# Patient Record
Sex: MALE | Race: WHITE | NOT HISPANIC OR LATINO | Employment: OTHER | URBAN - METROPOLITAN AREA
[De-identification: names, ages, dates, MRNs, and addresses within clinical notes are randomized per-mention and may not be internally consistent; named-entity substitution may affect disease eponyms.]

---

## 2017-02-21 ENCOUNTER — OFFICE VISIT (OUTPATIENT)
Dept: ORTHOPEDIC SURGERY | Facility: CLINIC | Age: 57
End: 2017-02-21

## 2017-02-21 VITALS — TEMPERATURE: 98.7 F | WEIGHT: 251 LBS | HEIGHT: 70 IN | BODY MASS INDEX: 35.93 KG/M2

## 2017-02-21 DIAGNOSIS — M48.061 SPINAL STENOSIS OF LUMBAR REGION: Primary | ICD-10-CM

## 2017-02-21 PROCEDURE — 99204 OFFICE O/P NEW MOD 45 MIN: CPT | Performed by: ORTHOPAEDIC SURGERY

## 2017-02-21 RX ORDER — NAPROXEN SODIUM 220 MG
220 TABLET ORAL 2 TIMES DAILY PRN
COMMUNITY
End: 2021-04-06 | Stop reason: HOSPADM

## 2017-02-21 RX ORDER — ACETAMINOPHEN 325 MG/1
650 TABLET ORAL AS NEEDED
COMMUNITY
End: 2017-09-15

## 2017-02-21 RX ORDER — HYDROCODONE BITARTRATE AND ACETAMINOPHEN 5; 325 MG/1; MG/1
1 TABLET ORAL EVERY 8 HOURS PRN
Qty: 40 TABLET | Refills: 0 | Status: SHIPPED | OUTPATIENT
Start: 2017-02-21 | End: 2017-03-21 | Stop reason: SDUPTHER

## 2017-02-21 NOTE — PROGRESS NOTES
New patient or new problem visit    Chief Complaint   Patient presents with   • Lumbar Spine - Pain       HPI: He complains of low back pain which radiates down the left lower extremity buttock thigh to the foot ongoing for 2-3 years.  Severe aching burning and constant some back pain mostly leg pain.    PFSH: See chart- reviewed    Review of Systems   Constitutional: Negative for chills, fever and unexpected weight change.   HENT: Positive for tinnitus. Negative for trouble swallowing and voice change.    Eyes: Negative for visual disturbance.   Respiratory: Negative for cough and shortness of breath.    Cardiovascular: Negative for chest pain and leg swelling.   Gastrointestinal: Negative for abdominal pain, nausea and vomiting.   Endocrine: Negative for cold intolerance and heat intolerance.   Genitourinary: Negative for difficulty urinating, frequency and urgency.   Musculoskeletal: Positive for joint swelling.   Skin: Negative for rash and wound.   Allergic/Immunologic: Negative for immunocompromised state.   Neurological: Positive for numbness (left leg). Negative for weakness.   Hematological: Does not bruise/bleed easily.   Psychiatric/Behavioral: Negative for dysphoric mood. The patient is not nervous/anxious.        PE: Constitutional: Vital signs above-noted.  Awake, alert and oriented    Psychiatric: Affect and insight do not appear grossly disturbed.    Pulmonary: Breathing is unlabored, color is good.    Skin: Warm, dry and normal turgor    Cardiac:  pedal pulses intact.  No edema.    Eyesight and hearing appear adequate for examination purposes      Musculoskeletal:  There is no tenderness to percussion and palpation of the spine. Motion appears undisturbed.  Posture is unremarkable to coronal and sagittal inspection.    The skin about the area is intact.  There is no palpable or visible deformity.  There is no local spasm.       Neurologic:   Reflexes are 2+ and symmetrical in the patellae and absent  in the Achilles.   Motor function is undisturbed in quadriceps, EHL, and gastrocnemius      Sensation appears symmetrically intact to light touch   .  In the bilateral lower extremities there is no evidence of atrophy.   Clonus is absent..  Gait appears undisturbed.  SLR test negative      MEDICAL DECISION MAKING    XRAY: MRI of the lumbar spine shows multilevel spondylosis and multilevel foraminal stenosis with mild canal compression changes.    Other: n/a    Impression: Lumbar disc degeneration lumbar spinal stenosis    Plan: Number epidural steroid injection.  I'll give him a few hydrocodone just for temporarily until the shots, for acute pain.

## 2017-03-15 ENCOUNTER — ANESTHESIA EVENT (OUTPATIENT)
Dept: PAIN MEDICINE | Facility: HOSPITAL | Age: 57
End: 2017-03-15

## 2017-03-15 ENCOUNTER — ANESTHESIA (OUTPATIENT)
Dept: PAIN MEDICINE | Facility: HOSPITAL | Age: 57
End: 2017-03-15

## 2017-03-15 ENCOUNTER — HOSPITAL ENCOUNTER (OUTPATIENT)
Dept: PAIN MEDICINE | Facility: HOSPITAL | Age: 57
Discharge: HOME | End: 2017-03-15
Attending: ORTHOPAEDIC SURGERY | Admitting: ORTHOPAEDIC SURGERY

## 2017-03-15 ENCOUNTER — TRANSCRIBE ORDERS (OUTPATIENT)
Dept: PAIN MEDICINE | Facility: HOSPITAL | Age: 57
End: 2017-03-15

## 2017-03-15 ENCOUNTER — HOSPITAL ENCOUNTER (OUTPATIENT)
Dept: GENERAL RADIOLOGY | Facility: HOSPITAL | Age: 57
Discharge: HOME | End: 2017-03-15

## 2017-03-15 VITALS
OXYGEN SATURATION: 94 % | WEIGHT: 250 LBS | HEART RATE: 73 BPM | HEIGHT: 72 IN | SYSTOLIC BLOOD PRESSURE: 126 MMHG | RESPIRATION RATE: 16 BRPM | BODY MASS INDEX: 33.86 KG/M2 | TEMPERATURE: 97.8 F | DIASTOLIC BLOOD PRESSURE: 83 MMHG

## 2017-03-15 DIAGNOSIS — M48.061 SPINAL STENOSIS OF LUMBAR REGION: Primary | ICD-10-CM

## 2017-03-15 DIAGNOSIS — M48.061 SPINAL STENOSIS OF LUMBAR REGION: ICD-10-CM

## 2017-03-15 DIAGNOSIS — R52 PAIN: ICD-10-CM

## 2017-03-15 PROCEDURE — 25010000002 METHYLPREDNISOLONE PER 80 MG: Performed by: ANESTHESIOLOGY

## 2017-03-15 PROCEDURE — 0 IOPAMIDOL 41 % SOLUTION: Performed by: ANESTHESIOLOGY

## 2017-03-15 PROCEDURE — 77003 FLUOROGUIDE FOR SPINE INJECT: CPT

## 2017-03-15 PROCEDURE — 25010000002 MIDAZOLAM PER 1 MG: Performed by: ANESTHESIOLOGY

## 2017-03-15 PROCEDURE — 25010000002 FENTANYL CITRATE (PF) 100 MCG/2ML SOLUTION: Performed by: ANESTHESIOLOGY

## 2017-03-15 PROCEDURE — C1755 CATHETER, INTRASPINAL: HCPCS

## 2017-03-15 RX ORDER — SODIUM CHLORIDE 0.9 % (FLUSH) 0.9 %
1-10 SYRINGE (ML) INJECTION AS NEEDED
Status: CANCELLED | OUTPATIENT
Start: 2017-03-15

## 2017-03-15 RX ORDER — BUPIVACAINE HYDROCHLORIDE 2.5 MG/ML
30 INJECTION, SOLUTION EPIDURAL; INFILTRATION; INTRACAUDAL ONCE
Status: DISCONTINUED | OUTPATIENT
Start: 2017-03-15 | End: 2017-03-16 | Stop reason: HOSPADM

## 2017-03-15 RX ORDER — LIDOCAINE HYDROCHLORIDE 10 MG/ML
1 INJECTION, SOLUTION INFILTRATION; PERINEURAL ONCE
Status: DISCONTINUED | OUTPATIENT
Start: 2017-03-15 | End: 2017-03-16 | Stop reason: HOSPADM

## 2017-03-15 RX ORDER — METHYLPREDNISOLONE ACETATE 80 MG/ML
80 INJECTION, SUSPENSION INTRA-ARTICULAR; INTRALESIONAL; INTRAMUSCULAR; SOFT TISSUE ONCE
Status: COMPLETED | OUTPATIENT
Start: 2017-03-15 | End: 2017-03-15

## 2017-03-15 RX ORDER — FENTANYL CITRATE 50 UG/ML
50 INJECTION, SOLUTION INTRAMUSCULAR; INTRAVENOUS
Status: DISCONTINUED | OUTPATIENT
Start: 2017-03-15 | End: 2017-03-16 | Stop reason: HOSPADM

## 2017-03-15 RX ORDER — SODIUM CHLORIDE 0.9 % (FLUSH) 0.9 %
1-10 SYRINGE (ML) INJECTION AS NEEDED
Status: DISCONTINUED | OUTPATIENT
Start: 2017-03-15 | End: 2017-03-16 | Stop reason: HOSPADM

## 2017-03-15 RX ORDER — MIDAZOLAM HYDROCHLORIDE 1 MG/ML
1 INJECTION INTRAMUSCULAR; INTRAVENOUS
Status: DISCONTINUED | OUTPATIENT
Start: 2017-03-15 | End: 2017-03-16 | Stop reason: HOSPADM

## 2017-03-15 RX ADMIN — IOPAMIDOL 10 ML: 408 INJECTION, SOLUTION INTRATHECAL at 10:07

## 2017-03-15 RX ADMIN — FENTANYL CITRATE 50 MCG: 50 INJECTION INTRAMUSCULAR; INTRAVENOUS at 10:02

## 2017-03-15 RX ADMIN — METHYLPREDNISOLONE ACETATE 80 MG: 80 INJECTION, SUSPENSION INTRA-ARTICULAR; INTRALESIONAL; INTRAMUSCULAR; SOFT TISSUE at 10:05

## 2017-03-15 RX ADMIN — MIDAZOLAM 2 MG: 1 INJECTION INTRAMUSCULAR; INTRAVENOUS at 10:02

## 2017-03-15 NOTE — H&P
[facility]    History and Physical    Patient Name: Harvinder Vega  :  1960  MRN:  [id number]  Date of Admission: 3/15/2017    Subjective     Patient is a 57 y.o. male presents with chief complaint of chronic, intermitent, excruciating low back and hips: left pain.  Onset of symptoms was gradual starting 2 years ago.  Symptoms are associated/aggravated by exercise, lifting, running, standing, straining or twisting. Symptoms improve with relaxation    The following portions of the patients history were reviewed and updated as appropriate: current medications, allergies, past medical history, past surgical history, past family history, past social history and problem list                Objective     Past Medical History: History reviewed. No pertinent past medical history.  Past Surgical History:   Past Surgical History   Procedure Laterality Date   • Rotator cuff repair Right    • Shoulder surgery Right    • Knee surgery Right      Family History:   Family History   Problem Relation Age of Onset   • Heart failure Mother    • Heart failure Father      Social History:   Social History   Substance Use Topics   • Smoking status: Current Every Day Smoker     Packs/day: 1.00     Years: 30.00   • Smokeless tobacco: None   • Alcohol use No       Vital Signs Range for the last 24 hours  Temperature:     Temp Source:     BP:     Pulse:     Respirations:     SPO2:     O2 Amount (l/min):     O2 Devices     Weight:           --------------------------------------------------------------------------------    Current Outpatient Prescriptions   Medication Sig Dispense Refill   • acetaminophen (TYLENOL) 325 MG tablet Take 650 mg by mouth As Needed for mild pain (1-3).     • HYDROcodone-acetaminophen (NORCO) 5-325 MG per tablet Take 1 tablet by mouth Every 8 (Eight) Hours As Needed for mild pain (1-3). 1-2 oral Q4H PRN severe pain 40 tablet 0   • naproxen sodium (ALEVE) 220 MG tablet Take 220 mg by mouth As Needed  for mild pain (1-3).     • gabapentin (NEURONTIN) 600 MG tablet Take 1 tablet by mouth 3 (three) times a day. As needed for back and leg pain. 90 tablet 1     Current Facility-Administered Medications   Medication Dose Route Frequency Provider Last Rate Last Dose   • bupivacaine PF (MARCAINE) 0.25 % injection 30 mL  30 mL Infiltration Once Phill Estrella MD       • FentaNYL Citrate (PF) (SUBLIMAZE) injection 50 mcg  50 mcg Intravenous Q5 Min PRN Phill Estrella MD       • iopamidol (ISOVUE-M 200) injection 41%  2 mL Injection Once in imaging Phill Estrella MD       • lidocaine (XYLOCAINE) 1 % injection 1 mL  1 mL Intradermal Once Phill Estrella MD       • methylPREDNISolone acetate (DEPO-medrol) injection 80 mg  80 mg Intramuscular Once Phill Estrella MD       • midazolam (VERSED) injection 1 mg  1 mg Intravenous Q5 Min PRN Phill Estrella MD       • sodium chloride 0.9 % flush 1-10 mL  1-10 mL Intravenous PRN Phill Estrella MD           --------------------------------------------------------------------------------  Assessment/Plan      Anesthesia Evaluation     Patient summary reviewed and Nursing notes reviewed   no history of anesthetic complications:  NPO Status: > 6 hours   Airway   Mallampati: II  TM distance: >3 FB  Neck ROM: full  Dental - normal exam     Pulmonary - normal exam   (+) a smoker Current,   Cardiovascular - negative cardio ROS and normal exam        Neuro/Psych- negative ROS and neuro exam normal  GI/Hepatic/Renal/Endo - negative ROS     Musculoskeletal (-) negative ROS    (-) straight leg test,  MELISSA test  Abdominal  - normal exam   Substance History - negative use     OB/GYN          Other                               Diagnosis and Plan    Treatment Plan  ASA 1      Procedures: Lumbar Epidural Steroid Injection(LESI), With fluoroscopy,       Anesthetic plan and risks discussed with patient.        Lumbar radiculopathy and Lumbar Disc  Degeneration

## 2017-03-15 NOTE — ANESTHESIA PROCEDURE NOTES
PAIN Epidural block    Patient location during procedure: pain clinic  Indication:procedure for pain  Performed By  Anesthesiologist: SUDHA TOMLINSON  Preanesthetic Checklist  Completed: patient identified, site marked, surgical consent, pre-op evaluation, timeout performed, risks and benefits discussed and monitors and equipment checked  Additional Notes  Depomedrol - 80mg    Needle position confirmed by fluoroscopy and epidurogram using 2cc of [id number].    Diagnosis  Post-Op Diagnosis Codes:     * Degeneration, intervertebral disc, lumbar (M51.36)     * Lumbar radiculopathy (M54.16)    Epidural Block Prep:  Pt Position:prone  Sterile Tech:cap, gloves, mask and sterile barrier  Prep:chlorhexidine gluconate and isopropyl alcohol  Monitoring:blood pressure monitoring, continuous pulse oximetry and EKG  Epidural Block Procedure:  Approach:left paramedian  Guidance: fluoroscopy  Location:lumbar  Level:5-6  Needle Type:Tuohy  Needle Gauge:20  Aspiration:negative  Medications:  Depomedrol:80 mg  Preservative Free Saline:2mL  Isovue:2mL    Post Assessment:  Post-procedure: bandaide.  Pt Tolerance:patient tolerated the procedure well with no apparent complications  Complications:no

## 2017-03-21 RX ORDER — HYDROCODONE BITARTRATE AND ACETAMINOPHEN 5; 325 MG/1; MG/1
1 TABLET ORAL EVERY 8 HOURS PRN
Qty: 40 TABLET | Refills: 0 | Status: SHIPPED | OUTPATIENT
Start: 2017-03-21 | End: 2017-05-11 | Stop reason: SDUPTHER

## 2017-04-03 ENCOUNTER — ANESTHESIA EVENT (OUTPATIENT)
Dept: PAIN MEDICINE | Facility: HOSPITAL | Age: 57
End: 2017-04-03

## 2017-04-03 ENCOUNTER — ANESTHESIA (OUTPATIENT)
Dept: PAIN MEDICINE | Facility: HOSPITAL | Age: 57
End: 2017-04-03

## 2017-04-03 ENCOUNTER — TRANSCRIBE ORDERS (OUTPATIENT)
Dept: PAIN MEDICINE | Facility: HOSPITAL | Age: 57
End: 2017-04-03

## 2017-04-03 ENCOUNTER — HOSPITAL ENCOUNTER (OUTPATIENT)
Dept: PAIN MEDICINE | Facility: HOSPITAL | Age: 57
Discharge: HOME | End: 2017-04-03
Admitting: ORTHOPAEDIC SURGERY

## 2017-04-03 ENCOUNTER — HOSPITAL ENCOUNTER (OUTPATIENT)
Dept: GENERAL RADIOLOGY | Facility: HOSPITAL | Age: 57
Discharge: HOME | End: 2017-04-03

## 2017-04-03 VITALS
TEMPERATURE: 98.2 F | HEIGHT: 72 IN | SYSTOLIC BLOOD PRESSURE: 124 MMHG | RESPIRATION RATE: 16 BRPM | HEART RATE: 80 BPM | BODY MASS INDEX: 33.86 KG/M2 | WEIGHT: 250 LBS | OXYGEN SATURATION: 97 % | DIASTOLIC BLOOD PRESSURE: 84 MMHG

## 2017-04-03 DIAGNOSIS — R52 PAIN: ICD-10-CM

## 2017-04-03 DIAGNOSIS — M48.061 SPINAL STENOSIS OF LUMBAR REGION: Primary | ICD-10-CM

## 2017-04-03 PROCEDURE — 0 IOPAMIDOL 41 % SOLUTION: Performed by: ANESTHESIOLOGY

## 2017-04-03 PROCEDURE — 25010000002 METHYLPREDNISOLONE PER 80 MG: Performed by: ANESTHESIOLOGY

## 2017-04-03 PROCEDURE — 25010000002 FENTANYL CITRATE (PF) 100 MCG/2ML SOLUTION: Performed by: ANESTHESIOLOGY

## 2017-04-03 PROCEDURE — 25010000002 MIDAZOLAM PER 1 MG: Performed by: ANESTHESIOLOGY

## 2017-04-03 PROCEDURE — 77003 FLUOROGUIDE FOR SPINE INJECT: CPT

## 2017-04-03 PROCEDURE — C1755 CATHETER, INTRASPINAL: HCPCS

## 2017-04-03 RX ORDER — LIDOCAINE HYDROCHLORIDE 10 MG/ML
0.5 INJECTION, SOLUTION INFILTRATION; PERINEURAL ONCE AS NEEDED
Status: CANCELLED | OUTPATIENT
Start: 2017-04-03

## 2017-04-03 RX ORDER — LIDOCAINE HYDROCHLORIDE 10 MG/ML
1 INJECTION, SOLUTION INFILTRATION; PERINEURAL ONCE
Status: DISCONTINUED | OUTPATIENT
Start: 2017-04-03 | End: 2017-04-04 | Stop reason: HOSPADM

## 2017-04-03 RX ORDER — FENTANYL CITRATE 50 UG/ML
50 INJECTION, SOLUTION INTRAMUSCULAR; INTRAVENOUS
Status: DISCONTINUED | OUTPATIENT
Start: 2017-04-03 | End: 2017-04-04 | Stop reason: HOSPADM

## 2017-04-03 RX ORDER — SODIUM CHLORIDE 0.9 % (FLUSH) 0.9 %
1-10 SYRINGE (ML) INJECTION AS NEEDED
Status: DISCONTINUED | OUTPATIENT
Start: 2017-04-03 | End: 2017-04-04 | Stop reason: HOSPADM

## 2017-04-03 RX ORDER — BUPIVACAINE HYDROCHLORIDE 2.5 MG/ML
30 INJECTION, SOLUTION EPIDURAL; INFILTRATION; INTRACAUDAL ONCE
Status: DISCONTINUED | OUTPATIENT
Start: 2017-04-03 | End: 2017-04-04 | Stop reason: HOSPADM

## 2017-04-03 RX ORDER — MIDAZOLAM HYDROCHLORIDE 1 MG/ML
1 INJECTION INTRAMUSCULAR; INTRAVENOUS
Status: DISCONTINUED | OUTPATIENT
Start: 2017-04-03 | End: 2017-04-04 | Stop reason: HOSPADM

## 2017-04-03 RX ORDER — METHYLPREDNISOLONE ACETATE 80 MG/ML
80 INJECTION, SUSPENSION INTRA-ARTICULAR; INTRALESIONAL; INTRAMUSCULAR; SOFT TISSUE ONCE
Status: COMPLETED | OUTPATIENT
Start: 2017-04-03 | End: 2017-04-03

## 2017-04-03 RX ORDER — SODIUM CHLORIDE 0.9 % (FLUSH) 0.9 %
1-10 SYRINGE (ML) INJECTION AS NEEDED
Status: CANCELLED | OUTPATIENT
Start: 2017-04-03

## 2017-04-03 RX ADMIN — METHYLPREDNISOLONE ACETATE 80 MG: 80 INJECTION, SUSPENSION INTRA-ARTICULAR; INTRALESIONAL; INTRAMUSCULAR; SOFT TISSUE at 09:46

## 2017-04-03 RX ADMIN — FENTANYL CITRATE 50 MCG: 50 INJECTION INTRAMUSCULAR; INTRAVENOUS at 09:42

## 2017-04-03 RX ADMIN — IOPAMIDOL 10 ML: 408 INJECTION, SOLUTION INTRATHECAL at 09:45

## 2017-04-03 RX ADMIN — MIDAZOLAM 2 MG: 1 INJECTION INTRAMUSCULAR; INTRAVENOUS at 09:42

## 2017-04-03 NOTE — ANESTHESIA PROCEDURE NOTES
PAIN Epidural block    Patient location during procedure: pain clinic  Indication:procedure for pain  Performed By  Anesthesiologist: SUDHA TOMLINSON  Preanesthetic Checklist  Completed: patient identified, site marked, surgical consent, pre-op evaluation, timeout performed, risks and benefits discussed and monitors and equipment checked  Additional Notes  Depomedrol - 80mg    Needle position confirmed by fluoroscopy and epidurogram using 2cc of [id number].    Diagnosis  Post-Op Diagnosis Codes:     * Lumbar radiculopathy (M54.16)     * Lumbar degenerative disc disease (M51.36)    Epidural Block Prep:  Pt Position:prone  Sterile Tech:cap, gloves, mask and sterile barrier  Prep:chlorhexidine gluconate and isopropyl alcohol  Monitoring:blood pressure monitoring, continuous pulse oximetry and EKG  Epidural Block Procedure:  Approach:left paramedian  Guidance: fluoroscopy  Location:lumbar  Level:5-6  Needle Type:Tuohy  Needle Gauge:20  Aspiration:negative  Medications:  Depomedrol:80 mg  Preservative Free Saline:2mL  Isovue:2mL    Post Assessment:  Post-procedure: bandaide.  Pt Tolerance:patient tolerated the procedure well with no apparent complications  Complications:no

## 2017-04-03 NOTE — H&P (VIEW-ONLY)
[facility]    History and Physical    Patient Name: Harvinder Vega  :  1960  MRN:  [id number]  Date of Admission: 3/15/2017    Subjective     Patient is a 57 y.o. male presents with chief complaint of chronic, intermitent, excruciating low back and hips: left pain.  Onset of symptoms was gradual starting 2 years ago.  Symptoms are associated/aggravated by exercise, lifting, running, standing, straining or twisting. Symptoms improve with relaxation    The following portions of the patients history were reviewed and updated as appropriate: current medications, allergies, past medical history, past surgical history, past family history, past social history and problem list                Objective     Past Medical History: History reviewed. No pertinent past medical history.  Past Surgical History:   Past Surgical History   Procedure Laterality Date   • Rotator cuff repair Right    • Shoulder surgery Right    • Knee surgery Right      Family History:   Family History   Problem Relation Age of Onset   • Heart failure Mother    • Heart failure Father      Social History:   Social History   Substance Use Topics   • Smoking status: Current Every Day Smoker     Packs/day: 1.00     Years: 30.00   • Smokeless tobacco: None   • Alcohol use No       Vital Signs Range for the last 24 hours  Temperature:     Temp Source:     BP:     Pulse:     Respirations:     SPO2:     O2 Amount (l/min):     O2 Devices     Weight:           --------------------------------------------------------------------------------    Current Outpatient Prescriptions   Medication Sig Dispense Refill   • acetaminophen (TYLENOL) 325 MG tablet Take 650 mg by mouth As Needed for mild pain (1-3).     • HYDROcodone-acetaminophen (NORCO) 5-325 MG per tablet Take 1 tablet by mouth Every 8 (Eight) Hours As Needed for mild pain (1-3). 1-2 oral Q4H PRN severe pain 40 tablet 0   • naproxen sodium (ALEVE) 220 MG tablet Take 220 mg by mouth As Needed  for mild pain (1-3).     • gabapentin (NEURONTIN) 600 MG tablet Take 1 tablet by mouth 3 (three) times a day. As needed for back and leg pain. 90 tablet 1     Current Facility-Administered Medications   Medication Dose Route Frequency Provider Last Rate Last Dose   • bupivacaine PF (MARCAINE) 0.25 % injection 30 mL  30 mL Infiltration Once Phill Estrella MD       • FentaNYL Citrate (PF) (SUBLIMAZE) injection 50 mcg  50 mcg Intravenous Q5 Min PRN Phill Estrella MD       • iopamidol (ISOVUE-M 200) injection 41%  2 mL Injection Once in imaging Phill Estrella MD       • lidocaine (XYLOCAINE) 1 % injection 1 mL  1 mL Intradermal Once Phill Estrella MD       • methylPREDNISolone acetate (DEPO-medrol) injection 80 mg  80 mg Intramuscular Once Phill Estrella MD       • midazolam (VERSED) injection 1 mg  1 mg Intravenous Q5 Min PRN Phill Estrella MD       • sodium chloride 0.9 % flush 1-10 mL  1-10 mL Intravenous PRN Phill Estrella MD           --------------------------------------------------------------------------------  Assessment/Plan      Anesthesia Evaluation     Patient summary reviewed and Nursing notes reviewed   no history of anesthetic complications:  NPO Status: > 6 hours   Airway   Mallampati: II  TM distance: >3 FB  Neck ROM: full  Dental - normal exam     Pulmonary - normal exam   (+) a smoker Current,   Cardiovascular - negative cardio ROS and normal exam        Neuro/Psych- negative ROS and neuro exam normal  GI/Hepatic/Renal/Endo - negative ROS     Musculoskeletal (-) negative ROS    (-) straight leg test,  MELISSA test  Abdominal  - normal exam   Substance History - negative use     OB/GYN          Other                               Diagnosis and Plan    Treatment Plan  ASA 1      Procedures: Lumbar Epidural Steroid Injection(LESI), With fluoroscopy,       Anesthetic plan and risks discussed with patient.        Lumbar radiculopathy and Lumbar Disc  Degeneration

## 2017-04-03 NOTE — INTERVAL H&P NOTE
[facility]  H&P reviewed. No changes since last visit.  Patient states   25-50% improvement since the last procedure/injection.    Diagnosis     * Lumbar radiculopathy [M54.16]     * Lumbar degenerative disc disease [M51.36]      Airway assessed since last visit. Airway class equals: 2.

## 2017-05-08 ENCOUNTER — ANESTHESIA (OUTPATIENT)
Dept: PAIN MEDICINE | Facility: HOSPITAL | Age: 57
End: 2017-05-08

## 2017-05-08 ENCOUNTER — ANESTHESIA EVENT (OUTPATIENT)
Dept: PAIN MEDICINE | Facility: HOSPITAL | Age: 57
End: 2017-05-08

## 2017-05-08 ENCOUNTER — HOSPITAL ENCOUNTER (OUTPATIENT)
Dept: GENERAL RADIOLOGY | Facility: HOSPITAL | Age: 57
Discharge: HOME | End: 2017-05-08

## 2017-05-08 ENCOUNTER — HOSPITAL ENCOUNTER (OUTPATIENT)
Dept: PAIN MEDICINE | Facility: HOSPITAL | Age: 57
Discharge: HOME | End: 2017-05-08
Admitting: ORTHOPAEDIC SURGERY

## 2017-05-08 VITALS
DIASTOLIC BLOOD PRESSURE: 102 MMHG | WEIGHT: 250 LBS | RESPIRATION RATE: 16 BRPM | SYSTOLIC BLOOD PRESSURE: 125 MMHG | TEMPERATURE: 97.9 F | OXYGEN SATURATION: 97 % | HEIGHT: 72 IN | HEART RATE: 63 BPM | BODY MASS INDEX: 33.86 KG/M2

## 2017-05-08 DIAGNOSIS — M48.061 SPINAL STENOSIS OF LUMBAR REGION: ICD-10-CM

## 2017-05-08 DIAGNOSIS — R52 PAIN: ICD-10-CM

## 2017-05-08 PROCEDURE — 77003 FLUOROGUIDE FOR SPINE INJECT: CPT

## 2017-05-08 PROCEDURE — 25010000002 FENTANYL CITRATE (PF) 100 MCG/2ML SOLUTION: Performed by: ANESTHESIOLOGY

## 2017-05-08 PROCEDURE — 25010000002 MIDAZOLAM PER 1 MG: Performed by: ANESTHESIOLOGY

## 2017-05-08 PROCEDURE — 25010000002 METHYLPREDNISOLONE PER 80 MG: Performed by: ANESTHESIOLOGY

## 2017-05-08 PROCEDURE — C1755 CATHETER, INTRASPINAL: HCPCS

## 2017-05-08 RX ORDER — METHYLPREDNISOLONE ACETATE 80 MG/ML
80 INJECTION, SUSPENSION INTRA-ARTICULAR; INTRALESIONAL; INTRAMUSCULAR; SOFT TISSUE ONCE
Status: COMPLETED | OUTPATIENT
Start: 2017-05-08 | End: 2017-05-08

## 2017-05-08 RX ORDER — LIDOCAINE HYDROCHLORIDE 10 MG/ML
1 INJECTION, SOLUTION INFILTRATION; PERINEURAL ONCE
Status: DISCONTINUED | OUTPATIENT
Start: 2017-05-08 | End: 2017-05-09 | Stop reason: HOSPADM

## 2017-05-08 RX ORDER — FENTANYL CITRATE 50 UG/ML
50 INJECTION, SOLUTION INTRAMUSCULAR; INTRAVENOUS
Status: DISCONTINUED | OUTPATIENT
Start: 2017-05-08 | End: 2017-05-09 | Stop reason: HOSPADM

## 2017-05-08 RX ORDER — SODIUM CHLORIDE 0.9 % (FLUSH) 0.9 %
1-10 SYRINGE (ML) INJECTION AS NEEDED
Status: DISCONTINUED | OUTPATIENT
Start: 2017-05-08 | End: 2017-05-09 | Stop reason: HOSPADM

## 2017-05-08 RX ORDER — MIDAZOLAM HYDROCHLORIDE 1 MG/ML
1 INJECTION INTRAMUSCULAR; INTRAVENOUS
Status: DISCONTINUED | OUTPATIENT
Start: 2017-05-08 | End: 2017-05-09 | Stop reason: HOSPADM

## 2017-05-08 RX ADMIN — MIDAZOLAM 2 MG: 1 INJECTION INTRAMUSCULAR; INTRAVENOUS at 11:09

## 2017-05-08 RX ADMIN — FENTANYL CITRATE 50 MCG: 50 INJECTION INTRAMUSCULAR; INTRAVENOUS at 11:10

## 2017-05-08 RX ADMIN — METHYLPREDNISOLONE ACETATE 80 MG: 80 INJECTION, SUSPENSION INTRA-ARTICULAR; INTRALESIONAL; INTRAMUSCULAR; SOFT TISSUE at 11:11

## 2017-05-11 RX ORDER — HYDROCODONE BITARTRATE AND ACETAMINOPHEN 5; 325 MG/1; MG/1
1 TABLET ORAL EVERY 8 HOURS PRN
Qty: 40 TABLET | Refills: 0 | Status: SHIPPED | OUTPATIENT
Start: 2017-05-11 | End: 2017-06-13 | Stop reason: SDUPTHER

## 2017-06-13 RX ORDER — HYDROCODONE BITARTRATE AND ACETAMINOPHEN 5; 325 MG/1; MG/1
1 TABLET ORAL EVERY 8 HOURS PRN
Qty: 40 TABLET | Refills: 0 | Status: SHIPPED | OUTPATIENT
Start: 2017-06-13 | End: 2017-08-08 | Stop reason: SDUPTHER

## 2017-06-13 NOTE — TELEPHONE ENCOUNTER
LEFT DETAILED MESSAGE FOR PT ON [facility]  PER [facility] THIS WILL  BE THE LAST NARCOTIC RX. IF HIS IS HAVING CHRONIC PAIN WE CAN PUT IN A REFERRAL FOR PAIN MANAGEMENT MG

## 2017-08-08 ENCOUNTER — OFFICE VISIT (OUTPATIENT)
Dept: ORTHOPEDIC SURGERY | Facility: CLINIC | Age: 57
End: 2017-08-08

## 2017-08-08 VITALS — BODY MASS INDEX: 45.16 KG/M2 | HEIGHT: 60 IN | WEIGHT: 230 LBS

## 2017-08-08 DIAGNOSIS — M48.061 LUMBAR SPINAL STENOSIS: Primary | ICD-10-CM

## 2017-08-08 DIAGNOSIS — M48.061 SPINAL STENOSIS OF LUMBAR REGION: Primary | ICD-10-CM

## 2017-08-08 PROCEDURE — 99214 OFFICE O/P EST MOD 30 MIN: CPT | Performed by: ORTHOPAEDIC SURGERY

## 2017-08-08 RX ORDER — CEFAZOLIN SODIUM 2 G/100ML
2 INJECTION, SOLUTION INTRAVENOUS ONCE
Status: CANCELLED | OUTPATIENT
Start: 2017-08-31 | End: 2017-08-08

## 2017-08-08 RX ORDER — SODIUM CHLORIDE 0.9 % (FLUSH) 0.9 %
1-10 SYRINGE (ML) INJECTION AS NEEDED
Status: CANCELLED | OUTPATIENT
Start: 2017-08-31

## 2017-08-08 RX ORDER — HYDROCODONE BITARTRATE AND ACETAMINOPHEN 5; 325 MG/1; MG/1
1 TABLET ORAL EVERY 8 HOURS PRN
Qty: 40 TABLET | Refills: 0 | Status: SHIPPED | OUTPATIENT
Start: 2017-08-08 | End: 2017-09-01 | Stop reason: HOSPADM

## 2017-08-08 RX ORDER — SODIUM CHLORIDE, SODIUM LACTATE, POTASSIUM CHLORIDE, CALCIUM CHLORIDE 600; 310; 30; 20 MG/100ML; MG/100ML; MG/100ML; MG/100ML
100 INJECTION, SOLUTION INTRAVENOUS CONTINUOUS
Status: CANCELLED | OUTPATIENT
Start: 2017-08-31

## 2017-08-08 NOTE — PROGRESS NOTES
She complains of continued left hip and left lower extremity pain which is failed to improve with conservative care including epidural injections.  He has significant foraminal and recess stenosis bilaterally but his symptoms are all left side I reviewed his MRI scan I'm recommending a left L4 5, L5-S1 laminectomy medial facetectomy and foraminotomy basically we want to uncover the L5 nerve root from shoulder to ganglion on the left.  I discussed the risks and benefits of laminectomy.  Risks include adverse anesthetic events including death, stroke and myocardial infarction.  More specific surgical complications include infection, nerve root injury and/or paralysis, persistent pain, recurrent pain, spinal fluid leakage, painful scarring, and increased back pain and/or instability, among others. There is a 70 to 90 percent chance of success.   Alternatives were discussed.  After careful consideration the patient wishes to proceed with surgery.  25 minutes was spent in face-to-face consultation.  Given him some hydrocodone to take while he stops the Aleve.

## 2017-08-09 PROBLEM — M48.061 SPINAL STENOSIS OF LUMBAR REGION: Status: ACTIVE | Noted: 2017-08-09

## 2017-08-17 ENCOUNTER — APPOINTMENT (OUTPATIENT)
Dept: PREADMISSION TESTING | Facility: HOSPITAL | Age: 57
End: 2017-08-17

## 2017-08-17 VITALS
HEIGHT: 72 IN | RESPIRATION RATE: 20 BRPM | OXYGEN SATURATION: 98 % | DIASTOLIC BLOOD PRESSURE: 74 MMHG | BODY MASS INDEX: 31.56 KG/M2 | WEIGHT: 233 LBS | TEMPERATURE: 97.8 F | SYSTOLIC BLOOD PRESSURE: 115 MMHG | HEART RATE: 62 BPM

## 2017-08-17 LAB
DEPRECATED RDW RBC AUTO: 46.2 FL (ref 37–54)
ERYTHROCYTE [DISTWIDTH] IN BLOOD BY AUTOMATED COUNT: 13.2 % (ref 11.5–14.5)
HCT VFR BLD AUTO: 43.7 % (ref 40.4–52.2)
HGB BLD-MCNC: 14.1 G/DL (ref 13.7–17.6)
MCH RBC QN AUTO: 31 PG (ref 27–32.7)
MCHC RBC AUTO-ENTMCNC: 32.3 G/DL (ref 32.6–36.4)
MCV RBC AUTO: 96 FL (ref 79.8–96.2)
PLATELET # BLD AUTO: 245 10*3/MM3 (ref 140–500)
PMV BLD AUTO: 11.6 FL (ref 6–12)
RBC # BLD AUTO: 4.55 10*6/MM3 (ref 4.6–6)
WBC NRBC COR # BLD: 10.82 10*3/MM3 (ref 4.5–10.7)

## 2017-08-17 PROCEDURE — 85027 COMPLETE CBC AUTOMATED: CPT | Performed by: ORTHOPAEDIC SURGERY

## 2017-08-17 PROCEDURE — 36415 COLL VENOUS BLD VENIPUNCTURE: CPT

## 2017-08-17 NOTE — DISCHARGE INSTRUCTIONS
Take the following medications the morning of surgery with a small sip of water:    NONE    ARRIVE TO MAIN OR AT 5:30 A.M.      General Instructions:  • Do not eat solid food after midnight the night before surgery.  • You may drink clear liquids day of surgery but must stop at least one hour before your hospital arrival time.  • It is beneficial for you to have a clear drink that contains carbohydrates the day of surgery.  We suggest a 20 ounce bottle of Gatorade or Powerade for non-diabetic patients or a 20 ounce bottle of G2 or Powerade Zero for diabetic patients. (Pediatric patients, are not advised to drink a 20 ounce carbohydrate drink)    Clear liquids are liquids you can see through.  Nothing red in color.     Plain water                               Sports drinks  Sodas                                   Gelatin (Jell-O)  Fruit juices without pulp such as white grape juice and apple juice  Popsicles that contain no fruit or yogurt  Tea or coffee (no cream or milk added)  Gatorade / Powerade  G2 / Powerade Zero    • Infants may have breast milk up to four hours before surgery.  • Infants drinking formula may drink formula up to six hours before surgery.   • Patients who avoid smoking, chewing tobacco and alcohol for 4 weeks prior to surgery have a reduced risk of post-operative complications.  Quit smoking as many days before surgery as you can.  • Do not smoke, use chewing tobacco or drink alcohol the day of surgery.   • If applicable bring your C-PAP/ BI-PAP machine.  • Bring any papers given to you in the doctor’s office.  • Wear clean comfortable clothes and socks.  • Do not wear contact lenses or make-up.  Bring a case for your glasses.   • Bring crutches or walker if applicable.  • Leave all other valuables and jewelry at home.  • The Pre-Admission Testing nurse will instruct you to bring medications if unable to obtain an accurate list in Pre-Admission Testing.        If you were given a blood bank ID  arm band remember to bring it with you the day of surgery.    Preventing a Surgical Site Infection:  • For 2 to 3 days before surgery, avoid shaving with a razor because the razor can irritate skin and make it easier to develop an infection.  • The night prior to surgery sleep in a clean bed with clean clothing.  Do not allow pets to sleep with you.  • Shower on the morning of surgery using a fresh bar of anti-bacterial soap (such as Dial) and clean washcloth.  Dry with a clean towel and dress in clean clothing.  • Ask your surgeon if you will be receiving antibiotics prior to surgery.  • Make sure you, your family, and all healthcare providers clean their hands with soap and water or an alcohol based hand  before caring for you or your wound.    Day of surgery:  Upon arrival, a Pre-op nurse and Anesthesiologist will review your health history, obtain vital signs, and answer questions you may have.  The only belongings needed at this time will be your home medications and if applicable your C-PAP/BI-PAP machine.  If you are staying overnight your family can leave the rest of your belongings in the car and bring them to your room later.  A Pre-op nurse will start an IV and you may receive medication in preparation for surgery, including something to help you relax.  Your family will be able to see you in the Pre-op area.  While you are in surgery your family should notify the waiting room  if they leave the waiting room area and provide a contact phone number.    Please be aware that surgery does come with discomfort.  We want to make every effort to control your discomfort so please discuss any uncontrolled symptoms with your nurse.   Your doctor will most likely have prescribed pain medications.      If you are going home after surgery you will receive individualized written care instructions before being discharged.  A responsible adult must drive you to and from the hospital on the day of  your surgery and stay with you for 24 hours.    If you are staying overnight following surgery, you will be transported to your hospital room following the recovery period.  [facility] has all private rooms.    If you have any questions please call Pre-Admission Testing at [phone].  Deductibles and co-payments are collected on the day of service. Please be prepared to pay the required co-pay, deductible or deposit on the day of service as defined by your plan.

## 2017-08-31 ENCOUNTER — ANESTHESIA EVENT (OUTPATIENT)
Dept: PERIOP | Facility: HOSPITAL | Age: 57
End: 2017-08-31

## 2017-08-31 ENCOUNTER — ANESTHESIA (OUTPATIENT)
Dept: PERIOP | Facility: HOSPITAL | Age: 57
End: 2017-08-31

## 2017-08-31 ENCOUNTER — APPOINTMENT (OUTPATIENT)
Dept: GENERAL RADIOLOGY | Facility: HOSPITAL | Age: 57
End: 2017-08-31

## 2017-08-31 ENCOUNTER — HOSPITAL ENCOUNTER (INPATIENT)
Facility: HOSPITAL | Age: 57
LOS: 1 days | Discharge: HOME | End: 2017-09-01
Attending: ORTHOPAEDIC SURGERY | Admitting: ORTHOPAEDIC SURGERY

## 2017-08-31 DIAGNOSIS — M48.061 SPINAL STENOSIS OF LUMBAR REGION: ICD-10-CM

## 2017-08-31 LAB
ABO GROUP BLD: NORMAL
BLD GP AB SCN SERPL QL: NEGATIVE
RH BLD: POSITIVE

## 2017-08-31 PROCEDURE — 86850 RBC ANTIBODY SCREEN: CPT | Performed by: ORTHOPAEDIC SURGERY

## 2017-08-31 PROCEDURE — S0260 H&P FOR SURGERY: HCPCS | Performed by: ORTHOPAEDIC SURGERY

## 2017-08-31 PROCEDURE — 25010000002 HYDRALAZINE PER 20 MG: Performed by: ANESTHESIOLOGY

## 2017-08-31 PROCEDURE — 01NB0ZZ RELEASE LUMBAR NERVE, OPEN APPROACH: ICD-10-PCS | Performed by: ORTHOPAEDIC SURGERY

## 2017-08-31 PROCEDURE — 25010000002 SUCCINYLCHOLINE PER 20 MG: Performed by: NURSE ANESTHETIST, CERTIFIED REGISTERED

## 2017-08-31 PROCEDURE — 25010000002 PROPOFOL 10 MG/ML EMULSION: Performed by: NURSE ANESTHETIST, CERTIFIED REGISTERED

## 2017-08-31 PROCEDURE — 25810000003 POTASSIUM CHLORIDE PER 2 MEQ: Performed by: ORTHOPAEDIC SURGERY

## 2017-08-31 PROCEDURE — 63047 LAM FACETEC & FORAMOT LUMBAR: CPT | Performed by: ORTHOPAEDIC SURGERY

## 2017-08-31 PROCEDURE — 25010000002 HYDROMORPHONE PER 4 MG: Performed by: ANESTHESIOLOGY

## 2017-08-31 PROCEDURE — 25010000002 METHYLPREDNISOLONE PER 40 MG: Performed by: ORTHOPAEDIC SURGERY

## 2017-08-31 PROCEDURE — 25010000002 PHENYLEPHRINE PER 1 ML: Performed by: NURSE ANESTHETIST, CERTIFIED REGISTERED

## 2017-08-31 PROCEDURE — 63048 LAM FACETEC &FORAMOT EA ADDL: CPT | Performed by: ORTHOPAEDIC SURGERY

## 2017-08-31 PROCEDURE — 25010000002 DEXAMETHASONE PER 1 MG: Performed by: NURSE ANESTHETIST, CERTIFIED REGISTERED

## 2017-08-31 PROCEDURE — 25010000003 CEFAZOLIN IN DEXTROSE 2-4 GM/100ML-% SOLUTION: Performed by: ORTHOPAEDIC SURGERY

## 2017-08-31 PROCEDURE — 72100 X-RAY EXAM L-S SPINE 2/3 VWS: CPT

## 2017-08-31 PROCEDURE — 86901 BLOOD TYPING SEROLOGIC RH(D): CPT | Performed by: ORTHOPAEDIC SURGERY

## 2017-08-31 PROCEDURE — 25010000002 FENTANYL CITRATE (PF) 100 MCG/2ML SOLUTION: Performed by: NURSE ANESTHETIST, CERTIFIED REGISTERED

## 2017-08-31 PROCEDURE — 86900 BLOOD TYPING SEROLOGIC ABO: CPT | Performed by: ORTHOPAEDIC SURGERY

## 2017-08-31 PROCEDURE — 25010000002 ONDANSETRON PER 1 MG: Performed by: NURSE ANESTHETIST, CERTIFIED REGISTERED

## 2017-08-31 PROCEDURE — 25010000002 FENTANYL CITRATE (PF) 100 MCG/2ML SOLUTION: Performed by: ANESTHESIOLOGY

## 2017-08-31 PROCEDURE — 25010000002 DIPHENHYDRAMINE PER 50 MG: Performed by: ANESTHESIOLOGY

## 2017-08-31 PROCEDURE — 25010000002 MIDAZOLAM PER 1 MG: Performed by: ANESTHESIOLOGY

## 2017-08-31 PROCEDURE — 76000 FLUOROSCOPY <1 HR PHYS/QHP: CPT

## 2017-08-31 PROCEDURE — 25010000002 HYDROMORPHONE PER 4 MG: Performed by: NURSE ANESTHETIST, CERTIFIED REGISTERED

## 2017-08-31 RX ORDER — HYDROMORPHONE HYDROCHLORIDE 1 MG/ML
0.25 INJECTION, SOLUTION INTRAMUSCULAR; INTRAVENOUS; SUBCUTANEOUS
Status: DISCONTINUED | OUTPATIENT
Start: 2017-08-31 | End: 2017-08-31 | Stop reason: HOSPADM

## 2017-08-31 RX ORDER — POLYETHYLENE GLYCOL 3350 17 G/17G
17 POWDER, FOR SOLUTION ORAL DAILY PRN
Status: DISCONTINUED | OUTPATIENT
Start: 2017-08-31 | End: 2017-09-01 | Stop reason: HOSPADM

## 2017-08-31 RX ORDER — HYDRALAZINE HYDROCHLORIDE 20 MG/ML
5 INJECTION INTRAMUSCULAR; INTRAVENOUS
Status: DISCONTINUED | OUTPATIENT
Start: 2017-08-31 | End: 2017-08-31 | Stop reason: HOSPADM

## 2017-08-31 RX ORDER — PROMETHAZINE HYDROCHLORIDE 25 MG/1
25 SUPPOSITORY RECTAL ONCE AS NEEDED
Status: DISCONTINUED | OUTPATIENT
Start: 2017-08-31 | End: 2017-08-31 | Stop reason: HOSPADM

## 2017-08-31 RX ORDER — SODIUM CHLORIDE 0.9 % (FLUSH) 0.9 %
1-10 SYRINGE (ML) INJECTION AS NEEDED
Status: DISCONTINUED | OUTPATIENT
Start: 2017-08-31 | End: 2017-09-01 | Stop reason: HOSPADM

## 2017-08-31 RX ORDER — LIDOCAINE HYDROCHLORIDE 40 MG/ML
SOLUTION TOPICAL AS NEEDED
Status: DISCONTINUED | OUTPATIENT
Start: 2017-08-31 | End: 2017-08-31 | Stop reason: SURG

## 2017-08-31 RX ORDER — ACETAMINOPHEN 325 MG/1
650 TABLET ORAL EVERY 4 HOURS PRN
Status: DISCONTINUED | OUTPATIENT
Start: 2017-08-31 | End: 2017-09-01 | Stop reason: HOSPADM

## 2017-08-31 RX ORDER — OXYCODONE HYDROCHLORIDE AND ACETAMINOPHEN 5; 325 MG/1; MG/1
1 TABLET ORAL ONCE AS NEEDED
Status: DISCONTINUED | OUTPATIENT
Start: 2017-08-31 | End: 2017-08-31 | Stop reason: HOSPADM

## 2017-08-31 RX ORDER — HYDROMORPHONE HCL IN 0.9% NACL 10 MG/50ML
PATIENT CONTROLLED ANALGESIA SYRINGE INTRAVENOUS
Status: COMPLETED
Start: 2017-08-31 | End: 2017-08-31

## 2017-08-31 RX ORDER — ONDANSETRON 4 MG/1
4 TABLET, ORALLY DISINTEGRATING ORAL EVERY 6 HOURS PRN
Status: DISCONTINUED | OUTPATIENT
Start: 2017-08-31 | End: 2017-09-01 | Stop reason: HOSPADM

## 2017-08-31 RX ORDER — FENTANYL CITRATE 50 UG/ML
25 INJECTION, SOLUTION INTRAMUSCULAR; INTRAVENOUS
Status: DISCONTINUED | OUTPATIENT
Start: 2017-08-31 | End: 2017-08-31 | Stop reason: HOSPADM

## 2017-08-31 RX ORDER — EPHEDRINE SULFATE 50 MG/ML
INJECTION, SOLUTION INTRAVENOUS AS NEEDED
Status: DISCONTINUED | OUTPATIENT
Start: 2017-08-31 | End: 2017-08-31 | Stop reason: SURG

## 2017-08-31 RX ORDER — DEXAMETHASONE SODIUM PHOSPHATE 10 MG/ML
INJECTION INTRAMUSCULAR; INTRAVENOUS AS NEEDED
Status: DISCONTINUED | OUTPATIENT
Start: 2017-08-31 | End: 2017-08-31 | Stop reason: SURG

## 2017-08-31 RX ORDER — SODIUM CHLORIDE 0.9 % (FLUSH) 0.9 %
1-10 SYRINGE (ML) INJECTION AS NEEDED
Status: DISCONTINUED | OUTPATIENT
Start: 2017-08-31 | End: 2017-08-31 | Stop reason: HOSPADM

## 2017-08-31 RX ORDER — FENTANYL CITRATE 50 UG/ML
INJECTION, SOLUTION INTRAMUSCULAR; INTRAVENOUS AS NEEDED
Status: DISCONTINUED | OUTPATIENT
Start: 2017-08-31 | End: 2017-08-31 | Stop reason: SURG

## 2017-08-31 RX ORDER — LABETALOL HYDROCHLORIDE 5 MG/ML
5 INJECTION, SOLUTION INTRAVENOUS
Status: DISCONTINUED | OUTPATIENT
Start: 2017-08-31 | End: 2017-08-31 | Stop reason: HOSPADM

## 2017-08-31 RX ORDER — SUCCINYLCHOLINE CHLORIDE 20 MG/ML
INJECTION INTRAMUSCULAR; INTRAVENOUS AS NEEDED
Status: DISCONTINUED | OUTPATIENT
Start: 2017-08-31 | End: 2017-08-31 | Stop reason: SURG

## 2017-08-31 RX ORDER — BISACODYL 10 MG
10 SUPPOSITORY, RECTAL RECTAL DAILY PRN
Status: DISCONTINUED | OUTPATIENT
Start: 2017-08-31 | End: 2017-09-01 | Stop reason: HOSPADM

## 2017-08-31 RX ORDER — SODIUM CHLORIDE, SODIUM LACTATE, POTASSIUM CHLORIDE, CALCIUM CHLORIDE 600; 310; 30; 20 MG/100ML; MG/100ML; MG/100ML; MG/100ML
100 INJECTION, SOLUTION INTRAVENOUS CONTINUOUS
Status: DISCONTINUED | OUTPATIENT
Start: 2017-08-31 | End: 2017-09-01 | Stop reason: HOSPADM

## 2017-08-31 RX ORDER — MIDAZOLAM HYDROCHLORIDE 1 MG/ML
2 INJECTION INTRAMUSCULAR; INTRAVENOUS
Status: DISCONTINUED | OUTPATIENT
Start: 2017-08-31 | End: 2017-08-31 | Stop reason: HOSPADM

## 2017-08-31 RX ORDER — ACETAMINOPHEN 500 MG
1000 TABLET ORAL ONCE
Status: COMPLETED | OUTPATIENT
Start: 2017-08-31 | End: 2017-08-31

## 2017-08-31 RX ORDER — FAMOTIDINE 10 MG/ML
20 INJECTION, SOLUTION INTRAVENOUS ONCE
Status: COMPLETED | OUTPATIENT
Start: 2017-08-31 | End: 2017-08-31

## 2017-08-31 RX ORDER — METHYLPREDNISOLONE ACETATE 40 MG/ML
INJECTION, SUSPENSION INTRA-ARTICULAR; INTRALESIONAL; INTRAMUSCULAR; SOFT TISSUE AS NEEDED
Status: DISCONTINUED | OUTPATIENT
Start: 2017-08-31 | End: 2017-08-31 | Stop reason: HOSPADM

## 2017-08-31 RX ORDER — NALOXONE HCL 0.4 MG/ML
0.4 VIAL (ML) INJECTION AS NEEDED
Status: DISCONTINUED | OUTPATIENT
Start: 2017-08-31 | End: 2017-08-31 | Stop reason: HOSPADM

## 2017-08-31 RX ORDER — ONDANSETRON 2 MG/ML
INJECTION INTRAMUSCULAR; INTRAVENOUS AS NEEDED
Status: DISCONTINUED | OUTPATIENT
Start: 2017-08-31 | End: 2017-08-31 | Stop reason: SURG

## 2017-08-31 RX ORDER — HYDROMORPHONE HCL 110MG/55ML
PATIENT CONTROLLED ANALGESIA SYRINGE INTRAVENOUS AS NEEDED
Status: DISCONTINUED | OUTPATIENT
Start: 2017-08-31 | End: 2017-08-31 | Stop reason: SURG

## 2017-08-31 RX ORDER — PROMETHAZINE HYDROCHLORIDE 25 MG/1
12.5 TABLET ORAL ONCE AS NEEDED
Status: DISCONTINUED | OUTPATIENT
Start: 2017-08-31 | End: 2017-08-31 | Stop reason: HOSPADM

## 2017-08-31 RX ORDER — PROMETHAZINE HYDROCHLORIDE 25 MG/ML
6.25 INJECTION, SOLUTION INTRAMUSCULAR; INTRAVENOUS ONCE AS NEEDED
Status: DISCONTINUED | OUTPATIENT
Start: 2017-08-31 | End: 2017-08-31 | Stop reason: HOSPADM

## 2017-08-31 RX ORDER — PROMETHAZINE HYDROCHLORIDE 25 MG/ML
12.5 INJECTION, SOLUTION INTRAMUSCULAR; INTRAVENOUS ONCE AS NEEDED
Status: DISCONTINUED | OUTPATIENT
Start: 2017-08-31 | End: 2017-08-31 | Stop reason: HOSPADM

## 2017-08-31 RX ORDER — LIDOCAINE HYDROCHLORIDE 20 MG/ML
INJECTION, SOLUTION INFILTRATION; PERINEURAL AS NEEDED
Status: DISCONTINUED | OUTPATIENT
Start: 2017-08-31 | End: 2017-08-31 | Stop reason: SURG

## 2017-08-31 RX ORDER — PROMETHAZINE HYDROCHLORIDE 25 MG/1
25 TABLET ORAL ONCE AS NEEDED
Status: DISCONTINUED | OUTPATIENT
Start: 2017-08-31 | End: 2017-08-31 | Stop reason: HOSPADM

## 2017-08-31 RX ORDER — SODIUM CHLORIDE, SODIUM LACTATE, POTASSIUM CHLORIDE, CALCIUM CHLORIDE 600; 310; 30; 20 MG/100ML; MG/100ML; MG/100ML; MG/100ML
9 INJECTION, SOLUTION INTRAVENOUS CONTINUOUS
Status: DISCONTINUED | OUTPATIENT
Start: 2017-08-31 | End: 2017-09-01 | Stop reason: HOSPADM

## 2017-08-31 RX ORDER — PROPOFOL 10 MG/ML
VIAL (ML) INTRAVENOUS AS NEEDED
Status: DISCONTINUED | OUTPATIENT
Start: 2017-08-31 | End: 2017-08-31 | Stop reason: SURG

## 2017-08-31 RX ORDER — ONDANSETRON 4 MG/1
4 TABLET, FILM COATED ORAL EVERY 6 HOURS PRN
Status: DISCONTINUED | OUTPATIENT
Start: 2017-08-31 | End: 2017-09-01 | Stop reason: HOSPADM

## 2017-08-31 RX ORDER — HYDROMORPHONE HCL IN 0.9% NACL 10 MG/50ML
PATIENT CONTROLLED ANALGESIA SYRINGE INTRAVENOUS CONTINUOUS
Status: DISCONTINUED | OUTPATIENT
Start: 2017-08-31 | End: 2017-09-01 | Stop reason: HOSPADM

## 2017-08-31 RX ORDER — ROCURONIUM BROMIDE 10 MG/ML
INJECTION, SOLUTION INTRAVENOUS AS NEEDED
Status: DISCONTINUED | OUTPATIENT
Start: 2017-08-31 | End: 2017-08-31 | Stop reason: SURG

## 2017-08-31 RX ORDER — NALOXONE HCL 0.4 MG/ML
0.1 VIAL (ML) INJECTION
Status: DISCONTINUED | OUTPATIENT
Start: 2017-08-31 | End: 2017-09-01 | Stop reason: HOSPADM

## 2017-08-31 RX ORDER — CEFAZOLIN SODIUM 2 G/100ML
2 INJECTION, SOLUTION INTRAVENOUS ONCE
Status: COMPLETED | OUTPATIENT
Start: 2017-08-31 | End: 2017-08-31

## 2017-08-31 RX ORDER — EPHEDRINE SULFATE 50 MG/ML
5 INJECTION, SOLUTION INTRAVENOUS ONCE AS NEEDED
Status: DISCONTINUED | OUTPATIENT
Start: 2017-08-31 | End: 2017-08-31 | Stop reason: HOSPADM

## 2017-08-31 RX ORDER — ONDANSETRON 2 MG/ML
4 INJECTION INTRAMUSCULAR; INTRAVENOUS ONCE AS NEEDED
Status: DISCONTINUED | OUTPATIENT
Start: 2017-08-31 | End: 2017-08-31 | Stop reason: HOSPADM

## 2017-08-31 RX ORDER — OXYCODONE HYDROCHLORIDE AND ACETAMINOPHEN 5; 325 MG/1; MG/1
2 TABLET ORAL EVERY 4 HOURS PRN
Status: DISCONTINUED | OUTPATIENT
Start: 2017-08-31 | End: 2017-09-01 | Stop reason: HOSPADM

## 2017-08-31 RX ORDER — CEFAZOLIN SODIUM 2 G/100ML
2 INJECTION, SOLUTION INTRAVENOUS EVERY 8 HOURS
Status: COMPLETED | OUTPATIENT
Start: 2017-08-31 | End: 2017-08-31

## 2017-08-31 RX ORDER — DIPHENHYDRAMINE HYDROCHLORIDE 50 MG/ML
6.25 INJECTION INTRAMUSCULAR; INTRAVENOUS
Status: DISCONTINUED | OUTPATIENT
Start: 2017-08-31 | End: 2017-08-31 | Stop reason: HOSPADM

## 2017-08-31 RX ORDER — ONDANSETRON 2 MG/ML
4 INJECTION INTRAMUSCULAR; INTRAVENOUS EVERY 6 HOURS PRN
Status: DISCONTINUED | OUTPATIENT
Start: 2017-08-31 | End: 2017-09-01 | Stop reason: HOSPADM

## 2017-08-31 RX ORDER — MIDAZOLAM HYDROCHLORIDE 1 MG/ML
1 INJECTION INTRAMUSCULAR; INTRAVENOUS
Status: DISCONTINUED | OUTPATIENT
Start: 2017-08-31 | End: 2017-08-31 | Stop reason: HOSPADM

## 2017-08-31 RX ORDER — SODIUM CHLORIDE AND POTASSIUM CHLORIDE 150; 450 MG/100ML; MG/100ML
100 INJECTION, SOLUTION INTRAVENOUS CONTINUOUS
Status: DISCONTINUED | OUTPATIENT
Start: 2017-08-31 | End: 2017-09-01 | Stop reason: HOSPADM

## 2017-08-31 RX ORDER — SENNA AND DOCUSATE SODIUM 50; 8.6 MG/1; MG/1
1 TABLET, FILM COATED ORAL 2 TIMES DAILY
Status: DISCONTINUED | OUTPATIENT
Start: 2017-08-31 | End: 2017-09-01 | Stop reason: HOSPADM

## 2017-08-31 RX ORDER — FLUMAZENIL 0.1 MG/ML
0.2 INJECTION INTRAVENOUS AS NEEDED
Status: DISCONTINUED | OUTPATIENT
Start: 2017-08-31 | End: 2017-08-31 | Stop reason: HOSPADM

## 2017-08-31 RX ORDER — HYDROMORPHONE HYDROCHLORIDE 1 MG/ML
0.5 INJECTION, SOLUTION INTRAMUSCULAR; INTRAVENOUS; SUBCUTANEOUS
Status: DISCONTINUED | OUTPATIENT
Start: 2017-08-31 | End: 2017-08-31 | Stop reason: HOSPADM

## 2017-08-31 RX ADMIN — POTASSIUM CHLORIDE AND SODIUM CHLORIDE 100 ML/HR: 450; 150 INJECTION, SOLUTION INTRAVENOUS at 13:45

## 2017-08-31 RX ADMIN — FENTANYL CITRATE 25 MCG: 50 INJECTION INTRAMUSCULAR; INTRAVENOUS at 09:51

## 2017-08-31 RX ADMIN — DIPHENHYDRAMINE HYDROCHLORIDE 6.25 MG: 50 INJECTION, SOLUTION INTRAMUSCULAR; INTRAVENOUS at 10:13

## 2017-08-31 RX ADMIN — Medication: at 09:21

## 2017-08-31 RX ADMIN — PHENYLEPHRINE HYDROCHLORIDE 100 MCG: 10 INJECTION INTRAVENOUS at 07:42

## 2017-08-31 RX ADMIN — DEXAMETHASONE SODIUM PHOSPHATE 8 MG: 10 INJECTION INTRAMUSCULAR; INTRAVENOUS at 07:39

## 2017-08-31 RX ADMIN — FENTANYL CITRATE 25 MCG: 50 INJECTION INTRAMUSCULAR; INTRAVENOUS at 09:29

## 2017-08-31 RX ADMIN — PHENYLEPHRINE HYDROCHLORIDE 100 MCG: 10 INJECTION INTRAVENOUS at 07:41

## 2017-08-31 RX ADMIN — EPHEDRINE SULFATE 10 MG: 50 INJECTION INTRAMUSCULAR; INTRAVENOUS; SUBCUTANEOUS at 07:53

## 2017-08-31 RX ADMIN — FENTANYL CITRATE 100 MCG: 50 INJECTION INTRAMUSCULAR; INTRAVENOUS at 07:33

## 2017-08-31 RX ADMIN — HYDRALAZINE HYDROCHLORIDE 5 MG: 20 INJECTION INTRAMUSCULAR; INTRAVENOUS at 10:17

## 2017-08-31 RX ADMIN — OXYCODONE HYDROCHLORIDE AND ACETAMINOPHEN 2 TABLET: 5; 325 TABLET ORAL at 20:17

## 2017-08-31 RX ADMIN — EPHEDRINE SULFATE 5 MG: 50 INJECTION INTRAMUSCULAR; INTRAVENOUS; SUBCUTANEOUS at 08:11

## 2017-08-31 RX ADMIN — HYDROMORPHONE HYDROCHLORIDE 0.5 MG: 1 INJECTION, SOLUTION INTRAMUSCULAR; INTRAVENOUS; SUBCUTANEOUS at 09:39

## 2017-08-31 RX ADMIN — HYDROMORPHONE HYDROCHLORIDE 0.5 MG: 2 INJECTION, SOLUTION INTRAMUSCULAR; INTRAVENOUS; SUBCUTANEOUS at 08:59

## 2017-08-31 RX ADMIN — HYDROMORPHONE HYDROCHLORIDE 0.5 MG: 1 INJECTION, SOLUTION INTRAMUSCULAR; INTRAVENOUS; SUBCUTANEOUS at 10:00

## 2017-08-31 RX ADMIN — LIDOCAINE HYDROCHLORIDE 80 MG: 20 INJECTION, SOLUTION INFILTRATION; PERINEURAL at 07:36

## 2017-08-31 RX ADMIN — CEFAZOLIN SODIUM 2 G: 2 INJECTION, SOLUTION INTRAVENOUS at 14:18

## 2017-08-31 RX ADMIN — SUCCINYLCHOLINE CHLORIDE 140 MG: 20 INJECTION, SOLUTION INTRAMUSCULAR; INTRAVENOUS; PARENTERAL at 07:37

## 2017-08-31 RX ADMIN — MIDAZOLAM 2 MG: 1 INJECTION INTRAMUSCULAR; INTRAVENOUS at 07:05

## 2017-08-31 RX ADMIN — CEFAZOLIN SODIUM 2 G: 2 INJECTION, SOLUTION INTRAVENOUS at 07:39

## 2017-08-31 RX ADMIN — HYDROMORPHONE HYDROCHLORIDE 0.5 MG: 1 INJECTION, SOLUTION INTRAMUSCULAR; INTRAVENOUS; SUBCUTANEOUS at 09:17

## 2017-08-31 RX ADMIN — DOCUSATE SODIUM -SENNOSIDES 1 TABLET: 50; 8.6 TABLET, COATED ORAL at 17:50

## 2017-08-31 RX ADMIN — PROPOFOL 200 MG: 10 INJECTION, EMULSION INTRAVENOUS at 07:36

## 2017-08-31 RX ADMIN — HYDROMORPHONE HYDROCHLORIDE 0.5 MG: 2 INJECTION, SOLUTION INTRAMUSCULAR; INTRAVENOUS; SUBCUTANEOUS at 07:39

## 2017-08-31 RX ADMIN — ROCURONIUM BROMIDE 10 MG: 10 INJECTION INTRAVENOUS at 07:36

## 2017-08-31 RX ADMIN — FENTANYL CITRATE 25 MCG: 50 INJECTION INTRAMUSCULAR; INTRAVENOUS at 09:16

## 2017-08-31 RX ADMIN — FAMOTIDINE 20 MG: 10 INJECTION, SOLUTION INTRAVENOUS at 07:03

## 2017-08-31 RX ADMIN — FENTANYL CITRATE 25 MCG: 50 INJECTION INTRAMUSCULAR; INTRAVENOUS at 10:27

## 2017-08-31 RX ADMIN — SODIUM CHLORIDE, POTASSIUM CHLORIDE, SODIUM LACTATE AND CALCIUM CHLORIDE: 600; 310; 30; 20 INJECTION, SOLUTION INTRAVENOUS at 07:28

## 2017-08-31 RX ADMIN — ACETAMINOPHEN 1000 MG: 500 TABLET ORAL at 07:04

## 2017-08-31 RX ADMIN — EPHEDRINE SULFATE 10 MG: 50 INJECTION INTRAMUSCULAR; INTRAVENOUS; SUBCUTANEOUS at 07:41

## 2017-08-31 RX ADMIN — PHENYLEPHRINE HYDROCHLORIDE 100 MCG: 10 INJECTION INTRAVENOUS at 07:40

## 2017-08-31 RX ADMIN — ONDANSETRON 4 MG: 2 INJECTION INTRAMUSCULAR; INTRAVENOUS at 08:41

## 2017-08-31 RX ADMIN — CEFAZOLIN SODIUM 2 G: 2 INJECTION, SOLUTION INTRAVENOUS at 21:05

## 2017-08-31 RX ADMIN — SODIUM CHLORIDE, POTASSIUM CHLORIDE, SODIUM LACTATE AND CALCIUM CHLORIDE 100 ML/HR: 600; 310; 30; 20 INJECTION, SOLUTION INTRAVENOUS at 06:42

## 2017-08-31 RX ADMIN — EPHEDRINE SULFATE 10 MG: 50 INJECTION INTRAMUSCULAR; INTRAVENOUS; SUBCUTANEOUS at 07:44

## 2017-08-31 RX ADMIN — LIDOCAINE HYDROCHLORIDE 1 EACH: 40 SPRAY LARYNGEAL; TRANSTRACHEAL at 07:37

## 2017-09-01 VITALS
OXYGEN SATURATION: 96 % | TEMPERATURE: 98.3 F | WEIGHT: 229.56 LBS | DIASTOLIC BLOOD PRESSURE: 83 MMHG | SYSTOLIC BLOOD PRESSURE: 151 MMHG | HEIGHT: 72 IN | RESPIRATION RATE: 20 BRPM | HEART RATE: 82 BPM | BODY MASS INDEX: 31.09 KG/M2

## 2017-09-01 PROCEDURE — 25810000003 POTASSIUM CHLORIDE PER 2 MEQ: Performed by: ORTHOPAEDIC SURGERY

## 2017-09-01 PROCEDURE — 99024 POSTOP FOLLOW-UP VISIT: CPT | Performed by: ORTHOPAEDIC SURGERY

## 2017-09-01 RX ORDER — SENNA AND DOCUSATE SODIUM 50; 8.6 MG/1; MG/1
1 TABLET, FILM COATED ORAL 2 TIMES DAILY
Qty: 30 TABLET | Refills: 0 | Status: SHIPPED | OUTPATIENT
Start: 2017-09-01 | End: 2017-09-15

## 2017-09-01 RX ORDER — OXYCODONE HYDROCHLORIDE AND ACETAMINOPHEN 5; 325 MG/1; MG/1
2 TABLET ORAL EVERY 4 HOURS PRN
Qty: 40 TABLET | Refills: 0 | Status: SHIPPED | OUTPATIENT
Start: 2017-09-01 | End: 2017-09-10

## 2017-09-01 RX ADMIN — OXYCODONE HYDROCHLORIDE AND ACETAMINOPHEN 2 TABLET: 5; 325 TABLET ORAL at 00:58

## 2017-09-01 RX ADMIN — POTASSIUM CHLORIDE AND SODIUM CHLORIDE 100 ML/HR: 450; 150 INJECTION, SOLUTION INTRAVENOUS at 00:54

## 2017-09-01 RX ADMIN — OXYCODONE HYDROCHLORIDE AND ACETAMINOPHEN 2 TABLET: 5; 325 TABLET ORAL at 05:44

## 2017-09-01 NOTE — DISCHARGE SUMMARY
Orthopedic Discharge Summary      Patient: Harvinder Vega  YOB: 1960  Medical Record Number: [id number]    Attending Physician: Galindo Hernandes MD  Consulting Physician(s):     Date of Admission: 8/31/2017  6:01 AM  Date of Discharge:     Discharge Diagnosis: Left L4-5, L5-S1 laminectomy,         Presenting Problem/History of Present Illness: Spinal stenosis of lumbar region [M48.06]  Spinal stenosis of lumbar region [M48.06]        Allergies: No Known Allergies    Discharge Medications   Harvinder Vega   Home Medication Instructions FER:[id number]    Printed on:09/01/17 0625   Medication Information                      acetaminophen (TYLENOL) 325 MG tablet  Take 650 mg by mouth As Needed for mild pain (1-3).             naproxen sodium (ALEVE) 220 MG tablet  Take 220 mg by mouth As Needed for mild pain (1-3).             oxyCODONE-acetaminophen (PERCOCET) 5-325 MG per tablet  Take 2 tablets by mouth Every 4 (Four) Hours As Needed for Moderate Pain  or Severe Pain  for up to 9 days.             sennosides-docusate sodium (SENOKOT-S) 8.6-50 MG tablet  Take 1 tablet by mouth 2 (Two) Times a Day.                   Past Medical History:   Diagnosis Date   • Arthritis    • Claustrophobia    • Herniated intervertebral disc of lumbar spine    • Low back pain         Past Surgical History:   Procedure Laterality Date   • GANGLION CYST EXCISION Right     ANKLE   • KNEE SURGERY Right 2013   • LUMBAR EPIDURAL INJECTION     • ROTATOR CUFF REPAIR Right    • SHOULDER SURGERY Right 2013   • TONSILECTOMY, ADENOIDECTOMY, BILATERAL MYRINGOTOMY AND TUBES      1962        Social History     Occupational History   • Not on file.     Social History Main Topics   • Smoking status: Current Every Day Smoker     Packs/day: 1.00     Years: 30.00   • Smokeless tobacco: Never Used   • Alcohol use No   • Drug use: Defer   • Sexual activity: Defer      Social History     Social History Narrative        Family History   Problem  Relation Age of Onset   • Heart failure Mother    • Heart failure Father    • Malig Hyperthermia Neg Hx          Physical Exam: 57 y.o. male  General Appearance:    Alert, cooperative, in no acute distress                      Vitals:    08/31/17 1245 08/31/17 1423 08/31/17 2100 09/01/17 0200   BP:  159/95 141/88 119/70   BP Location: Right arm  Right arm Right arm   Patient Position: Sitting  Sitting Sitting   Pulse: 79 91 94 71   Resp: 16 20 20 20   Temp: 98.1 °F (36.7 °C)  98.3 °F (36.8 °C) 97.7 °F (36.5 °C)   TempSrc: Oral Oral Oral Oral   SpO2:  96% 94% 96%   Weight:       Height:            DIAGNOSTIC TESTS:   Admission on 08/31/2017   Component Date Value Ref Range Status   • ABO Type 08/31/2017 AB   Final   • RH type 08/31/2017 Positive   Final   • Antibody Screen 08/31/2017 Negative   Final       Xr Spine Lumbar 2 Or 3 View    Result Date: 8/31/2017  Narrative: INTRAOPERATIVE LUMBAR SPINE: 2 IMAGES  HISTORY: Male who is 57 years-old, with a history of low back pain for fusion.  C-arm generated imaging, and fluoroscopic guidance provided intraoperatively to Dr. JOSE HERNANDES for localization purposes during fusion.  Localization device projects at the posterior aspect of L4-L5 on lateral view image #2.  FLUOROSCOPY TIME: 6 seconds, 2 images.  This report was finalized on 8/31/2017 9:20 AM by Dr. Xavier Perez MD.      Fl C Arm During Surgery    Result Date: 8/31/2017  Narrative: This procedure was auto-finalized with no dictation required.      Hospital Course:  57 y.o. male admitted to [facility] to services of Jose Hernandes MD with Spinal stenosis of lumbar region [M48.06]  Spinal stenosis of lumbar region [M48.06] on 8/31/2017 and underwent Left L4-5, L5-S1 laminectomy  Per Jose Hernandes MD. Antibiotic and VTE prophylaxis were per SCIP protocols.  The patient was admitted to the floor where IV and/or oral pain medications were administered for postoperative pain.  At discharge the incisional  pain was tolerable and preop neurologic function was intact.  The dressing was dry and the wound was clean.Preoperative leg pain was improved.    Condition on Discharge:  Stable    Discharge Instructions: . Patient may weight bear as tolerated unless otherwise specified. Continue EJ hose daily (for two weeks) and ice regularly. Patient also instructed on incentive spirometer during hospitalization and encouraged to continue to use at home regularly. Patient may shower on POD #3 if and when all wound drainage has stopped.  Where applicable, the brace should be worn when up and about.  It need not be worn to the bathroom and certainly not in the shower.  A detailed list of instructions specific to the operation was given to the patient at the time of discharge.    Follow up Instructions:  Follow up in the office with Dr. Galindo Hernandes Jr. in 2-3 weeks - patient to call the office at [phone] to schedule. Prescriptions were given for pain medication.    Follow-up Appointments  No future appointments.      Discharge Disposition Plan:today to home    Date: 9/1/2017    Galindo Hernandes MD

## 2017-09-01 NOTE — PAYOR COMM NOTE
"Harvinder Vega (57 y.o. Male)     Date of Birth Social Security Number Address Home Phone MRN    1960  [address] [facility] [address] [phone] [id number]    Tenri[employer] Marital Status          Unknown        Admission Date Admission Type Admitting Provider Attending Provider Department, Room/Bed    8/31/17 Elective Galindo Hernandes MD  [address], P599/1    Discharge Date Discharge Disposition Discharge Destination        9/1/2017 Home or Self Care             Attending Provider: (none)    Allergies:  No Known Allergies    Isolation:  None   Infection:  None   Code Status:  Prior    Ht:  72\" (182.9 cm)   Wt:  229 lb 9 oz (104 kg)    Admission Cmt:  None   Principal Problem:  Spinal stenosis of lumbar region [M48.06] More...                 Active Insurance as of 8/31/2017     Primary Coverage     Payor Plan Insurance Group Employer/Plan Group    [employer] BLUE [employer]OSS [employer]UE [employer] PPO [id number]     Payor Plan Address Payor Plan Phone Number Effective From Effective To    [address] [phone] 2/1/2016     [place] [address]       Subscriber Name Subscriber Birth Date Member ID       HARVINDER VEGA 1960 [id number]                 Emergency Contacts      (Rel.) Home Phone Work Phone Mobile Phone    Ana Laura Vega (Spouse) [phone] -- [phone]               Discharge Summary      Galindo Hernandes MD at 9/1/2017  6:25 AM           Orthopedic Discharge Summary      Patient: Harvinder Vega  YOB: 1960  Medical Record Number: [id number]    Attending Physician: Galindo Hernandes MD  Consulting Physician(s):     Date of Admission: 8/31/2017  6:01 AM  Date of Discharge: 9/1/2017    Discharge Diagnosis: Left L4-5, L5-S1 laminectomy,         Presenting Problem/History of Present Illness: Spinal stenosis of lumbar region [M48.06]  Spinal stenosis of lumbar region [M48.06]        Allergies: No Known " Allergies    Discharge Medications   Harvinder Vega   Home Medication Instructions FER:[id number]    Printed on:09/01/17 0602   Medication Information                      acetaminophen (TYLENOL) 325 MG tablet  Take 650 mg by mouth As Needed for mild pain (1-3).             naproxen sodium (ALEVE) 220 MG tablet  Take 220 mg by mouth As Needed for mild pain (1-3).             oxyCODONE-acetaminophen (PERCOCET) 5-325 MG per tablet  Take 2 tablets by mouth Every 4 (Four) Hours As Needed for Moderate Pain  or Severe Pain  for up to 9 days.             sennosides-docusate sodium (SENOKOT-S) 8.6-50 MG tablet  Take 1 tablet by mouth 2 (Two) Times a Day.                   Past Medical History:   Diagnosis Date   • Arthritis    • Claustrophobia    • Herniated intervertebral disc of lumbar spine    • Low back pain         Past Surgical History:   Procedure Laterality Date   • GANGLION CYST EXCISION Right     ANKLE   • KNEE SURGERY Right 2013   • LUMBAR EPIDURAL INJECTION     • ROTATOR CUFF REPAIR Right    • SHOULDER SURGERY Right 2013   • TONSILECTOMY, ADENOIDECTOMY, BILATERAL MYRINGOTOMY AND TUBES      1962        Social History     Occupational History   • Not on file.     Social History Main Topics   • Smoking status: Current Every Day Smoker     Packs/day: 1.00     Years: 30.00   • Smokeless tobacco: Never Used   • Alcohol use No   • Drug use: Defer   • Sexual activity: Defer      Social History     Social History Narrative        Family History   Problem Relation Age of Onset   • Heart failure Mother    • Heart failure Father    • Malig Hyperthermia Neg Hx          Physical Exam: 57 y.o. male  General Appearance:    Alert, cooperative, in no acute distress                      Vitals:    08/31/17 1245 08/31/17 1423 08/31/17 2100 09/01/17 0200   BP:  159/95 141/88 119/70   BP Location: Right arm  Right arm Right arm   Patient Position: Sitting  Sitting Sitting   Pulse: 79 91 94 71   Resp: 16 20 20 20   Temp: 98.1 °F  (36.7 °C)  98.3 °F (36.8 °C) 97.7 °F (36.5 °C)   TempSrc: Oral Oral Oral Oral   SpO2:  96% 94% 96%   Weight:       Height:            DIAGNOSTIC TESTS:   Admission on 08/31/2017   Component Date Value Ref Range Status   • ABO Type 08/31/2017 AB   Final   • RH type 08/31/2017 Positive   Final   • Antibody Screen 08/31/2017 Negative   Final       Xr Spine Lumbar 2 Or 3 View    Result Date: 8/31/2017  Narrative: INTRAOPERATIVE LUMBAR SPINE: 2 IMAGES  HISTORY: Male who is 57 years-old, with a history of low back pain for fusion.  C-arm generated imaging, and fluoroscopic guidance provided intraoperatively to Dr. JOSE HERNANDES for localization purposes during fusion.  Localization device projects at the posterior aspect of L4-L5 on lateral view image #2.  FLUOROSCOPY TIME: 6 seconds, 2 images.  This report was finalized on 8/31/2017 9:20 AM by Dr. Xavier Perez MD.      Fl C Arm During Surgery    Result Date: 8/31/2017  Narrative: This procedure was auto-finalized with no dictation required.      Hospital Course:  57 y.o. male admitted to [facility] to services of Jose Hernandes MD with Spinal stenosis of lumbar region [M48.06]  Spinal stenosis of lumbar region [M48.06] on 8/31/2017 and underwent Left L4-5, L5-S1 laminectomy  Per Jose Hernandes MD. Antibiotic and VTE prophylaxis were per SCIP protocols.  The patient was admitted to the floor where IV and/or oral pain medications were administered for postoperative pain.  At discharge the incisional pain was tolerable and preop neurologic function was intact.  The dressing was dry and the wound was clean.Preoperative leg pain was improved.    Condition on Discharge:  Stable    Discharge Instructions: . Patient may weight bear as tolerated unless otherwise specified. Continue EJ hose daily (for two weeks) and ice regularly. Patient also instructed on incentive spirometer during hospitalization and encouraged to continue to use at home regularly. Patient may  shower on POD #3 if and when all wound drainage has stopped.  Where applicable, the brace should be worn when up and about.  It need not be worn to the bathroom and certainly not in the shower.  A detailed list of instructions specific to the operation was given to the patient at the time of discharge.    Follow up Instructions:  Follow up in the office with Dr. Galindo Hernandes Jr. in 2-3 weeks - patient to call the office at [phone] to schedule. Prescriptions were given for pain medication.    Follow-up Appointments  No future appointments.      Discharge Disposition Plan:today to home    Date: 9/1/2017    Galindo Hernandes MD           Electronically signed by Galindo Hernandes MD at 9/1/2017  6:26 AM

## 2017-09-01 NOTE — PLAN OF CARE
Problem: Patient Care Overview (Adult)  Goal: Plan of Care Review    09/01/17 0520   Coping/Psychosocial Response Interventions   Plan Of Care Reviewed With patient   Patient Care Overview   Progress progress toward functional goals as expected   Outcome Evaluation   Outcome Summary/Follow up Plan doing well post-op, ambulated around the unit a few times during the night, pain controlled, vss

## 2017-09-15 ENCOUNTER — OFFICE VISIT (OUTPATIENT)
Dept: ORTHOPEDIC SURGERY | Facility: CLINIC | Age: 57
End: 2017-09-15

## 2017-09-15 VITALS — BODY MASS INDEX: 31.02 KG/M2 | WEIGHT: 229 LBS | HEIGHT: 72 IN | TEMPERATURE: 98.2 F

## 2017-09-15 DIAGNOSIS — M48.061 SPINAL STENOSIS OF LUMBAR REGION: Primary | ICD-10-CM

## 2017-09-15 PROCEDURE — 99024 POSTOP FOLLOW-UP VISIT: CPT | Performed by: ORTHOPAEDIC SURGERY

## 2017-09-15 RX ORDER — OXYCODONE HYDROCHLORIDE AND ACETAMINOPHEN 5; 325 MG/1; MG/1
2 TABLET ORAL EVERY 4 HOURS PRN
COMMUNITY
End: 2017-09-15 | Stop reason: SDUPTHER

## 2017-09-15 RX ORDER — OXYCODONE HYDROCHLORIDE AND ACETAMINOPHEN 5; 325 MG/1; MG/1
2 TABLET ORAL EVERY 4 HOURS PRN
Qty: 40 TABLET | Refills: 0 | Status: SHIPPED | OUTPATIENT
Start: 2017-09-15 | End: 2017-10-03 | Stop reason: SDUPTHER

## 2017-09-15 NOTE — PROGRESS NOTES
2 weeks out from lumbar laminectomy leg pain markedly better back pain tolerable wound healing nicely neurologic intact.  Doing well follow-up in a month refill meds.

## 2017-09-29 DIAGNOSIS — R52 PAIN: Primary | ICD-10-CM

## 2017-10-03 RX ORDER — OXYCODONE HYDROCHLORIDE AND ACETAMINOPHEN 5; 325 MG/1; MG/1
2 TABLET ORAL EVERY 4 HOURS PRN
Qty: 40 TABLET | Refills: 0 | Status: SHIPPED | OUTPATIENT
Start: 2017-10-03 | End: 2017-10-17 | Stop reason: SDUPTHER

## 2017-10-17 ENCOUNTER — OFFICE VISIT (OUTPATIENT)
Dept: ORTHOPEDIC SURGERY | Facility: CLINIC | Age: 57
End: 2017-10-17

## 2017-10-17 VITALS — TEMPERATURE: 98.4 F | HEIGHT: 72 IN | BODY MASS INDEX: 31.02 KG/M2 | WEIGHT: 229 LBS

## 2017-10-17 DIAGNOSIS — M48.062 SPINAL STENOSIS OF LUMBAR REGION WITH NEUROGENIC CLAUDICATION: Primary | ICD-10-CM

## 2017-10-17 DIAGNOSIS — R52 PAIN: ICD-10-CM

## 2017-10-17 PROCEDURE — 99024 POSTOP FOLLOW-UP VISIT: CPT | Performed by: ORTHOPAEDIC SURGERY

## 2017-10-17 RX ORDER — OXYCODONE HYDROCHLORIDE AND ACETAMINOPHEN 5; 325 MG/1; MG/1
2 TABLET ORAL EVERY 4 HOURS PRN
Qty: 40 TABLET | Refills: 0 | Status: SHIPPED | OUTPATIENT
Start: 2017-10-17 | End: 2017-12-13

## 2017-10-17 RX ORDER — ETODOLAC 500 MG/1
500 TABLET, FILM COATED ORAL 2 TIMES DAILY
Qty: 60 TABLET | Refills: 4 | Status: ON HOLD | OUTPATIENT
Start: 2017-10-17 | End: 2021-04-05

## 2017-10-17 NOTE — PROGRESS NOTES
He is status post 2 level lumbar laminectomy August 31.  Pain is much better but is not well.  Still some residual pain but good strength in the legs.  The wound is healed nicely.  I'll refill his pain medicine for the last time, prescribed etodolac to replace it, and give him some home exercises to do I will see him back as needed

## 2017-12-13 ENCOUNTER — OFFICE VISIT (OUTPATIENT)
Dept: ORTHOPEDIC SURGERY | Facility: CLINIC | Age: 57
End: 2017-12-13

## 2017-12-13 VITALS — BODY MASS INDEX: 32.51 KG/M2 | WEIGHT: 240 LBS | HEIGHT: 72 IN | TEMPERATURE: 97.2 F

## 2017-12-13 DIAGNOSIS — M54.32 SCIATICA OF LEFT SIDE: Primary | ICD-10-CM

## 2017-12-13 PROCEDURE — 99024 POSTOP FOLLOW-UP VISIT: CPT | Performed by: ORTHOPAEDIC SURGERY

## 2017-12-13 RX ORDER — GABAPENTIN 300 MG/1
300 CAPSULE ORAL 2 TIMES DAILY
Qty: 120 CAPSULE | Refills: 3 | Status: SHIPPED | OUTPATIENT
Start: 2017-12-13 | End: 2018-01-16 | Stop reason: SDUPTHER

## 2017-12-13 NOTE — PROGRESS NOTES
He is 3-1/2 months out from lumbar laminectomy and has persistent buttock and radiating thigh pain.  Not much in way of back pain.  He is off narcotics now he denies weakness.  The pain sometimes interferes with sleep.  On exam he has intact patellar reflexes and good sensation in the lower extremities.  Strength looks good.  No x-rays today.  I'm going to put him on gabapentin 300 mg 2 by mouth twice a day for a total of 1200 mg per day.  I will see him back in 6 or 8 weeks.  No x-rays needed.

## 2018-01-16 RX ORDER — GABAPENTIN 300 MG/1
CAPSULE ORAL
Qty: 120 CAPSULE | Refills: 0 | Status: SHIPPED | OUTPATIENT
Start: 2018-01-16 | End: 2018-08-08 | Stop reason: SDUPTHER

## 2018-08-08 RX ORDER — GABAPENTIN 300 MG/1
300 CAPSULE ORAL 2 TIMES DAILY
Qty: 60 CAPSULE | Refills: 0 | Status: SHIPPED | OUTPATIENT
Start: 2018-08-08 | End: 2018-09-04 | Stop reason: SDUPTHER

## 2018-09-04 ENCOUNTER — OFFICE VISIT (OUTPATIENT)
Dept: ORTHOPEDIC SURGERY | Facility: CLINIC | Age: 58
End: 2018-09-04

## 2018-09-04 VITALS — HEIGHT: 71 IN | TEMPERATURE: 98.2 F | BODY MASS INDEX: 33.6 KG/M2 | WEIGHT: 240 LBS

## 2018-09-04 DIAGNOSIS — M54.50 LUMBAR SPINE PAIN: Primary | ICD-10-CM

## 2018-09-04 DIAGNOSIS — M48.062 SPINAL STENOSIS OF LUMBAR REGION WITH NEUROGENIC CLAUDICATION: ICD-10-CM

## 2018-09-04 PROCEDURE — 72100 X-RAY EXAM L-S SPINE 2/3 VWS: CPT | Performed by: ORTHOPAEDIC SURGERY

## 2018-09-04 PROCEDURE — 99213 OFFICE O/P EST LOW 20 MIN: CPT | Performed by: ORTHOPAEDIC SURGERY

## 2018-09-04 RX ORDER — GABAPENTIN 300 MG/1
CAPSULE ORAL
Qty: 60 CAPSULE | Refills: 0 | OUTPATIENT
Start: 2018-09-04 | End: 2018-10-05 | Stop reason: SDUPTHER

## 2018-09-06 NOTE — PROGRESS NOTES
His had some left leg pain radiating into the cavity which gabapentin helps.  The pain is significant enough that the he would consider surgery for it if it were required.  I asked him this to gauge the severity of the pain as he is now close to a year out from lumbar laminectomy from which she originally had some relief.  No further back pain.  Bernardino good strength in lower extremities bilaterally.  Sensation grossly intact.  I'm going to send him for myelogram CT scan and I anticipate that decompression and fusion may be needed I will call him with results.

## 2018-09-07 ENCOUNTER — TELEPHONE (OUTPATIENT)
Dept: ORTHOPEDIC SURGERY | Facility: CLINIC | Age: 58
End: 2018-09-07

## 2018-10-06 RX ORDER — GABAPENTIN 300 MG/1
CAPSULE ORAL
Qty: 60 CAPSULE | Refills: 0 | Status: SHIPPED | OUTPATIENT
Start: 2018-10-06 | End: 2018-11-05 | Stop reason: SDUPTHER

## 2018-11-05 RX ORDER — GABAPENTIN 300 MG/1
CAPSULE ORAL
Qty: 60 CAPSULE | Refills: 0 | Status: SHIPPED | OUTPATIENT
Start: 2018-11-05 | End: 2018-11-05 | Stop reason: SDUPTHER

## 2018-11-06 RX ORDER — GABAPENTIN 300 MG/1
300 CAPSULE ORAL 2 TIMES DAILY
Qty: 60 CAPSULE | Refills: 0 | Status: SHIPPED | OUTPATIENT
Start: 2018-11-06 | End: 2019-01-08 | Stop reason: SDUPTHER

## 2019-01-09 RX ORDER — GABAPENTIN 300 MG/1
CAPSULE ORAL
Qty: 60 CAPSULE | Refills: 0 | Status: SHIPPED | OUTPATIENT
Start: 2019-01-09 | End: 2019-02-10 | Stop reason: SDUPTHER

## 2019-02-11 RX ORDER — GABAPENTIN 300 MG/1
CAPSULE ORAL
Qty: 60 CAPSULE | Refills: 0 | Status: SHIPPED | OUTPATIENT
Start: 2019-02-11 | End: 2019-03-07 | Stop reason: SDUPTHER

## 2019-03-08 RX ORDER — GABAPENTIN 300 MG/1
CAPSULE ORAL
Qty: 60 CAPSULE | Refills: 0 | Status: SHIPPED | OUTPATIENT
Start: 2019-03-08 | End: 2019-04-08 | Stop reason: SDUPTHER

## 2019-04-08 RX ORDER — GABAPENTIN 300 MG/1
CAPSULE ORAL
Qty: 60 CAPSULE | Refills: 0 | Status: SHIPPED | OUTPATIENT
Start: 2019-04-08 | End: 2019-05-05 | Stop reason: SDUPTHER

## 2019-05-07 RX ORDER — GABAPENTIN 300 MG/1
CAPSULE ORAL
Qty: 60 CAPSULE | Refills: 0 | Status: SHIPPED | OUTPATIENT
Start: 2019-05-07 | End: 2019-06-06 | Stop reason: SDUPTHER

## 2019-06-07 RX ORDER — GABAPENTIN 300 MG/1
CAPSULE ORAL
Qty: 60 CAPSULE | Refills: 0 | Status: ON HOLD | OUTPATIENT
Start: 2019-06-07 | End: 2021-04-05

## 2019-12-18 ENCOUNTER — OFFICE VISIT (OUTPATIENT)
Dept: ORTHOPEDIC SURGERY | Facility: CLINIC | Age: 59
End: 2019-12-18

## 2019-12-18 VITALS — WEIGHT: 240 LBS | HEIGHT: 72 IN | BODY MASS INDEX: 32.51 KG/M2 | TEMPERATURE: 98.1 F

## 2019-12-18 DIAGNOSIS — M54.32 SCIATICA OF LEFT SIDE: Primary | ICD-10-CM

## 2019-12-18 PROCEDURE — 99213 OFFICE O/P EST LOW 20 MIN: CPT | Performed by: ORTHOPAEDIC SURGERY

## 2019-12-18 RX ORDER — NABUMETONE 500 MG/1
500 TABLET, FILM COATED ORAL 2 TIMES DAILY
Refills: 5 | COMMUNITY
Start: 2019-12-12 | End: 2021-04-06 | Stop reason: HOSPADM

## 2019-12-18 NOTE — PROGRESS NOTES
He still has some left buttock and thigh pain 2 years after L4-5 L5-S1 left laminectomy.  We were going to get a myelogram and CT scan last fall, but his wife  suddenly from an MI.  Presently is not at a point where surgery would be contemplated so we do not want to order another test.  On exam he has good strength in lower extremities straight leg raise is negative.  I think he probably has some scarring or possibly residual stenosis but nothing bad enough that he wants to pursue more vigorous treatment at this point.  He may try chiropractic he and I have also given him a recommendation for lidocaine or patches.  I will see him as needed.

## 2020-12-14 ENCOUNTER — TELEPHONE (OUTPATIENT)
Dept: CARDIAC REHAB | Facility: HOSPITAL | Age: 60
End: 2020-12-14

## 2020-12-14 NOTE — TELEPHONE ENCOUNTER
We have been trying to reach pt to schedule for outpatient cardiac rehab. Left message for him on 12/2 and again today. If no call back from pt will assume he is not interested. We originally received a referral from [facility] [place] to contact patient.

## 2020-12-17 ENCOUNTER — TRANSCRIBE ORDERS (OUTPATIENT)
Dept: CARDIAC REHAB | Facility: HOSPITAL | Age: 60
End: 2020-12-17

## 2020-12-17 DIAGNOSIS — Z95.1 S/P CABG X 4: ICD-10-CM

## 2020-12-17 DIAGNOSIS — I21.4 NON-STEMI (NON-ST ELEVATED MYOCARDIAL INFARCTION) (HCC): Primary | ICD-10-CM

## 2020-12-18 ENCOUNTER — OFFICE VISIT (OUTPATIENT)
Dept: CARDIAC REHAB | Facility: HOSPITAL | Age: 60
End: 2020-12-18

## 2020-12-18 DIAGNOSIS — Z95.1 S/P CABG X 4: ICD-10-CM

## 2020-12-18 DIAGNOSIS — I21.4 NON-STEMI (NON-ST ELEVATED MYOCARDIAL INFARCTION) (HCC): Primary | ICD-10-CM

## 2020-12-18 PROCEDURE — 93797 PHYS/QHP OP CAR RHAB WO ECG: CPT

## 2020-12-28 ENCOUNTER — TREATMENT (OUTPATIENT)
Dept: CARDIAC REHAB | Facility: HOSPITAL | Age: 60
End: 2020-12-28

## 2020-12-28 DIAGNOSIS — I21.4 NON-STEMI (NON-ST ELEVATED MYOCARDIAL INFARCTION) (HCC): Primary | ICD-10-CM

## 2020-12-28 PROCEDURE — 93798 PHYS/QHP OP CAR RHAB W/ECG: CPT

## 2020-12-30 ENCOUNTER — TREATMENT (OUTPATIENT)
Dept: CARDIAC REHAB | Facility: HOSPITAL | Age: 60
End: 2020-12-30

## 2020-12-30 DIAGNOSIS — I21.4 NON-STEMI (NON-ST ELEVATED MYOCARDIAL INFARCTION) (HCC): Primary | ICD-10-CM

## 2020-12-30 PROCEDURE — 93798 PHYS/QHP OP CAR RHAB W/ECG: CPT

## 2021-01-04 ENCOUNTER — TREATMENT (OUTPATIENT)
Dept: CARDIAC REHAB | Facility: HOSPITAL | Age: 61
End: 2021-01-04

## 2021-01-04 DIAGNOSIS — I21.4 NON-STEMI (NON-ST ELEVATED MYOCARDIAL INFARCTION) (HCC): Primary | ICD-10-CM

## 2021-01-04 PROCEDURE — 93798 PHYS/QHP OP CAR RHAB W/ECG: CPT

## 2021-01-08 ENCOUNTER — TREATMENT (OUTPATIENT)
Dept: CARDIAC REHAB | Facility: HOSPITAL | Age: 61
End: 2021-01-08

## 2021-01-08 DIAGNOSIS — I21.4 NON-STEMI (NON-ST ELEVATED MYOCARDIAL INFARCTION) (HCC): Primary | ICD-10-CM

## 2021-01-08 PROCEDURE — 93798 PHYS/QHP OP CAR RHAB W/ECG: CPT

## 2021-01-11 ENCOUNTER — TREATMENT (OUTPATIENT)
Dept: CARDIAC REHAB | Facility: HOSPITAL | Age: 61
End: 2021-01-11

## 2021-01-11 DIAGNOSIS — I21.4 NON-STEMI (NON-ST ELEVATED MYOCARDIAL INFARCTION) (HCC): Primary | ICD-10-CM

## 2021-01-11 PROCEDURE — 93798 PHYS/QHP OP CAR RHAB W/ECG: CPT

## 2021-01-13 ENCOUNTER — TREATMENT (OUTPATIENT)
Dept: CARDIAC REHAB | Facility: HOSPITAL | Age: 61
End: 2021-01-13

## 2021-01-13 DIAGNOSIS — I21.4 NON-STEMI (NON-ST ELEVATED MYOCARDIAL INFARCTION) (HCC): Primary | ICD-10-CM

## 2021-01-13 PROCEDURE — 93798 PHYS/QHP OP CAR RHAB W/ECG: CPT

## 2021-01-15 ENCOUNTER — TREATMENT (OUTPATIENT)
Dept: CARDIAC REHAB | Facility: HOSPITAL | Age: 61
End: 2021-01-15

## 2021-01-15 DIAGNOSIS — I21.4 NON-STEMI (NON-ST ELEVATED MYOCARDIAL INFARCTION) (HCC): Primary | ICD-10-CM

## 2021-01-15 PROCEDURE — 93798 PHYS/QHP OP CAR RHAB W/ECG: CPT

## 2021-01-18 ENCOUNTER — TREATMENT (OUTPATIENT)
Dept: CARDIAC REHAB | Facility: HOSPITAL | Age: 61
End: 2021-01-18

## 2021-01-18 DIAGNOSIS — Z95.1 S/P CABG X 4: ICD-10-CM

## 2021-01-18 DIAGNOSIS — I21.4 NON-STEMI (NON-ST ELEVATED MYOCARDIAL INFARCTION) (HCC): Primary | ICD-10-CM

## 2021-01-18 PROCEDURE — 36415 COLL VENOUS BLD VENIPUNCTURE: CPT

## 2021-01-18 PROCEDURE — 99283 EMERGENCY DEPT VISIT LOW MDM: CPT

## 2021-01-18 PROCEDURE — 93798 PHYS/QHP OP CAR RHAB W/ECG: CPT

## 2021-01-18 PROCEDURE — 99282 EMERGENCY DEPT VISIT SF MDM: CPT

## 2021-01-19 ENCOUNTER — HOSPITAL ENCOUNTER (EMERGENCY)
Facility: HOSPITAL | Age: 61
Discharge: HOME | End: 2021-01-19
Attending: EMERGENCY MEDICINE | Admitting: EMERGENCY MEDICINE

## 2021-01-19 VITALS
HEART RATE: 69 BPM | TEMPERATURE: 97.1 F | DIASTOLIC BLOOD PRESSURE: 98 MMHG | SYSTOLIC BLOOD PRESSURE: 163 MMHG | RESPIRATION RATE: 18 BRPM | OXYGEN SATURATION: 95 %

## 2021-01-19 DIAGNOSIS — R04.0 RIGHT-SIDED EPISTAXIS: Primary | ICD-10-CM

## 2021-01-19 LAB
ALBUMIN SERPL-MCNC: 4.5 G/DL (ref 3.5–5.2)
ALBUMIN/GLOB SERPL: 1.9 G/DL
ALP SERPL-CCNC: 88 U/L (ref 39–117)
ALT SERPL W P-5'-P-CCNC: 17 U/L (ref 1–41)
ANION GAP SERPL CALCULATED.3IONS-SCNC: 9.3 MMOL/L (ref 5–15)
AST SERPL-CCNC: 15 U/L (ref 1–40)
BASOPHILS # BLD AUTO: 0.08 10*3/MM3 (ref 0–0.2)
BASOPHILS NFR BLD AUTO: 0.7 % (ref 0–1.5)
BILIRUB SERPL-MCNC: 0.7 MG/DL (ref 0–1.2)
BUN SERPL-MCNC: 11 MG/DL (ref 8–23)
BUN/CREAT SERPL: 12.4 (ref 7–25)
CALCIUM SPEC-SCNC: 9.1 MG/DL (ref 8.6–10.5)
CHLORIDE SERPL-SCNC: 105 MMOL/L (ref 98–107)
CO2 SERPL-SCNC: 27.7 MMOL/L (ref 22–29)
CREAT SERPL-MCNC: 0.89 MG/DL (ref 0.76–1.27)
DEPRECATED RDW RBC AUTO: 41.6 FL (ref 37–54)
EOSINOPHIL # BLD AUTO: 0.14 10*3/MM3 (ref 0–0.4)
EOSINOPHIL NFR BLD AUTO: 1.2 % (ref 0.3–6.2)
ERYTHROCYTE [DISTWIDTH] IN BLOOD BY AUTOMATED COUNT: 12.2 % (ref 12.3–15.4)
GFR SERPL CREATININE-BSD FRML MDRD: 87 ML/MIN/1.73
GLOBULIN UR ELPH-MCNC: 2.4 GM/DL
GLUCOSE SERPL-MCNC: 96 MG/DL (ref 65–99)
HCT VFR BLD AUTO: 37.6 % (ref 37.5–51)
HGB BLD-MCNC: 12.7 G/DL (ref 13–17.7)
HOLD SPECIMEN: NORMAL
HOLD SPECIMEN: NORMAL
IMM GRANULOCYTES # BLD AUTO: 0.04 10*3/MM3 (ref 0–0.05)
IMM GRANULOCYTES NFR BLD AUTO: 0.3 % (ref 0–0.5)
LYMPHOCYTES # BLD AUTO: 2.6 10*3/MM3 (ref 0.7–3.1)
LYMPHOCYTES NFR BLD AUTO: 22.4 % (ref 19.6–45.3)
MCH RBC QN AUTO: 31.3 PG (ref 26.6–33)
MCHC RBC AUTO-ENTMCNC: 33.8 G/DL (ref 31.5–35.7)
MCV RBC AUTO: 92.6 FL (ref 79–97)
MONOCYTES # BLD AUTO: 1.21 10*3/MM3 (ref 0.1–0.9)
MONOCYTES NFR BLD AUTO: 10.4 % (ref 5–12)
NEUTROPHILS NFR BLD AUTO: 65 % (ref 42.7–76)
NEUTROPHILS NFR BLD AUTO: 7.56 10*3/MM3 (ref 1.7–7)
NRBC BLD AUTO-RTO: 0 /100 WBC (ref 0–0.2)
PLATELET # BLD AUTO: 175 10*3/MM3 (ref 140–450)
PMV BLD AUTO: 11.7 FL (ref 6–12)
POTASSIUM SERPL-SCNC: 4.1 MMOL/L (ref 3.5–5.2)
PROT SERPL-MCNC: 6.9 G/DL (ref 6–8.5)
RBC # BLD AUTO: 4.06 10*6/MM3 (ref 4.14–5.8)
SODIUM SERPL-SCNC: 142 MMOL/L (ref 136–145)
WBC # BLD AUTO: 11.63 10*3/MM3 (ref 3.4–10.8)
WHOLE BLOOD HOLD SPECIMEN: NORMAL
WHOLE BLOOD HOLD SPECIMEN: NORMAL

## 2021-01-19 PROCEDURE — 36415 COLL VENOUS BLD VENIPUNCTURE: CPT

## 2021-01-19 PROCEDURE — 80053 COMPREHEN METABOLIC PANEL: CPT

## 2021-01-19 PROCEDURE — 85025 COMPLETE CBC W/AUTO DIFF WBC: CPT

## 2021-01-19 RX ADMIN — SILVER NITRATE APPLICATORS 1 APPLICATION: 25; 75 STICK TOPICAL at 04:00

## 2021-01-19 RX ADMIN — PHENYLEPHRINE HYDROCHLORIDE 2 SPRAY: 0.5 SPRAY NASAL at 04:59

## 2021-01-19 NOTE — DISCHARGE INSTRUCTIONS
Should the bleeding recur take the Gallo-Synephrine provided and moistened to cotton balls.  Place them in both nares and apply the nasal clamp.  Leave this in place for 15 minutes and see if the bleeding stops.  You may repeat this process x2.  If bleeding should persist return to the ER.    Recommend calling and following up with Dr. Mims at the number above for further evaluation of the hole in the septum and recurrent nosebleeds.

## 2021-01-19 NOTE — ED PROVIDER NOTES
EMERGENCY DEPARTMENT ENCOUNTER    Room Number:  03/03  Date of encounter:  1/19/2021  PCP: Bruno Ricci MD  Historian: Patient      HPI:  Chief Complaint: Right-sided epistaxis  A complete HPI/ROS/PMH/PSH/SH/FH are unobtainable due to: Nothing    Context: Harvinder Vega is a 60 y.o. male who presents to the ED c/o right-sided epistaxis that began earlier this evening.  Patient states back in November he had a triple bypass placed on antiplatelet therapy.  Since that time he has had 3 different nosebleeds.  He states tonight by the time he arrived to the emergency department stopped but he is here for further evaluation.  He denies associated easy bruising, bleeding gums, lightheadedness, dizziness, chest pain, palpitations.  There are no other symptoms to report      PAST MEDICAL HISTORY  Active Ambulatory Problems     Diagnosis Date Noted   • Spinal stenosis of lumbar region 08/09/2017     Resolved Ambulatory Problems     Diagnosis Date Noted   • No Resolved Ambulatory Problems     Past Medical History:   Diagnosis Date   • Arthritis    • Claustrophobia    • Herniated intervertebral disc of lumbar spine    • Low back pain          PAST SURGICAL HISTORY  Past Surgical History:   Procedure Laterality Date   • GANGLION CYST EXCISION Right     ANKLE   • KNEE SURGERY Right 2013   • LUMBAR EPIDURAL INJECTION     • LUMBAR LAMINECTOMY DISCECTOMY DECOMPRESSION Left 8/31/2017    Procedure: Left L4-5, L5-S1 laminectomy;  Surgeon: Galindo Hernandes MD;  Location: [facility];  Service:    • ROTATOR CUFF REPAIR Right    • SHOULDER SURGERY Right 2013   • TONSILECTOMY, ADENOIDECTOMY, BILATERAL MYRINGOTOMY AND TUBES      1962         FAMILY HISTORY  Family History   Problem Relation Age of Onset   • Heart failure Mother    • Heart failure Father    • Malig Hyperthermia Neg Hx          SOCIAL HISTORY  Social History     Socioeconomic History   • Marital status:      Spouse name: Not on file   • Number of children: Not  on file   • Years of education: Not on file   • Highest education level: Not on file   Tobacco Use   • Smoking status: Current Every Day Smoker     Packs/day: 1.00     Years: 30.00     Pack years: 30.00   • Smokeless tobacco: Never Used   Substance and Sexual Activity   • Alcohol use: No   • Drug use: Defer   • Sexual activity: Defer         ALLERGIES  Patient has no known allergies.        REVIEW OF SYSTEMS  Review of Systems   Constitutional: Negative for chills and fever.   HENT: Positive for nosebleeds.    Respiratory: Negative.    Cardiovascular: Negative.    Gastrointestinal: Negative.    Genitourinary: Negative.    Musculoskeletal: Negative.    Skin: Negative.    Neurological: Negative for dizziness and light-headedness.   Hematological: Does not bruise/bleed easily.   Psychiatric/Behavioral: Negative.         All systems reviewed and negative except for those discussed in HPI.       PHYSICAL EXAM    I have reviewed the triage vital signs and nursing notes.    ED Triage Vitals   Temp Heart Rate Resp BP SpO2   01/18/21 2307 01/18/21 2307 01/18/21 2307 01/18/21 2309 01/18/21 2307   97.1 °F (36.2 °C) 89 18 (!) 189/102 96 %      Temp src Heart Rate Source Patient Position BP Location FiO2 (%)   01/18/21 2307 01/18/21 2307 01/18/21 2309 01/18/21 2309 --   Tympanic Monitor Standing Left arm        Physical Exam  GENERAL: WDWN male in no acute distress  HENT: nares patent there is a hole in the septum with a stable clot in place.  There is some very mild slow bleeding around the clot on the right side.  EYES: no scleral icterus  CV: regular rhythm, regular rate, no rubs murmurs gallops  RESPIRATORY: normal effort  ABDOMEN: soft  MUSCULOSKELETAL: no deformity  NEURO: alert oriented x4, moves all extremities, follows commands  SKIN: warm, dry        LAB RESULTS  Recent Results (from the past 24 hour(s))   Comprehensive Metabolic Panel    Collection Time: 01/19/21 12:12 AM    Specimen: Arm, Left; Blood   Result Value  Ref Range    Glucose 96 65 - 99 mg/dL    BUN 11 8 - 23 mg/dL    Creatinine 0.89 0.76 - 1.27 mg/dL    Sodium 142 136 - 145 mmol/L    Potassium 4.1 3.5 - 5.2 mmol/L    Chloride 105 98 - 107 mmol/L    CO2 27.7 22.0 - 29.0 mmol/L    Calcium 9.1 8.6 - 10.5 mg/dL    Total Protein 6.9 6.0 - 8.5 g/dL    Albumin 4.50 3.50 - 5.20 g/dL    ALT (SGPT) 17 1 - 41 U/L    AST (SGOT) 15 1 - 40 U/L    Alkaline Phosphatase 88 39 - 117 U/L    Total Bilirubin 0.7 0.0 - 1.2 mg/dL    eGFR Non African Amer 87 >60 mL/min/1.73    Globulin 2.4 gm/dL    A/G Ratio 1.9 g/dL    BUN/Creatinine Ratio 12.4 7.0 - 25.0    Anion Gap 9.3 5.0 - 15.0 mmol/L   CBC Auto Differential    Collection Time: 01/19/21 12:12 AM    Specimen: Arm, Left; Blood   Result Value Ref Range    WBC 11.63 (H) 3.40 - 10.80 10*3/mm3    RBC 4.06 (L) 4.14 - 5.80 10*6/mm3    Hemoglobin 12.7 (L) 13.0 - 17.7 g/dL    Hematocrit 37.6 37.5 - 51.0 %    MCV 92.6 79.0 - 97.0 fL    MCH 31.3 26.6 - 33.0 pg    MCHC 33.8 31.5 - 35.7 g/dL    RDW 12.2 (L) 12.3 - 15.4 %    RDW-SD 41.6 37.0 - 54.0 fl    MPV 11.7 6.0 - 12.0 fL    Platelets 175 140 - 450 10*3/mm3    Neutrophil % 65.0 42.7 - 76.0 %    Lymphocyte % 22.4 19.6 - 45.3 %    Monocyte % 10.4 5.0 - 12.0 %    Eosinophil % 1.2 0.3 - 6.2 %    Basophil % 0.7 0.0 - 1.5 %    Immature Grans % 0.3 0.0 - 0.5 %    Neutrophils, Absolute 7.56 (H) 1.70 - 7.00 10*3/mm3    Lymphocytes, Absolute 2.60 0.70 - 3.10 10*3/mm3    Monocytes, Absolute 1.21 (H) 0.10 - 0.90 10*3/mm3    Eosinophils, Absolute 0.14 0.00 - 0.40 10*3/mm3    Basophils, Absolute 0.08 0.00 - 0.20 10*3/mm3    Immature Grans, Absolute 0.04 0.00 - 0.05 10*3/mm3    nRBC 0.0 0.0 - 0.2 /100 WBC   Light Blue Top    Collection Time: 01/19/21 12:12 AM   Result Value Ref Range    Extra Tube hold for add-on    Green Top (Gel)    Collection Time: 01/19/21 12:12 AM   Result Value Ref Range    Extra Tube Hold for add-ons.    Lavender Top    Collection Time: 01/19/21 12:12 AM   Result Value Ref Range     Extra Tube hold for add-on    Gold Top - SST    Collection Time: 01/19/21 12:12 AM   Result Value Ref Range    Extra Tube Hold for add-ons.        Ordered the above labs and independently reviewed the results.        RADIOLOGY  No Radiology Exams Resulted Within Past 24 Hours    I ordered the above noted radiological studies. Reviewed by me and discussed with radiologist.  See dictation for official radiology interpretation.      PROCEDURES    Epistaxis Management    Date/Time: 1/19/2021 4:50 AM  Performed by: Valdemar Hammond III, PA  Authorized by: Randolph Markham MD     Consent:     Consent obtained:  Verbal    Consent given by:  Patient    Risks discussed:  Bleeding  Anesthesia (see MAR for exact dosages):     Anesthesia method:  Topical application    Topical anesthesia: Lidocaine with epinephrine.  Procedure details:     Treatment site:  R anterior    Treatment method:  Silver nitrate    Treatment complexity:  Limited    Treatment episode: initial    Post-procedure details:     Assessment:  Bleeding stopped    Patient tolerance of procedure:  Tolerated well, no immediate complications          MEDICATIONS GIVEN IN ER    Medications   phenylephrine (OG-SYNEPHRINE) 0.5 % nasal spray 2 spray (has no administration in time range)   silver nitrate 75-25 % applicator 1 application (1 application Topical Given by Other 1/19/21 0400)   silver nitrate 75-25 % applicator 1 application (1 application Topical Given by Other 1/19/21 0400)         PROGRESS, DATA ANALYSIS, CONSULTS, AND MEDICAL DECISION MAKING    All labs have been independently reviewed by me.  All radiology studies have been reviewed by me and discussed with radiologist dictating the report.   EKG's independently viewed and interpreted by me.  Discussion below represents my analysis of pertinent findings related to patient's condition, differential diagnosis, treatment plan and final disposition.             Patient was placed in face mask in  first look. Patient was wearing facemask when I entered the room and throughout our encounter. I wore full protective equipment throughout this patient encounter including a face mask, eye shield, gown and gloves. Hand hygiene was performed before donning protective equipment and after removal when leaving the room.    AS OF 04:53 EST VITALS:    BP - 163/98  HR - 69  TEMP - 97.1 °F (36.2 °C) (Tympanic)  O2 SATS - 95%        DIAGNOSIS  Final diagnoses:   Right-sided epistaxis         DISPOSITION  DISCHARGE    Patient discharged in stable condition.    Reviewed implications of results, diagnosis, meds, responsibility to follow up, warning signs and symptoms of possible worsening, potential complications and reasons to return to ER.    Patient/Family voiced understanding of above instructions.    Discussed plan for discharge, as there is no emergent indication for admission. Patient referred to primary care provider for BP management due to today's BP. Pt/family is agreeable and understands need for follow up and repeat testing.  Pt is aware that discharge does not mean that nothing is wrong but it indicates no emergency is present that requires admission and they must continue care with follow-up as given below or physician of their choice.     FOLLOW-UP  Deric Mims MD  [address] [address]  [phone]    Schedule an appointment as soon as possible for a visit   For further evaluation and treatment         Medication List      No changes were made to your prescriptions during this visit.                Valdemar Hammond III, PA  01/19/21 6875

## 2021-01-19 NOTE — ED TRIAGE NOTES
"Pt to ED from home via PV c/o nosebleed which began approx 2100 tonight. Bleeding currently controlled by tissues which pt has placed inside the nare. Pt is on plavix and daily asa. Reports hx MI, had CABG in 12/2020. Pt reports he was \"sitting in a chair watching TV\" when the bleeding began. Pt alert, answering questions appropriately, ambulating unassisted in triage. Pt masked, triage staff wearing appropriate PPE.  "

## 2021-01-19 NOTE — ED NOTES
Pt provided discharge and follow up instructions at this time.  Medications reviewed.  Questions answered.  Pt verbalized understanding of all discharge information. Pt vitals stable, no obvious distress noted.     Shasta Dow, RN  01/19/21 8025

## 2021-01-19 NOTE — ED PROVIDER NOTES
The SHAVONNE and I have discussed this patients history, physical exam, and treatment plan. I have reviewed the documentation and personally had a face to face interaction with the patient. I affirm the documentation and agree with the treatment and plan.  The following note describes my personal findings    This patient is a 60-year-old male presenting to the emergency room secondary to right-sided epistaxis that has been ongoing for several hours tonight.  The patient is on home Plavix therapy.    Exam: This patient is resting comfortably and in no distress, without gross neurological deficit.  He is afebrile with stable vital signs and nontoxic in appearance.  Examination of the nostril does show clotted blood to the anterior septum bilaterally.  There is no active bleeding.    Plan: Cautery was applied with silver nitrate to the affected areas along Kiesselbach's plexus.  We will monitor the patient in the emergency room to ensure no further bleeding.      The patient was wearing a facemask upon entrance into the room and remained in such throughout their visit.  I was wearing PPE including a facemask, eye protection, as well as gloves at any point entering the room and throughout the visit     Randolph Markham MD  01/19/21 0380

## 2021-01-20 ENCOUNTER — TREATMENT (OUTPATIENT)
Dept: CARDIAC REHAB | Facility: HOSPITAL | Age: 61
End: 2021-01-20

## 2021-01-20 DIAGNOSIS — I21.4 NON-STEMI (NON-ST ELEVATED MYOCARDIAL INFARCTION) (HCC): Primary | ICD-10-CM

## 2021-01-20 PROCEDURE — 93798 PHYS/QHP OP CAR RHAB W/ECG: CPT

## 2021-01-22 ENCOUNTER — TREATMENT (OUTPATIENT)
Dept: CARDIAC REHAB | Facility: HOSPITAL | Age: 61
End: 2021-01-22

## 2021-01-22 DIAGNOSIS — Z95.1 S/P CABG X 4: ICD-10-CM

## 2021-01-22 DIAGNOSIS — I21.4 NON-STEMI (NON-ST ELEVATED MYOCARDIAL INFARCTION) (HCC): Primary | ICD-10-CM

## 2021-01-22 PROCEDURE — 93798 PHYS/QHP OP CAR RHAB W/ECG: CPT

## 2021-01-25 ENCOUNTER — TREATMENT (OUTPATIENT)
Dept: CARDIAC REHAB | Facility: HOSPITAL | Age: 61
End: 2021-01-25

## 2021-01-25 DIAGNOSIS — I21.4 NON-STEMI (NON-ST ELEVATED MYOCARDIAL INFARCTION) (HCC): Primary | ICD-10-CM

## 2021-01-25 PROCEDURE — 93798 PHYS/QHP OP CAR RHAB W/ECG: CPT

## 2021-01-27 ENCOUNTER — OFFICE VISIT (OUTPATIENT)
Dept: CARDIOLOGY | Facility: CLINIC | Age: 61
End: 2021-01-27

## 2021-01-27 VITALS
HEIGHT: 72 IN | WEIGHT: 236 LBS | BODY MASS INDEX: 31.97 KG/M2 | DIASTOLIC BLOOD PRESSURE: 99 MMHG | OXYGEN SATURATION: 99 % | SYSTOLIC BLOOD PRESSURE: 152 MMHG | HEART RATE: 76 BPM

## 2021-01-27 DIAGNOSIS — I21.4 ACUTE NON-ST ELEVATION MYOCARDIAL INFARCTION (NSTEMI) (HCC): ICD-10-CM

## 2021-01-27 DIAGNOSIS — E66.3 OVER WEIGHT: ICD-10-CM

## 2021-01-27 DIAGNOSIS — E78.2 MIXED HYPERLIPIDEMIA: ICD-10-CM

## 2021-01-27 DIAGNOSIS — I25.810 CORONARY ARTERY DISEASE INVOLVING CORONARY BYPASS GRAFT OF NATIVE HEART WITHOUT ANGINA PECTORIS: Primary | ICD-10-CM

## 2021-01-27 PROBLEM — I21.3 ACUTE ST ELEVATION MYOCARDIAL INFARCTION (STEMI): Status: ACTIVE | Noted: 2021-01-27

## 2021-01-27 PROCEDURE — 99204 OFFICE O/P NEW MOD 45 MIN: CPT | Performed by: INTERNAL MEDICINE

## 2021-01-27 PROCEDURE — 93000 ELECTROCARDIOGRAM COMPLETE: CPT | Performed by: INTERNAL MEDICINE

## 2021-01-27 RX ORDER — CARVEDILOL 12.5 MG/1
6.25 TABLET ORAL 2 TIMES DAILY
COMMUNITY
Start: 2020-11-24 | End: 2021-04-06 | Stop reason: HOSPADM

## 2021-01-27 RX ORDER — CLOPIDOGREL BISULFATE 75 MG/1
75 TABLET ORAL DAILY
COMMUNITY
Start: 2020-11-24 | End: 2022-08-01 | Stop reason: SDUPTHER

## 2021-01-27 RX ORDER — ATORVASTATIN CALCIUM 80 MG/1
1 TABLET, FILM COATED ORAL DAILY
COMMUNITY
Start: 2020-11-24 | End: 2022-08-01 | Stop reason: SDUPTHER

## 2021-01-27 RX ORDER — PREGABALIN 150 MG/1
1 CAPSULE ORAL 2 TIMES DAILY
COMMUNITY
Start: 2021-01-10

## 2021-01-27 NOTE — PROGRESS NOTES
Date of Consultation: 2021    Patient Name: Harvinder Vega  :1960    Encounter Provider:James East MD  Primary Care Provider: Bruno Ricci MD    Place of Service: [facility] CARDIOLOGY    Chief Complaint   Patient presents with   • Hospital Follow Up Visit     NP: fatigue, SOA   • Coronary Artery Disease   • Cardiomyopathy       History of Present Illness    The patient is a 60-year-old white male who enters the office today for cardiac evaluation.  On  the patient experienced a non-ST elevation myocardial infarction was complicated by cardiogenic shock.  He was found to have an occluded right coronary artery with collateralization but diffuse three-vessel coronary disease and left ventricular dysfunction with an ejection fraction of approximately 30 to 35%.  He underwent three-vessel grafting with an internal mammary graft to the LAD.  In individual saphenous vein grafts to the diagonal branch of the LAD, obtuse marginal branch of the circumflex and posterior descending coronary artery of the right coronary artery.    The patient has been enrolled in cardiac rehabilitation.  He states he is doing fairly well without any significant issues associated with his infarct.  He does not complain of orthopnea nor paroxysmal nocturnal dyspnea.  He has no complaints of lower extremity swelling.    In addition to his coronary disease he was placed on statin therapy for cholesterol that was not at targets.  His total cholesterol was 179 with triglycerides of 97.  His LDL was 122 with an HDL of 38.  Hemoglobin A1c at the time of his admission was 5.1.    Prior to the development of coronary disease the patient was only being treated for back pain.  He has not had any major prior medical history.  His risk factors for coronary disease include a long smoking history as well as a family history.  The patient has since discontinued all tobacco.  Past Medical  History:   Diagnosis Date   • Arthritis    • Claustrophobia    • Coronary artery disease    • Herniated intervertebral disc of lumbar spine    • Hyperlipidemia    • Low back pain          Past Surgical History:   Procedure Laterality Date   • CORONARY ARTERY BYPASS GRAFT     • GANGLION CYST EXCISION Right     ANKLE   • KNEE SURGERY Right 2013   • LUMBAR EPIDURAL INJECTION     • LUMBAR LAMINECTOMY DISCECTOMY DECOMPRESSION Left 8/31/2017    Procedure: Left L4-5, L5-S1 laminectomy;  Surgeon: Galindo Hernandes MD;  Location: [facility];  Service:    • ROTATOR CUFF REPAIR Right    • SHOULDER SURGERY Right 2013   • TONSILECTOMY, ADENOIDECTOMY, BILATERAL MYRINGOTOMY AND TUBES      1962           Current Outpatient Medications:   •  atorvastatin (LIPITOR) 80 MG tablet, Take 1 tablet by mouth Daily., Disp: , Rfl:   •  carvedilol (COREG) 12.5 MG tablet, Take 0.05 tablets by mouth 2 (two) times a day., Disp: , Rfl:   •  clopidogrel (PLAVIX) 75 MG tablet, Take 75 mg by mouth Daily., Disp: , Rfl:   •  LYRICA 75 MG capsule, TK ONE C PO  BID, Disp: , Rfl: 0  •  pregabalin (LYRICA) 150 MG capsule, Take 1 capsule by mouth 2 (Two) Times a Day., Disp: , Rfl:   •  etodolac (LODINE) 500 MG tablet, Take 1 tablet by mouth 2 (Two) Times a Day., Disp: 60 tablet, Rfl: 4  •  gabapentin (NEURONTIN) 300 MG capsule, TAKE 1 CAPSULE BY MOUTH TWICE DAILY, Disp: 60 capsule, Rfl: 0  •  nabumetone (RELAFEN) 500 MG tablet, Take 500 mg by mouth 2 (Two) Times a Day., Disp: , Rfl: 5  •  naproxen sodium (ALEVE) 220 MG tablet, Take 220 mg by mouth As Needed for mild pain (1-3)., Disp: , Rfl:       Social History     Socioeconomic History   • Marital status:      Spouse name: Not on file   • Number of children: Not on file   • Years of education: Not on file   • Highest education level: Not on file   Tobacco Use   • Smoking status: Former Smoker     Packs/day: 1.50     Years: 30.00     Pack years: 45.00     Types: Cigarettes     Quit date:  "2020     Years since quittin.2   • Smokeless tobacco: Never Used   Substance and Sexual Activity   • Alcohol use: Not Currently   • Drug use: Defer   • Sexual activity: Yes     Partners: Female         Review of Systems   Constitution: Positive for malaise/fatigue.   HENT: Negative.    Eyes: Negative.    Cardiovascular: Positive for dyspnea on exertion.   Respiratory: Negative.    Endocrine: Negative.    Skin: Negative.    Musculoskeletal: Negative.    Gastrointestinal: Negative.    Neurological: Negative.    Psychiatric/Behavioral: Negative.        Procedures      ECG 12 Lead    Date/Time: 2021 10:32 AM  Performed by: James East MD  Authorized by: James East MD   Comparison: not compared with previous ECG   Rhythm: sinus rhythm  Rate: normal  Conduction: right bundle branch block  QRS axis: normal                  Objective:    /99   Pulse 76   Ht 182.9 cm (72\")   Wt 107 kg (236 lb)   SpO2 99%   BMI 32.01 kg/m²         Vitals signs reviewed.   Constitutional:       Appearance: Well-developed and overweight.   Eyes:      Pupils: Pupils are equal, round, and reactive to light.   HENT:      Head: Normocephalic.   Neck:      Musculoskeletal: Normal range of motion.      Thyroid: No thyromegaly.      Vascular: No carotid bruit or JVD.   Pulmonary:      Effort: Pulmonary effort is normal.      Breath sounds: Normal breath sounds.   Cardiovascular:      Normal rate. Regular rhythm.      No gallop.   Pulses:     Intact distal pulses.   Edema:     Peripheral edema absent.   Abdominal:      General: Bowel sounds are normal.      Palpations: Abdomen is soft.   Skin:     General: Skin is warm and dry.      Findings: No erythema.   Neurological:      Mental Status: Alert and oriented to person, place, and time.             Assessment:       Diagnosis Plan   1. Coronary artery disease involving coronary bypass graft of native heart without angina pectoris  Adult Transthoracic Echo " Complete w/ Color, Spectral and Contrast if Necessary Per Protocol   2. Mixed hyperlipidemia     3. Over weight     4. Acute non-ST elevation myocardial infarction (NSTEMI) (CMS/HCC)       1. Coronary artery disease: Status post non-ST FIDELINA, status post CABG November 2020.  2.  Ischemic cardiomyopathy: Left ventricular ejection fraction 30 to 35%.  No signs of heart failure at present  3.  Right bundle branch block: Uncertain duration or if it is new.  We will try to find old electrocardiograms  4.  Hyperlipidemia: On statin therapy.  Will need surveillance labs in the future  5.  Overweight: Discussed dietary measures  6.  History of tobacco abuse: None since surgery.  Continued support for cessation         Plan:       1.  The patient is continuing a cardiac rehabilitation  2.  Follow-up in 4 months  3.  Echocardiogram after next visit.    I appreciate the opportunity to see this patient in consultation

## 2021-01-29 ENCOUNTER — TREATMENT (OUTPATIENT)
Dept: CARDIAC REHAB | Facility: HOSPITAL | Age: 61
End: 2021-01-29

## 2021-01-29 DIAGNOSIS — I21.4 NON-STEMI (NON-ST ELEVATED MYOCARDIAL INFARCTION) (HCC): Primary | ICD-10-CM

## 2021-01-29 PROCEDURE — 93798 PHYS/QHP OP CAR RHAB W/ECG: CPT

## 2021-02-03 ENCOUNTER — TREATMENT (OUTPATIENT)
Dept: CARDIAC REHAB | Facility: HOSPITAL | Age: 61
End: 2021-02-03

## 2021-02-03 DIAGNOSIS — Z95.1 S/P CABG X 4: ICD-10-CM

## 2021-02-03 DIAGNOSIS — I21.4 NON-STEMI (NON-ST ELEVATED MYOCARDIAL INFARCTION) (HCC): Primary | ICD-10-CM

## 2021-02-03 PROCEDURE — 93798 PHYS/QHP OP CAR RHAB W/ECG: CPT

## 2021-02-05 ENCOUNTER — TREATMENT (OUTPATIENT)
Dept: CARDIAC REHAB | Facility: HOSPITAL | Age: 61
End: 2021-02-05

## 2021-02-05 DIAGNOSIS — I21.4 NON-STEMI (NON-ST ELEVATED MYOCARDIAL INFARCTION) (HCC): Primary | ICD-10-CM

## 2021-02-05 PROCEDURE — 93798 PHYS/QHP OP CAR RHAB W/ECG: CPT

## 2021-02-08 ENCOUNTER — TREATMENT (OUTPATIENT)
Dept: CARDIAC REHAB | Facility: HOSPITAL | Age: 61
End: 2021-02-08

## 2021-02-08 DIAGNOSIS — I21.4 NON-STEMI (NON-ST ELEVATED MYOCARDIAL INFARCTION) (HCC): Primary | ICD-10-CM

## 2021-02-08 PROCEDURE — 93798 PHYS/QHP OP CAR RHAB W/ECG: CPT

## 2021-02-09 ENCOUNTER — TELEPHONE (OUTPATIENT)
Dept: CARDIOLOGY | Facility: CLINIC | Age: 61
End: 2021-02-09

## 2021-02-09 ENCOUNTER — HOSPITAL ENCOUNTER (OUTPATIENT)
Dept: CARDIOLOGY | Facility: HOSPITAL | Age: 61
Discharge: HOME | End: 2021-02-09
Admitting: INTERNAL MEDICINE

## 2021-02-09 VITALS
HEIGHT: 72 IN | WEIGHT: 236 LBS | BODY MASS INDEX: 31.97 KG/M2 | DIASTOLIC BLOOD PRESSURE: 80 MMHG | SYSTOLIC BLOOD PRESSURE: 140 MMHG

## 2021-02-09 DIAGNOSIS — I25.810 CORONARY ARTERY DISEASE INVOLVING CORONARY BYPASS GRAFT OF NATIVE HEART WITHOUT ANGINA PECTORIS: ICD-10-CM

## 2021-02-09 PROCEDURE — 25010000002 PERFLUTREN (DEFINITY) 8.476 MG IN SODIUM CHLORIDE (PF) 0.9 % 10 ML INJECTION: Performed by: INTERNAL MEDICINE

## 2021-02-09 PROCEDURE — 93306 TTE W/DOPPLER COMPLETE: CPT

## 2021-02-09 PROCEDURE — 93306 TTE W/DOPPLER COMPLETE: CPT | Performed by: INTERNAL MEDICINE

## 2021-02-09 RX ADMIN — PERFLUTREN 1.5 ML: 6.52 INJECTION, SUSPENSION INTRAVENOUS at 08:03

## 2021-02-10 ENCOUNTER — TREATMENT (OUTPATIENT)
Dept: CARDIAC REHAB | Facility: HOSPITAL | Age: 61
End: 2021-02-10

## 2021-02-10 DIAGNOSIS — I21.4 NON-STEMI (NON-ST ELEVATED MYOCARDIAL INFARCTION) (HCC): Primary | ICD-10-CM

## 2021-02-10 LAB
AORTIC ARCH: 2.4 CM
ASCENDING AORTA: 3.5 CM
BH CV ECHO MEAS - ACS: 2.4 CM
BH CV ECHO MEAS - AI MAX PG: 63.4 MMHG
BH CV ECHO MEAS - AI MAX VEL: 398.2 CM/SEC
BH CV ECHO MEAS - AO ARCH DIAM (PROXIMAL TRANS.): 2.4 CM
BH CV ECHO MEAS - AO MAX PG (FULL): 2.8 MMHG
BH CV ECHO MEAS - AO MAX PG: 9 MMHG
BH CV ECHO MEAS - AO MEAN PG (FULL): 1.1 MMHG
BH CV ECHO MEAS - AO MEAN PG: 4.6 MMHG
BH CV ECHO MEAS - AO ROOT AREA (BSA CORRECTED): 1.8
BH CV ECHO MEAS - AO ROOT AREA: 13.2 CM^2
BH CV ECHO MEAS - AO ROOT DIAM: 4.1 CM
BH CV ECHO MEAS - AO V2 MAX: 150.3 CM/SEC
BH CV ECHO MEAS - AO V2 MEAN: 100.7 CM/SEC
BH CV ECHO MEAS - AO V2 VTI: 29.1 CM
BH CV ECHO MEAS - ASC AORTA: 3.5 CM
BH CV ECHO MEAS - AVA(I,A): 2.5 CM^2
BH CV ECHO MEAS - AVA(I,D): 2.5 CM^2
BH CV ECHO MEAS - AVA(V,A): 2.5 CM^2
BH CV ECHO MEAS - AVA(V,D): 2.5 CM^2
BH CV ECHO MEAS - BSA(HAYCOCK): 2.4 M^2
BH CV ECHO MEAS - BSA: 2.3 M^2
BH CV ECHO MEAS - BZI_BMI: 32 KILOGRAMS/M^2
BH CV ECHO MEAS - BZI_METRIC_HEIGHT: 182.9 CM
BH CV ECHO MEAS - BZI_METRIC_WEIGHT: 107.1 KG
BH CV ECHO MEAS - EDV(MOD-SP2): 206 ML
BH CV ECHO MEAS - EDV(MOD-SP4): 183 ML
BH CV ECHO MEAS - EDV(TEICH): 150.9 ML
BH CV ECHO MEAS - EF(CUBED): 65.9 %
BH CV ECHO MEAS - EF(MOD-BP): 63 %
BH CV ECHO MEAS - EF(MOD-SP2): 67 %
BH CV ECHO MEAS - EF(MOD-SP4): 59 %
BH CV ECHO MEAS - EF(TEICH): 56.8 %
BH CV ECHO MEAS - ESV(MOD-SP2): 68 ML
BH CV ECHO MEAS - ESV(MOD-SP4): 75 ML
BH CV ECHO MEAS - ESV(TEICH): 65.1 ML
BH CV ECHO MEAS - FS: 30.1 %
BH CV ECHO MEAS - IVS/LVPW: 1.1
BH CV ECHO MEAS - IVSD: 1.2 CM
BH CV ECHO MEAS - LAT PEAK E' VEL: 12.3 CM/SEC
BH CV ECHO MEAS - LV DIASTOLIC VOL/BSA (35-75): 80.1 ML/M^2
BH CV ECHO MEAS - LV MASS(C)D: 266.6 GRAMS
BH CV ECHO MEAS - LV MASS(C)DI: 116.6 GRAMS/M^2
BH CV ECHO MEAS - LV MAX PG: 6.3 MMHG
BH CV ECHO MEAS - LV MEAN PG: 3.5 MMHG
BH CV ECHO MEAS - LV SYSTOLIC VOL/BSA (12-30): 32.8 ML/M^2
BH CV ECHO MEAS - LV V1 MAX: 125.1 CM/SEC
BH CV ECHO MEAS - LV V1 MEAN: 89.6 CM/SEC
BH CV ECHO MEAS - LV V1 VTI: 24.9 CM
BH CV ECHO MEAS - LVIDD: 5.6 CM
BH CV ECHO MEAS - LVIDS: 3.9 CM
BH CV ECHO MEAS - LVLD AP2: 9.1 CM
BH CV ECHO MEAS - LVLD AP4: 8.3 CM
BH CV ECHO MEAS - LVLS AP2: 8.2 CM
BH CV ECHO MEAS - LVLS AP4: 6.5 CM
BH CV ECHO MEAS - LVOT AREA (M): 2.8 CM^2
BH CV ECHO MEAS - LVOT AREA: 3 CM^2
BH CV ECHO MEAS - LVOT DIAM: 1.9 CM
BH CV ECHO MEAS - LVPWD: 1.1 CM
BH CV ECHO MEAS - MED PEAK E' VEL: 7.9 CM/SEC
BH CV ECHO MEAS - MR MAX PG: 135.1 MMHG
BH CV ECHO MEAS - MR MAX VEL: 581.1 CM/SEC
BH CV ECHO MEAS - MV A DUR: 0.13 SEC
BH CV ECHO MEAS - MV A MAX VEL: 39 CM/SEC
BH CV ECHO MEAS - MV DEC SLOPE: 519.8 CM/SEC^2
BH CV ECHO MEAS - MV DEC TIME: 0.21 SEC
BH CV ECHO MEAS - MV E MAX VEL: 117.4 CM/SEC
BH CV ECHO MEAS - MV E/A: 3
BH CV ECHO MEAS - MV MAX PG: 6.2 MMHG
BH CV ECHO MEAS - MV MEAN PG: 2 MMHG
BH CV ECHO MEAS - MV P1/2T MAX VEL: 124.2 CM/SEC
BH CV ECHO MEAS - MV P1/2T: 70 MSEC
BH CV ECHO MEAS - MV V2 MAX: 124.9 CM/SEC
BH CV ECHO MEAS - MV V2 MEAN: 62.5 CM/SEC
BH CV ECHO MEAS - MV V2 VTI: 38.8 CM
BH CV ECHO MEAS - MVA P1/2T LCG: 1.8 CM^2
BH CV ECHO MEAS - MVA(P1/2T): 3.1 CM^2
BH CV ECHO MEAS - MVA(VTI): 1.9 CM^2
BH CV ECHO MEAS - PA ACC TIME: 0.14 SEC
BH CV ECHO MEAS - PA MAX PG (FULL): 1.8 MMHG
BH CV ECHO MEAS - PA MAX PG: 3.3 MMHG
BH CV ECHO MEAS - PA PR(ACCEL): 17.2 MMHG
BH CV ECHO MEAS - PA V2 MAX: 90.9 CM/SEC
BH CV ECHO MEAS - PULM A REVS DUR: 0.16 SEC
BH CV ECHO MEAS - PULM A REVS VEL: 44.1 CM/SEC
BH CV ECHO MEAS - PULM DIAS VEL: 126.2 CM/SEC
BH CV ECHO MEAS - PULM S/D: 0.42
BH CV ECHO MEAS - PULM SYS VEL: 53.3 CM/SEC
BH CV ECHO MEAS - PVA(V,A): 1.9 CM^2
BH CV ECHO MEAS - PVA(V,D): 1.9 CM^2
BH CV ECHO MEAS - QP/QS: 0.51
BH CV ECHO MEAS - RAP SYSTOLE: 3 MMHG
BH CV ECHO MEAS - RV MAX PG: 1.5 MMHG
BH CV ECHO MEAS - RV MEAN PG: 1 MMHG
BH CV ECHO MEAS - RV V1 MAX: 61.7 CM/SEC
BH CV ECHO MEAS - RV V1 MEAN: 48.1 CM/SEC
BH CV ECHO MEAS - RV V1 VTI: 13.5 CM
BH CV ECHO MEAS - RVOT AREA: 2.8 CM^2
BH CV ECHO MEAS - RVOT DIAM: 1.9 CM
BH CV ECHO MEAS - SI(AO): 167.6 ML/M^2
BH CV ECHO MEAS - SI(CUBED): 49.4 ML/M^2
BH CV ECHO MEAS - SI(LVOT): 32.4 ML/M^2
BH CV ECHO MEAS - SI(MOD-SP2): 60.4 ML/M^2
BH CV ECHO MEAS - SI(MOD-SP4): 47.3 ML/M^2
BH CV ECHO MEAS - SI(TEICH): 37.5 ML/M^2
BH CV ECHO MEAS - SUP REN AO DIAM: 2.4 CM
BH CV ECHO MEAS - SV(AO): 383.1 ML
BH CV ECHO MEAS - SV(CUBED): 113 ML
BH CV ECHO MEAS - SV(LVOT): 74.1 ML
BH CV ECHO MEAS - SV(MOD-SP2): 138 ML
BH CV ECHO MEAS - SV(MOD-SP4): 108 ML
BH CV ECHO MEAS - SV(RVOT): 37.4 ML
BH CV ECHO MEAS - SV(TEICH): 85.7 ML
BH CV ECHO MEAS - TAPSE (>1.6): 1.6 CM
BH CV ECHO MEAS - TR MAX VEL: 251.5 CM/SEC
BH CV ECHO MEASUREMENTS AVERAGE E/E' RATIO: 11.62
BH CV XLRA - RV BASE: 3.9 CM
BH CV XLRA - RV LENGTH: 7.2 CM
BH CV XLRA - RV MID: 2.3 CM
BH CV XLRA - TDI S': 11.5 CM/SEC
LEFT ATRIUM VOLUME INDEX: 52 ML/M2
LV EF 2D ECHO EST: 63 %
MAXIMAL PREDICTED HEART RATE: 158 BPM
SINUS: 4.1 CM
STJ: 3.7 CM
STRESS TARGET HR: 134 BPM

## 2021-02-10 PROCEDURE — 93798 PHYS/QHP OP CAR RHAB W/ECG: CPT

## 2021-02-12 ENCOUNTER — TREATMENT (OUTPATIENT)
Dept: CARDIAC REHAB | Facility: HOSPITAL | Age: 61
End: 2021-02-12

## 2021-02-12 DIAGNOSIS — I21.4 NON-STEMI (NON-ST ELEVATED MYOCARDIAL INFARCTION) (HCC): Primary | ICD-10-CM

## 2021-02-12 DIAGNOSIS — Z95.1 S/P CABG X 4: ICD-10-CM

## 2021-02-12 PROCEDURE — 93798 PHYS/QHP OP CAR RHAB W/ECG: CPT

## 2021-02-17 ENCOUNTER — TREATMENT (OUTPATIENT)
Dept: CARDIAC REHAB | Facility: HOSPITAL | Age: 61
End: 2021-02-17

## 2021-02-17 DIAGNOSIS — Z95.1 S/P CABG X 4: ICD-10-CM

## 2021-02-17 DIAGNOSIS — I21.4 NON-STEMI (NON-ST ELEVATED MYOCARDIAL INFARCTION) (HCC): Primary | ICD-10-CM

## 2021-02-17 PROCEDURE — 93798 PHYS/QHP OP CAR RHAB W/ECG: CPT

## 2021-02-19 ENCOUNTER — TREATMENT (OUTPATIENT)
Dept: CARDIAC REHAB | Facility: HOSPITAL | Age: 61
End: 2021-02-19

## 2021-02-19 DIAGNOSIS — I21.4 NON-STEMI (NON-ST ELEVATED MYOCARDIAL INFARCTION) (HCC): Primary | ICD-10-CM

## 2021-02-19 DIAGNOSIS — Z95.1 S/P CABG X 4: ICD-10-CM

## 2021-02-19 PROCEDURE — 93798 PHYS/QHP OP CAR RHAB W/ECG: CPT

## 2021-02-22 ENCOUNTER — TREATMENT (OUTPATIENT)
Dept: CARDIAC REHAB | Facility: HOSPITAL | Age: 61
End: 2021-02-22

## 2021-02-22 DIAGNOSIS — I21.4 NON-STEMI (NON-ST ELEVATED MYOCARDIAL INFARCTION) (HCC): Primary | ICD-10-CM

## 2021-02-22 PROCEDURE — 93798 PHYS/QHP OP CAR RHAB W/ECG: CPT

## 2021-03-01 ENCOUNTER — TREATMENT (OUTPATIENT)
Dept: CARDIAC REHAB | Facility: HOSPITAL | Age: 61
End: 2021-03-01

## 2021-03-01 DIAGNOSIS — I21.4 NON-STEMI (NON-ST ELEVATED MYOCARDIAL INFARCTION) (HCC): Primary | ICD-10-CM

## 2021-03-01 PROCEDURE — 93798 PHYS/QHP OP CAR RHAB W/ECG: CPT

## 2021-03-05 ENCOUNTER — TREATMENT (OUTPATIENT)
Dept: CARDIAC REHAB | Facility: HOSPITAL | Age: 61
End: 2021-03-05

## 2021-03-05 DIAGNOSIS — I21.4 NON-STEMI (NON-ST ELEVATED MYOCARDIAL INFARCTION) (HCC): Primary | ICD-10-CM

## 2021-03-05 PROCEDURE — 93798 PHYS/QHP OP CAR RHAB W/ECG: CPT

## 2021-03-10 ENCOUNTER — TREATMENT (OUTPATIENT)
Dept: CARDIAC REHAB | Facility: HOSPITAL | Age: 61
End: 2021-03-10

## 2021-03-10 DIAGNOSIS — I21.4 NON-STEMI (NON-ST ELEVATED MYOCARDIAL INFARCTION) (HCC): Primary | ICD-10-CM

## 2021-03-10 PROCEDURE — 93798 PHYS/QHP OP CAR RHAB W/ECG: CPT

## 2021-03-17 ENCOUNTER — TREATMENT (OUTPATIENT)
Dept: CARDIAC REHAB | Facility: HOSPITAL | Age: 61
End: 2021-03-17

## 2021-03-17 DIAGNOSIS — I21.4 NON-STEMI (NON-ST ELEVATED MYOCARDIAL INFARCTION) (HCC): Primary | ICD-10-CM

## 2021-03-17 PROCEDURE — 93798 PHYS/QHP OP CAR RHAB W/ECG: CPT

## 2021-03-19 ENCOUNTER — TREATMENT (OUTPATIENT)
Dept: CARDIAC REHAB | Facility: HOSPITAL | Age: 61
End: 2021-03-19

## 2021-03-19 DIAGNOSIS — I21.4 NON-STEMI (NON-ST ELEVATED MYOCARDIAL INFARCTION) (HCC): Primary | ICD-10-CM

## 2021-03-19 PROCEDURE — 93798 PHYS/QHP OP CAR RHAB W/ECG: CPT

## 2021-03-22 ENCOUNTER — TREATMENT (OUTPATIENT)
Dept: CARDIAC REHAB | Facility: HOSPITAL | Age: 61
End: 2021-03-22

## 2021-03-22 DIAGNOSIS — I21.4 NON-STEMI (NON-ST ELEVATED MYOCARDIAL INFARCTION) (HCC): Primary | ICD-10-CM

## 2021-03-22 PROCEDURE — 93798 PHYS/QHP OP CAR RHAB W/ECG: CPT

## 2021-03-24 ENCOUNTER — TREATMENT (OUTPATIENT)
Dept: CARDIAC REHAB | Facility: HOSPITAL | Age: 61
End: 2021-03-24

## 2021-03-24 DIAGNOSIS — Z95.1 S/P CABG X 4: ICD-10-CM

## 2021-03-24 DIAGNOSIS — I21.4 NON-STEMI (NON-ST ELEVATED MYOCARDIAL INFARCTION) (HCC): Primary | ICD-10-CM

## 2021-03-24 PROCEDURE — 93798 PHYS/QHP OP CAR RHAB W/ECG: CPT

## 2021-03-26 ENCOUNTER — TREATMENT (OUTPATIENT)
Dept: CARDIAC REHAB | Facility: HOSPITAL | Age: 61
End: 2021-03-26

## 2021-03-26 DIAGNOSIS — I21.4 NON-STEMI (NON-ST ELEVATED MYOCARDIAL INFARCTION) (HCC): Primary | ICD-10-CM

## 2021-03-26 DIAGNOSIS — Z95.1 S/P CABG X 4: ICD-10-CM

## 2021-03-26 PROCEDURE — 93798 PHYS/QHP OP CAR RHAB W/ECG: CPT

## 2021-03-29 ENCOUNTER — APPOINTMENT (OUTPATIENT)
Dept: CARDIAC REHAB | Facility: HOSPITAL | Age: 61
End: 2021-03-29

## 2021-03-31 ENCOUNTER — APPOINTMENT (OUTPATIENT)
Dept: CARDIAC REHAB | Facility: HOSPITAL | Age: 61
End: 2021-03-31

## 2021-04-01 ENCOUNTER — APPOINTMENT (OUTPATIENT)
Dept: GENERAL RADIOLOGY | Facility: HOSPITAL | Age: 61
End: 2021-04-01

## 2021-04-01 ENCOUNTER — HOSPITAL ENCOUNTER (INPATIENT)
Facility: HOSPITAL | Age: 61
LOS: 4 days | Discharge: HOME | End: 2021-04-06
Attending: EMERGENCY MEDICINE | Admitting: INTERNAL MEDICINE

## 2021-04-01 DIAGNOSIS — J81.0 ACUTE PULMONARY EDEMA (HCC): ICD-10-CM

## 2021-04-01 DIAGNOSIS — J96.01 ACUTE HYPOXEMIC RESPIRATORY FAILURE (HCC): Primary | ICD-10-CM

## 2021-04-01 DIAGNOSIS — J96.02 ACUTE RESPIRATORY ACIDOSIS (HCC): ICD-10-CM

## 2021-04-01 LAB
DEPRECATED RDW RBC AUTO: 43.9 FL (ref 37–54)
ERYTHROCYTE [DISTWIDTH] IN BLOOD BY AUTOMATED COUNT: 12.8 % (ref 12.3–15.4)
HCT VFR BLD AUTO: 49.9 % (ref 37.5–51)
HGB BLD-MCNC: 16.1 G/DL (ref 13–17.7)
MCH RBC QN AUTO: 30.3 PG (ref 26.6–33)
MCHC RBC AUTO-ENTMCNC: 32.3 G/DL (ref 31.5–35.7)
MCV RBC AUTO: 93.8 FL (ref 79–97)
NRBC BLD AUTO-RTO: 0 /100 WBC (ref 0–0.2)
PLATELET # BLD AUTO: 205 10*3/MM3 (ref 140–450)
PMV BLD AUTO: 11.9 FL (ref 6–12)
RBC # BLD AUTO: 5.32 10*6/MM3 (ref 4.14–5.8)
WBC # BLD AUTO: 15.66 10*3/MM3 (ref 3.4–10.8)

## 2021-04-01 PROCEDURE — 83735 ASSAY OF MAGNESIUM: CPT | Performed by: EMERGENCY MEDICINE

## 2021-04-01 PROCEDURE — 93010 ELECTROCARDIOGRAM REPORT: CPT | Performed by: INTERNAL MEDICINE

## 2021-04-01 PROCEDURE — 82803 BLOOD GASES ANY COMBINATION: CPT

## 2021-04-01 PROCEDURE — 80053 COMPREHEN METABOLIC PANEL: CPT | Performed by: EMERGENCY MEDICINE

## 2021-04-01 PROCEDURE — 83880 ASSAY OF NATRIURETIC PEPTIDE: CPT | Performed by: EMERGENCY MEDICINE

## 2021-04-01 PROCEDURE — 25010000002 SUCCINYLCHOLINE PER 20 MG: Performed by: EMERGENCY MEDICINE

## 2021-04-01 PROCEDURE — 85025 COMPLETE CBC W/AUTO DIFF WBC: CPT | Performed by: EMERGENCY MEDICINE

## 2021-04-01 PROCEDURE — 99291 CRITICAL CARE FIRST HOUR: CPT

## 2021-04-01 PROCEDURE — 82077 ASSAY SPEC XCP UR&BREATH IA: CPT | Performed by: EMERGENCY MEDICINE

## 2021-04-01 PROCEDURE — 0BH17EZ INSERTION OF ENDOTRACHEAL AIRWAY INTO TRACHEA, VIA NATURAL OR ARTIFICIAL OPENING: ICD-10-PCS | Performed by: EMERGENCY MEDICINE

## 2021-04-01 PROCEDURE — 85379 FIBRIN DEGRADATION QUANT: CPT | Performed by: EMERGENCY MEDICINE

## 2021-04-01 PROCEDURE — 84443 ASSAY THYROID STIM HORMONE: CPT | Performed by: EMERGENCY MEDICINE

## 2021-04-01 PROCEDURE — 84145 PROCALCITONIN (PCT): CPT | Performed by: EMERGENCY MEDICINE

## 2021-04-01 PROCEDURE — 71045 X-RAY EXAM CHEST 1 VIEW: CPT

## 2021-04-01 PROCEDURE — 80179 DRUG ASSAY SALICYLATE: CPT | Performed by: EMERGENCY MEDICINE

## 2021-04-01 PROCEDURE — 84484 ASSAY OF TROPONIN QUANT: CPT | Performed by: EMERGENCY MEDICINE

## 2021-04-01 PROCEDURE — 85007 BL SMEAR W/DIFF WBC COUNT: CPT | Performed by: EMERGENCY MEDICINE

## 2021-04-01 PROCEDURE — 25010000002 PROPOFOL 10 MG/ML EMULSION: Performed by: EMERGENCY MEDICINE

## 2021-04-01 PROCEDURE — 93005 ELECTROCARDIOGRAM TRACING: CPT | Performed by: EMERGENCY MEDICINE

## 2021-04-01 PROCEDURE — 36600 WITHDRAWAL OF ARTERIAL BLOOD: CPT

## 2021-04-01 PROCEDURE — 0202U NFCT DS 22 TRGT SARS-COV-2: CPT | Performed by: EMERGENCY MEDICINE

## 2021-04-01 PROCEDURE — 85610 PROTHROMBIN TIME: CPT | Performed by: EMERGENCY MEDICINE

## 2021-04-01 PROCEDURE — 31500 INSERT EMERGENCY AIRWAY: CPT

## 2021-04-01 PROCEDURE — 80143 DRUG ASSAY ACETAMINOPHEN: CPT | Performed by: EMERGENCY MEDICINE

## 2021-04-01 RX ORDER — ETOMIDATE 2 MG/ML
30 INJECTION INTRAVENOUS ONCE
Status: COMPLETED | OUTPATIENT
Start: 2021-04-01 | End: 2021-04-01

## 2021-04-01 RX ORDER — SUCCINYLCHOLINE CHLORIDE 20 MG/ML
200 INJECTION INTRAMUSCULAR; INTRAVENOUS ONCE
Status: COMPLETED | OUTPATIENT
Start: 2021-04-01 | End: 2021-04-01

## 2021-04-01 RX ORDER — FUROSEMIDE 10 MG/ML
40 INJECTION INTRAMUSCULAR; INTRAVENOUS ONCE
Status: COMPLETED | OUTPATIENT
Start: 2021-04-01 | End: 2021-04-02

## 2021-04-01 RX ADMIN — PROPOFOL 50 MCG/KG/MIN: 10 INJECTION, EMULSION INTRAVENOUS at 23:47

## 2021-04-01 RX ADMIN — SUCCINYLCHOLINE CHLORIDE 200 MG: 20 INJECTION, SOLUTION INTRAMUSCULAR; INTRAVENOUS; PARENTERAL at 23:35

## 2021-04-01 RX ADMIN — ETOMIDATE 30 MG: 2 INJECTION, SOLUTION INTRAVENOUS at 23:34

## 2021-04-02 ENCOUNTER — APPOINTMENT (OUTPATIENT)
Dept: GENERAL RADIOLOGY | Facility: HOSPITAL | Age: 61
End: 2021-04-02

## 2021-04-02 ENCOUNTER — APPOINTMENT (OUTPATIENT)
Dept: CARDIOLOGY | Facility: HOSPITAL | Age: 61
End: 2021-04-02

## 2021-04-02 ENCOUNTER — APPOINTMENT (OUTPATIENT)
Dept: CARDIAC REHAB | Facility: HOSPITAL | Age: 61
End: 2021-04-02

## 2021-04-02 PROBLEM — J96.01 ACUTE HYPOXEMIC RESPIRATORY FAILURE: Status: ACTIVE | Noted: 2021-04-02

## 2021-04-02 LAB
ALBUMIN SERPL-MCNC: 4.6 G/DL (ref 3.5–5.2)
ALBUMIN/GLOB SERPL: 2.1 G/DL
ALP SERPL-CCNC: 106 U/L (ref 39–117)
ALT SERPL W P-5'-P-CCNC: 21 U/L (ref 1–41)
ANION GAP SERPL CALCULATED.3IONS-SCNC: 11.9 MMOL/L (ref 5–15)
ANION GAP SERPL CALCULATED.3IONS-SCNC: 11.9 MMOL/L (ref 5–15)
AORTIC DIMENSIONLESS INDEX: 0.6 (DI)
APAP SERPL-MCNC: 9 MCG/ML (ref 0–30)
ARTERIAL PATENCY WRIST A: POSITIVE
AST SERPL-CCNC: 19 U/L (ref 1–40)
ATMOSPHERIC PRESS: 761.7 MMHG
ATMOSPHERIC PRESS: 762.8 MMHG
ATMOSPHERIC PRESS: 769.3 MMHG
B PARAPERT DNA SPEC QL NAA+PROBE: NOT DETECTED
B PERT DNA SPEC QL NAA+PROBE: NOT DETECTED
BASE EXCESS BLDA CALC-SCNC: -1.7 MMOL/L (ref 0–2)
BASE EXCESS BLDA CALC-SCNC: -4.7 MMOL/L (ref 0–2)
BASE EXCESS BLDA CALC-SCNC: -4.8 MMOL/L (ref 0–2)
BASOPHILS # BLD AUTO: 0.08 10*3/MM3 (ref 0–0.2)
BASOPHILS # BLD MANUAL: 0.17 10*3/MM3 (ref 0–0.2)
BASOPHILS NFR BLD AUTO: 0.4 % (ref 0–1.5)
BASOPHILS NFR BLD AUTO: 1.1 % (ref 0–1.5)
BDY SITE: ABNORMAL
BH CV ECHO MEAS - AO MAX PG (FULL): 6.3 MMHG
BH CV ECHO MEAS - AO MAX PG: 9.4 MMHG
BH CV ECHO MEAS - AO MEAN PG (FULL): 3 MMHG
BH CV ECHO MEAS - AO MEAN PG: 5 MMHG
BH CV ECHO MEAS - AO ROOT AREA (BSA CORRECTED): 1.7
BH CV ECHO MEAS - AO ROOT AREA: 11.9 CM^2
BH CV ECHO MEAS - AO ROOT DIAM: 3.9 CM
BH CV ECHO MEAS - AO V2 MAX: 153 CM/SEC
BH CV ECHO MEAS - AO V2 MEAN: 104 CM/SEC
BH CV ECHO MEAS - AO V2 VTI: 21.7 CM
BH CV ECHO MEAS - AVA(I,A): 2.6 CM^2
BH CV ECHO MEAS - AVA(I,D): 2.6 CM^2
BH CV ECHO MEAS - AVA(V,A): 2.4 CM^2
BH CV ECHO MEAS - AVA(V,D): 2.4 CM^2
BH CV ECHO MEAS - BSA(HAYCOCK): 2.4 M^2
BH CV ECHO MEAS - BSA: 2.3 M^2
BH CV ECHO MEAS - BZI_BMI: 33.9 KILOGRAMS/M^2
BH CV ECHO MEAS - BZI_METRIC_HEIGHT: 182.9 CM
BH CV ECHO MEAS - BZI_METRIC_WEIGHT: 113.4 KG
BH CV ECHO MEAS - EDV(CUBED): 68.9 ML
BH CV ECHO MEAS - EDV(MOD-SP2): 30 ML
BH CV ECHO MEAS - EDV(MOD-SP4): 32 ML
BH CV ECHO MEAS - EDV(TEICH): 74.2 ML
BH CV ECHO MEAS - EF(CUBED): 43 %
BH CV ECHO MEAS - EF(MOD-BP): 68.3 %
BH CV ECHO MEAS - EF(MOD-SP2): 73.3 %
BH CV ECHO MEAS - EF(MOD-SP4): 65.6 %
BH CV ECHO MEAS - EF(TEICH): 36.1 %
BH CV ECHO MEAS - ESV(CUBED): 39.3 ML
BH CV ECHO MEAS - ESV(MOD-SP2): 8 ML
BH CV ECHO MEAS - ESV(MOD-SP4): 11 ML
BH CV ECHO MEAS - ESV(TEICH): 47.4 ML
BH CV ECHO MEAS - FS: 17.1 %
BH CV ECHO MEAS - IVS/LVPW: 1
BH CV ECHO MEAS - IVSD: 1.6 CM
BH CV ECHO MEAS - LAT PEAK E' VEL: 4.8 CM/SEC
BH CV ECHO MEAS - LV DIASTOLIC VOL/BSA (35-75): 13.7 ML/M^2
BH CV ECHO MEAS - LV MASS(C)D: 266.9 GRAMS
BH CV ECHO MEAS - LV MASS(C)DI: 114 GRAMS/M^2
BH CV ECHO MEAS - LV MAX PG: 3.1 MMHG
BH CV ECHO MEAS - LV MEAN PG: 2 MMHG
BH CV ECHO MEAS - LV SYSTOLIC VOL/BSA (12-30): 4.7 ML/M^2
BH CV ECHO MEAS - LV V1 MAX: 88.1 CM/SEC
BH CV ECHO MEAS - LV V1 MEAN: 57.3 CM/SEC
BH CV ECHO MEAS - LV V1 VTI: 13.6 CM
BH CV ECHO MEAS - LVIDD: 4.1 CM
BH CV ECHO MEAS - LVIDS: 3.4 CM
BH CV ECHO MEAS - LVLD AP2: 6.4 CM
BH CV ECHO MEAS - LVLD AP4: 6.6 CM
BH CV ECHO MEAS - LVLS AP2: 4.5 CM
BH CV ECHO MEAS - LVLS AP4: 5.3 CM
BH CV ECHO MEAS - LVOT AREA (M): 4.2 CM^2
BH CV ECHO MEAS - LVOT AREA: 4.2 CM^2
BH CV ECHO MEAS - LVOT DIAM: 2.3 CM
BH CV ECHO MEAS - LVPWD: 1.6 CM
BH CV ECHO MEAS - MED PEAK E' VEL: 5.9 CM/SEC
BH CV ECHO MEAS - MV A MAX VEL: 55.3 CM/SEC
BH CV ECHO MEAS - MV DEC SLOPE: 167 CM/SEC^2
BH CV ECHO MEAS - MV DEC TIME: 315 SEC
BH CV ECHO MEAS - MV E MAX VEL: 47.6 CM/SEC
BH CV ECHO MEAS - MV E/A: 0.86
BH CV ECHO MEAS - MV MAX PG: 1.6 MMHG
BH CV ECHO MEAS - MV MEAN PG: 1 MMHG
BH CV ECHO MEAS - MV P1/2T MAX VEL: 55 CM/SEC
BH CV ECHO MEAS - MV P1/2T: 96.5 MSEC
BH CV ECHO MEAS - MV V2 MAX: 62.7 CM/SEC
BH CV ECHO MEAS - MV V2 MEAN: 40.3 CM/SEC
BH CV ECHO MEAS - MV V2 VTI: 18.7 CM
BH CV ECHO MEAS - MVA P1/2T LCG: 4 CM^2
BH CV ECHO MEAS - MVA(P1/2T): 2.3 CM^2
BH CV ECHO MEAS - MVA(VTI): 3 CM^2
BH CV ECHO MEAS - RAP SYSTOLE: 3 MMHG
BH CV ECHO MEAS - SI(AO): 110.7 ML/M^2
BH CV ECHO MEAS - SI(CUBED): 12.6 ML/M^2
BH CV ECHO MEAS - SI(LVOT): 24.1 ML/M^2
BH CV ECHO MEAS - SI(MOD-SP2): 9.4 ML/M^2
BH CV ECHO MEAS - SI(MOD-SP4): 9 ML/M^2
BH CV ECHO MEAS - SI(TEICH): 11.4 ML/M^2
BH CV ECHO MEAS - SV(AO): 259.2 ML
BH CV ECHO MEAS - SV(CUBED): 29.6 ML
BH CV ECHO MEAS - SV(LVOT): 56.5 ML
BH CV ECHO MEAS - SV(MOD-SP2): 22 ML
BH CV ECHO MEAS - SV(MOD-SP4): 21 ML
BH CV ECHO MEAS - SV(TEICH): 26.8 ML
BH CV ECHO MEAS - TAPSE (>1.6): 1.5 CM
BH CV ECHO MEASUREMENTS AVERAGE E/E' RATIO: 8.9
BH CV XLRA - RV BASE: 3.6 CM
BILIRUB SERPL-MCNC: 0.5 MG/DL (ref 0–1.2)
BUN SERPL-MCNC: 15 MG/DL (ref 8–23)
BUN SERPL-MCNC: 17 MG/DL (ref 8–23)
BUN/CREAT SERPL: 16.3 (ref 7–25)
BUN/CREAT SERPL: 16.5 (ref 7–25)
C PNEUM DNA NPH QL NAA+NON-PROBE: NOT DETECTED
CALCIUM SPEC-SCNC: 8.5 MG/DL (ref 8.6–10.5)
CALCIUM SPEC-SCNC: 8.8 MG/DL (ref 8.6–10.5)
CHLORIDE SERPL-SCNC: 104 MMOL/L (ref 98–107)
CHLORIDE SERPL-SCNC: 105 MMOL/L (ref 98–107)
CO2 SERPL-SCNC: 21.1 MMOL/L (ref 22–29)
CO2 SERPL-SCNC: 23.1 MMOL/L (ref 22–29)
CREAT SERPL-MCNC: 0.91 MG/DL (ref 0.76–1.27)
CREAT SERPL-MCNC: 1.04 MG/DL (ref 0.76–1.27)
D DIMER PPP FEU-MCNC: 1.09 MCGFEU/ML (ref 0–0.49)
D-LACTATE SERPL-SCNC: 1.7 MMOL/L (ref 0.5–2)
DEPRECATED RDW RBC AUTO: 44.7 FL (ref 37–54)
EOSINOPHIL # BLD AUTO: 0.02 10*3/MM3 (ref 0–0.4)
EOSINOPHIL NFR BLD AUTO: 0.1 % (ref 0.3–6.2)
ERYTHROCYTE [DISTWIDTH] IN BLOOD BY AUTOMATED COUNT: 12.9 % (ref 12.3–15.4)
ETHANOL BLD-MCNC: <10 MG/DL (ref 0–10)
ETHANOL UR QL: <0.01 %
FLUAV SUBTYP SPEC NAA+PROBE: NOT DETECTED
FLUBV RNA ISLT QL NAA+PROBE: NOT DETECTED
GFR SERPL CREATININE-BSD FRML MDRD: 72 ML/MIN/1.73
GFR SERPL CREATININE-BSD FRML MDRD: 84 ML/MIN/1.73
GLOBULIN UR ELPH-MCNC: 2.2 GM/DL
GLUCOSE BLDC GLUCOMTR-MCNC: 128 MG/DL (ref 70–130)
GLUCOSE SERPL-MCNC: 110 MG/DL (ref 65–99)
GLUCOSE SERPL-MCNC: 195 MG/DL (ref 65–99)
HADV DNA SPEC NAA+PROBE: NOT DETECTED
HCO3 BLDA-SCNC: 22.3 MMOL/L (ref 22–28)
HCO3 BLDA-SCNC: 22.9 MMOL/L (ref 22–28)
HCO3 BLDA-SCNC: 25.3 MMOL/L (ref 22–28)
HCOV 229E RNA SPEC QL NAA+PROBE: NOT DETECTED
HCOV HKU1 RNA SPEC QL NAA+PROBE: NOT DETECTED
HCOV NL63 RNA SPEC QL NAA+PROBE: NOT DETECTED
HCOV OC43 RNA SPEC QL NAA+PROBE: NOT DETECTED
HCT VFR BLD AUTO: 46.2 % (ref 37.5–51)
HGB BLD-MCNC: 15.2 G/DL (ref 13–17.7)
HMPV RNA NPH QL NAA+NON-PROBE: NOT DETECTED
HPIV1 RNA SPEC QL NAA+PROBE: NOT DETECTED
HPIV2 RNA SPEC QL NAA+PROBE: NOT DETECTED
HPIV3 RNA NPH QL NAA+PROBE: NOT DETECTED
HPIV4 P GENE NPH QL NAA+PROBE: NOT DETECTED
IMM GRANULOCYTES # BLD AUTO: 0.11 10*3/MM3 (ref 0–0.05)
IMM GRANULOCYTES NFR BLD AUTO: 0.6 % (ref 0–0.5)
INHALED O2 CONCENTRATION: 100 %
INHALED O2 CONCENTRATION: 100 %
INHALED O2 CONCENTRATION: 45 %
INR PPP: 1.1 (ref 0.9–1.1)
LEFT ATRIUM VOLUME INDEX: 13 ML/M2
LYMPHOCYTES # BLD AUTO: 3.72 10*3/MM3 (ref 0.7–3.1)
LYMPHOCYTES # BLD MANUAL: 4.31 10*3/MM3 (ref 0.7–3.1)
LYMPHOCYTES NFR BLD AUTO: 18.7 % (ref 19.6–45.3)
LYMPHOCYTES NFR BLD MANUAL: 27.5 % (ref 19.6–45.3)
LYMPHOCYTES NFR BLD MANUAL: 4.4 % (ref 5–12)
M PNEUMO IGG SER IA-ACNC: NOT DETECTED
MAGNESIUM SERPL-MCNC: 2.1 MG/DL (ref 1.6–2.4)
MCH RBC QN AUTO: 30.8 PG (ref 26.6–33)
MCHC RBC AUTO-ENTMCNC: 32.9 G/DL (ref 31.5–35.7)
MCV RBC AUTO: 93.7 FL (ref 79–97)
MODALITY: ABNORMAL
MONOCYTES # BLD AUTO: 0.69 10*3/MM3 (ref 0.1–0.9)
MONOCYTES # BLD AUTO: 2.09 10*3/MM3 (ref 0.1–0.9)
MONOCYTES NFR BLD AUTO: 10.5 % (ref 5–12)
NEUTROPHILS # BLD AUTO: 10.32 10*3/MM3 (ref 1.7–7)
NEUTROPHILS NFR BLD AUTO: 13.92 10*3/MM3 (ref 1.7–7)
NEUTROPHILS NFR BLD AUTO: 69.7 % (ref 42.7–76)
NEUTROPHILS NFR BLD MANUAL: 65.9 % (ref 42.7–76)
NRBC BLD AUTO-RTO: 0 /100 WBC (ref 0–0.2)
NT-PROBNP SERPL-MCNC: 634.7 PG/ML (ref 0–900)
O2 A-A PPRESDIFF RESPIRATORY: 0.1 MMHG
O2 A-A PPRESDIFF RESPIRATORY: 0.2 MMHG
O2 A-A PPRESDIFF RESPIRATORY: 0.3 MMHG
PCO2 BLDA: 34.6 MM HG (ref 35–45)
PCO2 BLDA: 51 MM HG (ref 35–45)
PCO2 BLDA: 66.4 MM HG (ref 35–45)
PEEP RESPIRATORY: 10 CM[H2O]
PEEP RESPIRATORY: 7 CM[H2O]
PEEP RESPIRATORY: 7 CM[H2O]
PH BLDA: 7.19 PH UNITS (ref 7.35–7.45)
PH BLDA: 7.26 PH UNITS (ref 7.35–7.45)
PH BLDA: 7.42 PH UNITS (ref 7.35–7.45)
PLAT MORPH BLD: NORMAL
PLATELET # BLD AUTO: 184 10*3/MM3 (ref 140–450)
PMV BLD AUTO: 12 FL (ref 6–12)
PO2 BLDA: 108.2 MM HG (ref 80–100)
PO2 BLDA: 81.7 MM HG (ref 80–100)
PO2 BLDA: 90.7 MM HG (ref 80–100)
POTASSIUM SERPL-SCNC: 4 MMOL/L (ref 3.5–5.2)
POTASSIUM SERPL-SCNC: 4.1 MMOL/L (ref 3.5–5.2)
PROCALCITONIN SERPL-MCNC: 0.05 NG/ML (ref 0–0.25)
PROT SERPL-MCNC: 6.8 G/DL (ref 6–8.5)
PROTHROMBIN TIME: 14 SECONDS (ref 11.7–14.2)
QT INTERVAL: 371 MS
QT INTERVAL: 415 MS
QT INTERVAL: 496 MS
RBC # BLD AUTO: 4.93 10*6/MM3 (ref 4.14–5.8)
RBC MORPH BLD: NORMAL
RHINOVIRUS RNA SPEC NAA+PROBE: NOT DETECTED
RSV RNA NPH QL NAA+NON-PROBE: NOT DETECTED
SALICYLATES SERPL-MCNC: <0.3 MG/DL
SAO2 % BLDCOA: 92.3 % (ref 92–99)
SAO2 % BLDCOA: 97.2 % (ref 92–99)
SAO2 % BLDCOA: 97.3 % (ref 92–99)
SARS-COV-2 RNA NPH QL NAA+NON-PROBE: NOT DETECTED
SET MECH RESP RATE: 22
SET MECH RESP RATE: 24
SET MECH RESP RATE: 28
SODIUM SERPL-SCNC: 138 MMOL/L (ref 136–145)
SODIUM SERPL-SCNC: 139 MMOL/L (ref 136–145)
TOTAL RATE: 24 BREATHS/MINUTE
TOTAL RATE: 24 BREATHS/MINUTE
TOTAL RATE: 28 BREATHS/MINUTE
TROPONIN T SERPL-MCNC: 0.04 NG/ML (ref 0–0.03)
TROPONIN T SERPL-MCNC: 0.11 NG/ML (ref 0–0.03)
TROPONIN T SERPL-MCNC: <0.01 NG/ML (ref 0–0.03)
TSH SERPL DL<=0.05 MIU/L-ACNC: 1.96 UIU/ML (ref 0.27–4.2)
VARIANT LYMPHS NFR BLD MANUAL: 1.1 % (ref 0–5)
VENTILATOR MODE: ABNORMAL
VT ON VENT VENT: 550 ML
VT ON VENT VENT: 550 ML
VT ON VENT VENT: 600 ML
WBC # BLD AUTO: 19.94 10*3/MM3 (ref 3.4–10.8)
WBC MORPH BLD: NORMAL

## 2021-04-02 PROCEDURE — 25010000002 MIDAZOLAM PER 1 MG

## 2021-04-02 PROCEDURE — 94799 UNLISTED PULMONARY SVC/PX: CPT

## 2021-04-02 PROCEDURE — 36600 WITHDRAWAL OF ARTERIAL BLOOD: CPT

## 2021-04-02 PROCEDURE — 93005 ELECTROCARDIOGRAM TRACING: CPT | Performed by: INTERNAL MEDICINE

## 2021-04-02 PROCEDURE — 25010000002 FUROSEMIDE PER 20 MG: Performed by: INTERNAL MEDICINE

## 2021-04-02 PROCEDURE — 25010000002 PROPOFOL 10 MG/ML EMULSION: Performed by: EMERGENCY MEDICINE

## 2021-04-02 PROCEDURE — 25010000002 ENOXAPARIN PER 10 MG: Performed by: INTERNAL MEDICINE

## 2021-04-02 PROCEDURE — 82803 BLOOD GASES ANY COMBINATION: CPT

## 2021-04-02 PROCEDURE — 74018 RADEX ABDOMEN 1 VIEW: CPT

## 2021-04-02 PROCEDURE — 93010 ELECTROCARDIOGRAM REPORT: CPT | Performed by: INTERNAL MEDICINE

## 2021-04-02 PROCEDURE — 82962 GLUCOSE BLOOD TEST: CPT

## 2021-04-02 PROCEDURE — 93306 TTE W/DOPPLER COMPLETE: CPT | Performed by: INTERNAL MEDICINE

## 2021-04-02 PROCEDURE — 5A1945Z RESPIRATORY VENTILATION, 24-96 CONSECUTIVE HOURS: ICD-10-PCS | Performed by: INTERNAL MEDICINE

## 2021-04-02 PROCEDURE — 94002 VENT MGMT INPAT INIT DAY: CPT

## 2021-04-02 PROCEDURE — 85025 COMPLETE CBC W/AUTO DIFF WBC: CPT | Performed by: INTERNAL MEDICINE

## 2021-04-02 PROCEDURE — 25010000002 MIDAZOLAM PER 1 MG: Performed by: EMERGENCY MEDICINE

## 2021-04-02 PROCEDURE — 94760 N-INVAS EAR/PLS OXIMETRY 1: CPT

## 2021-04-02 PROCEDURE — 83605 ASSAY OF LACTIC ACID: CPT | Performed by: EMERGENCY MEDICINE

## 2021-04-02 PROCEDURE — 84484 ASSAY OF TROPONIN QUANT: CPT | Performed by: INTERNAL MEDICINE

## 2021-04-02 PROCEDURE — 71045 X-RAY EXAM CHEST 1 VIEW: CPT

## 2021-04-02 PROCEDURE — 93306 TTE W/DOPPLER COMPLETE: CPT

## 2021-04-02 PROCEDURE — 80048 BASIC METABOLIC PNL TOTAL CA: CPT | Performed by: INTERNAL MEDICINE

## 2021-04-02 PROCEDURE — 87040 BLOOD CULTURE FOR BACTERIA: CPT | Performed by: EMERGENCY MEDICINE

## 2021-04-02 PROCEDURE — 84484 ASSAY OF TROPONIN QUANT: CPT | Performed by: EMERGENCY MEDICINE

## 2021-04-02 PROCEDURE — 25010000002 PERFLUTREN (DEFINITY) 8.476 MG IN SODIUM CHLORIDE (PF) 0.9 % 10 ML INJECTION: Performed by: INTERNAL MEDICINE

## 2021-04-02 PROCEDURE — 25010000002 FUROSEMIDE PER 20 MG: Performed by: EMERGENCY MEDICINE

## 2021-04-02 RX ORDER — MIDAZOLAM HYDROCHLORIDE 1 MG/ML
4 INJECTION INTRAMUSCULAR; INTRAVENOUS ONCE
Status: COMPLETED | OUTPATIENT
Start: 2021-04-02 | End: 2021-04-02

## 2021-04-02 RX ORDER — FENTANYL CITRATE 50 UG/ML
25 INJECTION, SOLUTION INTRAMUSCULAR; INTRAVENOUS
Status: DISCONTINUED | OUTPATIENT
Start: 2021-04-02 | End: 2021-04-04

## 2021-04-02 RX ORDER — FUROSEMIDE 10 MG/ML
40 INJECTION INTRAMUSCULAR; INTRAVENOUS ONCE
Status: COMPLETED | OUTPATIENT
Start: 2021-04-02 | End: 2021-04-02

## 2021-04-02 RX ORDER — CHLORHEXIDINE GLUCONATE 0.12 MG/ML
15 RINSE ORAL EVERY 12 HOURS SCHEDULED
Status: DISCONTINUED | OUTPATIENT
Start: 2021-04-02 | End: 2021-04-03

## 2021-04-02 RX ORDER — CLOPIDOGREL BISULFATE 75 MG/1
75 TABLET ORAL DAILY
Status: DISCONTINUED | OUTPATIENT
Start: 2021-04-02 | End: 2021-04-06 | Stop reason: HOSPADM

## 2021-04-02 RX ORDER — MIDAZOLAM HYDROCHLORIDE 1 MG/ML
2 INJECTION INTRAMUSCULAR; INTRAVENOUS ONCE
Status: COMPLETED | OUTPATIENT
Start: 2021-04-02 | End: 2021-04-02

## 2021-04-02 RX ORDER — MIDAZOLAM HYDROCHLORIDE 1 MG/ML
INJECTION INTRAMUSCULAR; INTRAVENOUS
Status: COMPLETED
Start: 2021-04-02 | End: 2021-04-02

## 2021-04-02 RX ORDER — FUROSEMIDE 10 MG/ML
40 INJECTION INTRAMUSCULAR; INTRAVENOUS EVERY 12 HOURS
Status: COMPLETED | OUTPATIENT
Start: 2021-04-02 | End: 2021-04-03

## 2021-04-02 RX ORDER — FAMOTIDINE 20 MG/1
20 TABLET, FILM COATED ORAL
Status: DISCONTINUED | OUTPATIENT
Start: 2021-04-02 | End: 2021-04-06 | Stop reason: HOSPADM

## 2021-04-02 RX ORDER — NOREPINEPHRINE BIT/0.9 % NACL 8 MG/250ML
.02-.3 INFUSION BOTTLE (ML) INTRAVENOUS
Status: DISCONTINUED | OUTPATIENT
Start: 2021-04-02 | End: 2021-04-04

## 2021-04-02 RX ORDER — NOREPINEPHRINE BIT/0.9 % NACL 8 MG/250ML
INFUSION BOTTLE (ML) INTRAVENOUS
Status: DISPENSED
Start: 2021-04-02 | End: 2021-04-02

## 2021-04-02 RX ADMIN — FUROSEMIDE 40 MG: 10 INJECTION, SOLUTION INTRAMUSCULAR; INTRAVENOUS at 13:37

## 2021-04-02 RX ADMIN — CLOPIDOGREL 75 MG: 75 TABLET, FILM COATED ORAL at 10:35

## 2021-04-02 RX ADMIN — Medication 0.02 MCG/KG/MIN: at 08:40

## 2021-04-02 RX ADMIN — Medication 0.08 MCG/KG/MIN: at 03:10

## 2021-04-02 RX ADMIN — PERFLUTREN 3 ML: 6.52 INJECTION, SUSPENSION INTRAVENOUS at 17:00

## 2021-04-02 RX ADMIN — FUROSEMIDE 40 MG: 10 INJECTION, SOLUTION INTRAMUSCULAR; INTRAVENOUS at 00:02

## 2021-04-02 RX ADMIN — CHLORHEXIDINE GLUCONATE 15 ML: 1.2 RINSE ORAL at 21:28

## 2021-04-02 RX ADMIN — PROPOFOL 30 MCG/KG/MIN: 10 INJECTION, EMULSION INTRAVENOUS at 22:54

## 2021-04-02 RX ADMIN — PROPOFOL 40 MCG/KG/MIN: 10 INJECTION, EMULSION INTRAVENOUS at 03:09

## 2021-04-02 RX ADMIN — FAMOTIDINE 20 MG: 20 TABLET, FILM COATED ORAL at 16:56

## 2021-04-02 RX ADMIN — MIDAZOLAM 2 MG: 1 INJECTION INTRAMUSCULAR; INTRAVENOUS at 00:17

## 2021-04-02 RX ADMIN — PROPOFOL 35 MCG/KG/MIN: 10 INJECTION, EMULSION INTRAVENOUS at 08:48

## 2021-04-02 RX ADMIN — PROPOFOL 35 MCG/KG/MIN: 10 INJECTION, EMULSION INTRAVENOUS at 13:36

## 2021-04-02 RX ADMIN — MIDAZOLAM 4 MG: 1 INJECTION INTRAMUSCULAR; INTRAVENOUS at 00:33

## 2021-04-02 RX ADMIN — MIDAZOLAM 4 MG: 1 INJECTION INTRAMUSCULAR; INTRAVENOUS at 00:54

## 2021-04-02 RX ADMIN — FUROSEMIDE 40 MG: 10 INJECTION, SOLUTION INTRAMUSCULAR; INTRAVENOUS at 15:06

## 2021-04-02 RX ADMIN — PROPOFOL 30 MCG/KG/MIN: 10 INJECTION, EMULSION INTRAVENOUS at 18:04

## 2021-04-02 RX ADMIN — PROPOFOL 25 MCG/KG/MIN: 10 INJECTION, EMULSION INTRAVENOUS at 01:27

## 2021-04-02 RX ADMIN — ENOXAPARIN SODIUM 40 MG: 40 INJECTION SUBCUTANEOUS at 15:06

## 2021-04-02 RX ADMIN — MIDAZOLAM HYDROCHLORIDE 4 MG: 1 INJECTION INTRAMUSCULAR; INTRAVENOUS at 00:33

## 2021-04-02 NOTE — PROGRESS NOTES
Discharge Planning Assessment  [facility]     Patient Name: Harvinder Vega  MRN: [id number]  Today's Date: 4/2/2021    Admit Date: 4/1/2021    Discharge Needs Assessment     Row Name 04/02/21 1032       Living Environment    Lives With  alone    Current Living Arrangements  home/apartment/condo    Potentially Unsafe Housing Conditions  unable to assess    Primary Care Provided by  self;child(chrystal)    Provides Primary Care For  no one    Family Caregiver if Needed  child(chrystal), adult    Quality of Family Relationships  supportive    Able to Return to Prior Arrangements  yes       Resource/Environmental Concerns    Resource/Environmental Concerns  none    Transportation Concerns  car, none       Transition Planning    Patient/Family Anticipates Transition to  home with family    Patient/Family Anticipated Services at Transition  none    Transportation Anticipated  family or friend will provide       Discharge Needs Assessment    Equipment Currently Used at Home  none    Concerns to be Addressed  discharge planning    Anticipated Changes Related to Illness  none    Equipment Needed After Discharge  none        Discharge Plan     Row Name 04/02/21 1036       Plan    Plan  Undetermined  CCP following for discharge needs  as the patient's clinical course evolves.    Plan Comments  IMM noted.  CCP spoke with patient’s son Edwin, [phone] via telephone.  CCP role explained and discharge planning discussed. Face sheet verified. Patient’s PCP is Dr. Bruno Ricci.   Pt emergency contact is his son.  Pt lives in a single story alone.   He uses no DME to ambulate.   Patient is independent with ADL’s. Pt has no past history of HH.  Pt has not been to rehab in the past.  Pt obtains his medications from [facility]s   pharmacy in [facility].    Plan is undetermined .  CP following for DC needs        Continued Care and Services - Admitted Since 4/1/2021    Coordination has not been started for this encounter.          Demographic Summary    No documentation.       Functional Status    No documentation.       Psychosocial    No documentation.       Abuse/Neglect    No documentation.       Legal    No documentation.       Substance Abuse    No documentation.       Patient Forms    No documentation.           Charlene Clemons RN

## 2021-04-02 NOTE — PAYOR COMM NOTE
"Harvinder Vega (61 y.o. Male)                            ATTENTION;   INITIAL CLINICAL FOR REVIEW, AUTH PENDING [id number]                          REPLY TO [facility] DEPT, EBER HOYOS LPN  [phone] OR CALL            Date of Birth Social Security Number Address Home Phone MRN    [id number]  [id number]  [facility] [address] [phone] [id number]    Sikhism Marital Status          Unknown        Admission Date Admission Type Admitting Provider Attending Provider Department, Room/Bed    4/1/21 Emergency Angelica Hunt MD Jambeih, Rami, MD [facility] INTENSIVE CARE, I375/1    Discharge Date Discharge Disposition Discharge Destination                       Attending Provider: Angelica Hunt MD    Allergies: No Known Allergies    Isolation: Enh Drop/Con   Infection: COVID (rule out) (04/01/21)   Code Status: Prior    Ht: 182.9 cm (72\")   Wt: 113 kg (250 lb)    Admission Cmt: None   Principal Problem: None                Active Insurance as of 4/1/2021     Primary Coverage     Payor Plan Insurance Group Employer/Plan Group    ANTHEM BLUE CROSS ANTHEM BLUE CROSS BLUE SHIELD PPO [id number]     Payor Plan Address Payor Plan Phone Number Payor Plan Fax Number Effective Dates    [address] [phone]  2/1/2016 - None Entered    [place] [address]       Subscriber Name Subscriber Birth Date Member ID       HARVINDER VEGA 1960 [id number]                 Emergency Contacts      (Rel.) Home Phone Work Phone Mobile Phone    JESUS VEGA (Son) -- -- [phone]               History & Physical      Angelica Hunt MD at 04/02/21 0020                        Patient Care Team:  Bruno Ricci MD as PCP - General (Internal Medicine)    Chief complaint:  Shortness of breath    History of present illness:  Subjective   This is a 61-year-old male patient who presented to the ED today for shortness of breath.  He was noted to have bilateral " pulmonary infiltrates.  He was in respiratory distress and required 15 L O2 by nasal cannula and despite that he remained hypoxic and had signs of respiratory failure and therefore he was intubated.    He has a history of CAD s/p CABG Nov @ [place].    Dyspnea apparently occurred suddenly about 2 hours prior to presentation.  Blood pressure in the ED was 210/132 BP then dropped after intubation and sedation requiring intravenous pressors with Levophed.  In addition, patient has already received Lasix 40 mg IV in the ER.    On my evaluation of the patient, he was sedated with Levophed and not responsive to verbal stimulus.  No family at bedside to provide with history.  Most of the information was obtained from the chart and after discussion with the staff and ED provider.    Lasix 40 mg IV x1 administered in the ED.  Echo 2/9/2021: EF normal; severely increased LA size; moderate severe MR.  Mild AR.    Labs reviewed:  ABG 7.19/66/81 100% FiO2 with rate of 22 then 7.26/51/108 on 100% with rate of 28.  CMP normal; WBC 15.6; hemoglobin 16    Review of Systems:  Cannot obtain due to mechanical ventilation and sedation    History  Past Medical History:   Diagnosis Date   • Arthritis    • Claustrophobia    • Coronary artery disease    • Herniated intervertebral disc of lumbar spine    • Hyperlipidemia    • Low back pain      Past Surgical History:   Procedure Laterality Date   • CORONARY ARTERY BYPASS GRAFT     • GANGLION CYST EXCISION Right     ANKLE   • KNEE SURGERY Right 2013   • LUMBAR EPIDURAL INJECTION     • LUMBAR LAMINECTOMY DISCECTOMY DECOMPRESSION Left 8/31/2017    Procedure: Left L4-5, L5-S1 laminectomy;  Surgeon: Galindo Hernandes MD;  Location: [facility];  Service:    • ROTATOR CUFF REPAIR Right    • SHOULDER SURGERY Right 2013   • TONSILECTOMY, ADENOIDECTOMY, BILATERAL MYRINGOTOMY AND TUBES      1962     Family History   Problem Relation Age of Onset   • Heart failure Mother    • Heart failure  Father    • Diabetes Maternal Grandfather    • Malig Hyperthermia Neg Hx      Social History     Tobacco Use   • Smoking status: Former Smoker     Packs/day: 1.50     Years: 30.00     Pack years: 45.00     Types: Cigarettes     Quit date: 2020     Years since quittin.3   • Smokeless tobacco: Never Used   Substance Use Topics   • Alcohol use: Not Currently   • Drug use: Defer     Allergies:  Patient has no known allergies.    Objective   Vital Signs  Heart Rate:  [] 56  Resp:  [22-28] 28  BP: (108-210)/() 108/76  FiO2 (%):  [100 %] 100 %    Physical Exam:  Constitutional: Not in acute distress.  Eyes: Injected conjunctiva. Pupils equal and reactive to light.   ENMT: ET tube noted.  External nares normal.  Moist tongue.  Neck: No thyromegaly.  Trachea midline  Heart: RRR, no murmur.  Grade 2 pitting edema in legs.  Lungs: Good and equal air entry bilaterally with clear auscultation.  Mild coarse breath sounds.  No wheezing.  Nonlabored breathing  Abdomen: Obese. Soft. No tenderness or dullness.  Positive bowel sounds  Extremities: No cyanosis, clubbing. Warm extremities and well-perfused  Neuro: Sedated on mechanical ventilation.  Does not withdraw to pain or respond to verbal stimuli.  Psych: Cannot assess   Integumentary: No rash or ecchymosis  Lymphatic: No palpable cervical or supraclavicular lymph nodes.            Diagnostic imaging:  I personally and independently reviewed the following images:   CXR 2021: Diffuse bilateral pulmonary edema.      EKG/: NSR.  Partial RBBB.  Mild ST depression in V2 and V3.      Assessment   1. Acute pulmonary edema, probably secondary to hypertension  2. Hypertensive emergency on presentation  3. Hypotension, probably induced by sedation and positive pressure ventilation  4. Acute hypercapnic and hypoxic respiratory failure  5. Moderate to severe MR  6. CAD s/p CABG in 2020  7. Partial RBBB  8. Abnormal EKG: Mild ST depression in leads V2  and V3 of less than 2 mm.      Plan:  · Vent management: Reduced RR to 24.  Increase PEEP to 12.  Continue .  Titrate FiO2 to keep SPO2 >92% and I reduced it to 80%.  · Levophed to keep MAP >65.  This has been titrated down since the patient got admitted to the intensive care unit and he is now requiring low-dose which is basically to offset the effect of sedatives.  We will continue the in a peripheral line.  If his pressors requirement are increasing then place a central line.  We will try to titrate propofol down due to this matter as well  · Propofol.  Titrate to keep YOBANY -1-0. PRN fentanyl  · CXR in AM.  Redose of Lasix if he continues to show pulmonary infiltrates  · Noted normal troponin x1.  Repeat again in a.m. and repeat EKG due to abnormalities noted above  · Hold statin in acute illness  · Hold carvedilol due to hypotension  · Resume Plavix  · DVT prophylaxis with Lovenox    Discussed with ED provider and ICU staff    Angelica Hunt MD  04/02/21  00:20 EDT    Time: Critical care 41 min      This note was dictated utilizing Dragon dictation    Electronically signed by Angelica Hunt MD at 04/02/21 0358          Emergency Department Notes      Mikhail Schmitz MD at 04/01/21 2330      Procedure Orders    1. Intubation [[id number]] ordered by Mikhail Schmitz MD                EMERGENCY DEPARTMENT ENCOUNTER    Room Number:  24/24  Date of encounter:  4/2/2021  PCP: Bruno Ricci MD  Historian: Patient    Patient was placed in face mask during triage process. Patient was wearing facemask when I entered the room and throughout our encounter. I wore full protective equipment throughout this patient encounter including a face mask, eye protection, and gloves. Hand hygiene was performed before donning protective equipment and again following doffing of PPE after leaving the room.    HPI:  Chief Complaint: Dyspnea  A complete HPI/ROS/PMH/PSH/SH/FH are unobtainable due to: N/A   Context: Harvinder Vega is a 61  y.o. male who presents to the ED c/o abrupt onset dyspnea 1.5 hours prior to arrival in the emergency department.  Patient notes diaphoresis but no nausea or chest pain.  He was at rest at time of onset.  He felt at baseline earlier in the day has had no recent cough or fever.  Patient does have a history of quadruple bypass in November 2020.  Is not been having any chest pain lately.  Former smoker quit at the time of his bypass.      MEDICAL HISTORY REVIEW  CAD, hyperlipidemia, hypertension.  Former smoker.    Cardiology note 1/27/2021-Dr. East  On November 14 the patient experienced a non-ST elevation myocardial infarction was complicated by cardiogenic shock.  He was found to have an occluded right coronary artery with collateralization but diffuse three-vessel coronary disease and left ventricular dysfunction with an ejection fraction of approximately 30 to 35%.  He underwent three-vessel grafting with an internal mammary graft to the LAD.  In individual saphenous vein grafts to the diagonal branch of the LAD, obtuse marginal branch of the circumflex and posterior descending coronary artery of the right coronary artery.       Cardiology note:  PAST MEDICAL HISTORY  Active Ambulatory Problems     Diagnosis Date Noted   • Spinal stenosis of lumbar region 08/09/2017   • Coronary artery disease involving coronary bypass graft of native heart without angina pectoris 01/27/2021   • Mixed hyperlipidemia 01/27/2021   • Over weight 01/27/2021   • Acute ST elevation myocardial infarction (STEMI) (CMS/[facility]) 01/27/2021     Resolved Ambulatory Problems     Diagnosis Date Noted   • No Resolved Ambulatory Problems     Past Medical History:   Diagnosis Date   • Arthritis    • Claustrophobia    • Coronary artery disease    • Herniated intervertebral disc of lumbar spine    • Hyperlipidemia    • Low back pain          PAST SURGICAL HISTORY  Past Surgical History:   Procedure Laterality Date   • CORONARY ARTERY BYPASS GRAFT      • GANGLION CYST EXCISION Right     ANKLE   • KNEE SURGERY Right 2013   • LUMBAR EPIDURAL INJECTION     • LUMBAR LAMINECTOMY DISCECTOMY DECOMPRESSION Left 2017    Procedure: Left L4-5, L5-S1 laminectomy;  Surgeon: Galindo Hernandes MD;  Location: [facility] OR;  Service:    • ROTATOR CUFF REPAIR Right    • SHOULDER SURGERY Right    • TONSILECTOMY, ADENOIDECTOMY, BILATERAL MYRINGOTOMY AND TUBES               FAMILY HISTORY  Family History   Problem Relation Age of Onset   • Heart failure Mother    • Heart failure Father    • Diabetes Maternal Grandfather    • Malig Hyperthermia Neg Hx          SOCIAL HISTORY  Social History     Socioeconomic History   • Marital status:      Spouse name: Not on file   • Number of children: Not on file   • Years of education: Not on file   • Highest education level: Not on file   Tobacco Use   • Smoking status: Former Smoker     Packs/day: 1.50     Years: 30.00     Pack years: 45.00     Types: Cigarettes     Quit date: 2020     Years since quittin.3   • Smokeless tobacco: Never Used   Substance and Sexual Activity   • Alcohol use: Not Currently   • Drug use: Defer   • Sexual activity: Yes     Partners: Female         ALLERGIES  Patient has no known allergies.        REVIEW OF SYSTEMS  Review of Systems     All systems reviewed and negative except for those discussed in HPI.       PHYSICAL EXAM    I have reviewed the triage vital signs and nursing notes.    ED Triage Vitals   Temp Heart Rate Resp BP SpO2   -- 21    103 24 (!) 210/132 (!) 87 %      Temp src Heart Rate Source Patient Position BP Location FiO2 (%)   -- 21 --    Monitor Sitting Right arm        Physical Exam    Physical Exam   Constitutional: Diaphoretic, ashen, and respiratory distress.  Mentating at this time appropriately..   HENT:  Head: Normocephalic and atraumatic.   Oropharynx: Mucous membranes  are moist.   Eyes: No scleral icterus. No conjunctival pallor.  Neck: Painless range of motion noted. Neck supple.   Cardiovascular: Tachycardic.  Strong pulses noted.  Pulmonary/Chest: Significant increased work of breathing.  Diminished bibasilar without crackles or wheezes.  Well-healed sternotomy.  Abdominal: Soft. There is no tenderness. There is no rebound and no guarding.   Musculoskeletal: Moves all extremities equally. There is no pedal edema or calf tenderness.   Neurological: Alert.  Baseline strength and sensation noted.   Skin: Skin is pink, warm, but very diaphoretic  Psychiatric: Mood and affect normal.   Nursing note and vitals reviewed.    LAB RESULTS  Recent Results (from the past 24 hour(s))   ECG 12 Lead    Collection Time: 04/01/21 11:27 PM   Result Value Ref Range    QT Interval 371 ms   ECG 12 Lead    Collection Time: 04/01/21 11:40 PM   Result Value Ref Range    QT Interval 415 ms   Comprehensive Metabolic Panel    Collection Time: 04/01/21 11:42 PM    Specimen: Blood   Result Value Ref Range    Glucose 195 (H) 65 - 99 mg/dL    BUN 15 8 - 23 mg/dL    Creatinine 0.91 0.76 - 1.27 mg/dL    Sodium 139 136 - 145 mmol/L    Potassium 4.0 3.5 - 5.2 mmol/L    Chloride 104 98 - 107 mmol/L    CO2 23.1 22.0 - 29.0 mmol/L    Calcium 8.5 (L) 8.6 - 10.5 mg/dL    Total Protein 6.8 6.0 - 8.5 g/dL    Albumin 4.60 3.50 - 5.20 g/dL    ALT (SGPT) 21 1 - 41 U/L    AST (SGOT) 19 1 - 40 U/L    Alkaline Phosphatase 106 39 - 117 U/L    Total Bilirubin 0.5 0.0 - 1.2 mg/dL    eGFR Non African Amer 84 >60 mL/min/1.73    Globulin 2.2 gm/dL    A/G Ratio 2.1 g/dL    BUN/Creatinine Ratio 16.5 7.0 - 25.0    Anion Gap 11.9 5.0 - 15.0 mmol/L   Protime-INR    Collection Time: 04/01/21 11:42 PM    Specimen: Blood   Result Value Ref Range    Protime 14.0 11.7 - 14.2 Seconds    INR 1.10 0.90 - 1.10   CBC Auto Differential    Collection Time: 04/01/21 11:42 PM    Specimen: Blood   Result Value Ref Range    WBC 15.66 (H) 3.40 - 10.80  10*3/mm3    RBC 5.32 4.14 - 5.80 10*6/mm3    Hemoglobin 16.1 13.0 - 17.7 g/dL    Hematocrit 49.9 37.5 - 51.0 %    MCV 93.8 79.0 - 97.0 fL    MCH 30.3 26.6 - 33.0 pg    MCHC 32.3 31.5 - 35.7 g/dL    RDW 12.8 12.3 - 15.4 %    RDW-SD 43.9 37.0 - 54.0 fl    MPV 11.9 6.0 - 12.0 fL    Platelets 205 140 - 450 10*3/mm3    nRBC 0.0 0.0 - 0.2 /100 WBC   Troponin    Collection Time: 04/01/21 11:42 PM    Specimen: Blood   Result Value Ref Range    Troponin T <0.010 0.000 - 0.030 ng/mL   BNP    Collection Time: 04/01/21 11:42 PM    Specimen: Blood   Result Value Ref Range    proBNP 634.7 0.0 - 900.0 pg/mL   D-dimer, Quantitative    Collection Time: 04/01/21 11:42 PM    Specimen: Blood   Result Value Ref Range    D-Dimer, Quantitative 1.09 (H) 0.00 - 0.49 MCGFEU/mL   Ethanol    Collection Time: 04/01/21 11:42 PM    Specimen: Blood   Result Value Ref Range    Ethanol <10 0 - 10 mg/dL    Ethanol % <0.010 %   Magnesium    Collection Time: 04/01/21 11:42 PM    Specimen: Blood   Result Value Ref Range    Magnesium 2.1 1.6 - 2.4 mg/dL   Procalcitonin    Collection Time: 04/01/21 11:42 PM    Specimen: Blood   Result Value Ref Range    Procalcitonin 0.05 0.00 - 0.25 ng/mL   TSH    Collection Time: 04/01/21 11:42 PM    Specimen: Blood   Result Value Ref Range    TSH 1.960 0.270 - 4.200 uIU/mL   Acetaminophen Level    Collection Time: 04/01/21 11:42 PM    Specimen: Blood   Result Value Ref Range    Acetaminophen 9.0 0.0 - 30.0 mcg/mL   Salicylate Level    Collection Time: 04/01/21 11:42 PM    Specimen: Blood   Result Value Ref Range    Salicylate <0.3 <=30.0 mg/dL   Blood Gas, Arterial -    Collection Time: 04/01/21 11:58 PM    Specimen: Arterial Blood   Result Value Ref Range    Site Arterial: right radial     Orlando's Test Positive     pH, Arterial 7.189 (C) 7.350 - 7.450 pH units    pCO2, Arterial 66.4 (C) 35.0 - 45.0 mm Hg    pO2, Arterial 81.7 80.0 - 100.0 mm Hg    HCO3, Arterial 25.3 22.0 - 28.0 mmol/L    Base Excess, Arterial -4.8  (L) 0.0 - 2.0 mmol/L    O2 Saturation Calculated 92.3 92.0 - 99.0 %    A-a Gradiant 0.1 mmHg    Barometric Pressure for Blood Gas 761.7 mmHg    Modality Adult Vent     FIO2 100 %    Ventilator Mode VC     Set Tidal Volume 600     Set Mech Resp Rate 22     Rate 24 Breaths/minute    PEEP 7        Ordered the above labs and independently reviewed the results.        RADIOLOGY  XR Chest 1 View    Result Date: 4/2/2021  Patient: JAYDA GALLEGOS  Time Out: 00:17 Exam(s): FILM CXR 1 VIEW EXAM:   XR Chest, 1 View CLINICAL HISTORY:    ett placement  Reason for exam: ett placement. TECHNIQUE:   Frontal view of the chest. COMPARISON:   No relevant prior studies available. FINDINGS:   ETT terminates above the arelis.  There is borderline to mild cardiac enlargement.  Bilateral diffuse patchy airspace disease, more confluent in the right infrahilar region.  Findings suggest bilateral pneumonia with superimposed pulmonary vascular congestion.  Follow-up to resolution recommended.  Negative for pneumothorax.     Electronically signed by Michelet Miner DO on 04-02-21 at 0017      I ordered the above noted radiological studies. Reviewed by me and discussed with radiologist.  See dictation for official radiology interpretation.      PROCEDURES    Intubation    Date/Time: 4/1/2021 11:54 PM  Performed by: Mikhail Schmitz MD  Authorized by: Mikhail Schmitz MD     Consent:     Consent obtained:  Verbal    Consent given by:  Patient    Alternatives discussed:  Delayed treatment and alternative treatment  Pre-procedure details:     Patient status:  Awake    Mallampati score:  II    Pretreatment medications:  None    Paralytics:  Succinylcholine  Procedure details:     Preoxygenation:  Nonrebreather mask    Laryngoscope blade:  Mac 4    Tube size (mm):  7.5    Tube type:  Cuffed    Number of attempts:  1  Placement assessment:     ETT to lip:  26.5    Tube secured with:  Adhesive tape and ETT huynh    Breath sounds:  Equal    Placement  verification: chest rise, condensation, CXR verification, direct visualization, equal breath sounds and ETCO2 detector      CXR findings:  ETT in proper place  Post-procedure details:     Patient tolerance of procedure:  Tolerated well, no immediate complications            MEDICATIONS GIVEN IN ER    Medications   propofol (DIPRIVAN) infusion 10 mg/mL 100 mL (40 mcg/kg/min × 113 kg Intravenous Rate/Dose Change 4/1/21 9737)   norepinephrine (LEVOPHED) 8 mg in 250 mL NS infusion (premix) (has no administration in time range)   Norepinephrine-Sodium Chloride (LEVOPHED) 8-0.9 MG/250ML-% infusion solution  - ADS Override Pull (has no administration in time range)   atropine sulfate injection  - ADS Override Pull (has no administration in time range)   etomidate (AMIDATE) injection 30 mg (30 mg Intravenous Given by Other 4/1/21 2334)   succinylcholine (ANECTINE) injection 200 mg (200 mg Intravenous Given by Other 4/1/21 2335)   furosemide (LASIX) injection 40 mg (40 mg Intravenous Given 4/2/21 0002)   midazolam (VERSED) injection 2 mg (2 mg Intravenous Given 4/2/21 0017)         PROGRESS, DATA ANALYSIS, CONSULTS, AND MEDICAL DECISION MAKING    My differential diagnosis for dyspnea includes but is not limited to:  Asthma, COPD, pneumonia, pulmonary embolus, acute respiratory distress syndrome, pneumothorax, pleural effusion, pulmonary fibrosis, congestive heart failure, myocardial infarction, DKA, uremia, acidosis, sepsis, anemia, drug related, hyperventilation, CNS disease    Total critical care time: Approximately 35 minutes    Due to a high probability of clinically significant, life threatening deterioration, the patient required my highest level of preparedness to intervene emergently and I personally spent this critical care time directly and personally managing the patient. This critical care time included obtaining a history; examining the patient; vital sign monitoring; ordering and review of studies; arranging  urgent treatment with development of a management plan; evaluation of patient's response to treatment; frequent reassessment; and, discussions with other providers.    This critical care time was performed to assess and manage the high probability of imminent, life-threatening deterioration that could result in multi-organ failure. It was exclusive of separately billable procedures and treating other patients and teaching time.    Please see MDM section and the rest of the note for further information on patient assessment and treatment.      All labs have been independently reviewed by me.  All radiology studies have been reviewed by me and discussed with radiologist dictating the report.   EKG's independently viewed and interpreted by me.  Discussion below represents my analysis of pertinent findings related to patient's condition, differential diagnosis, treatment plan and final disposition.      ED Course as of Apr 02 0028   Thu Apr 01, 2021   2351 EKG           EKG time: 23 2 7  Rhythm/Rate: Sinus with bigeminy PVCs; rate 100  P waves and MO: FRANCISCO within normal limits with prominent P waves  QRS, axis: IVCD consistent with right bundle branch block  ST and T waves: No STEMI; QTC within normal limits    Interpreted Contemporaneously by me, independently viewed  Comparison: Unavailable      [RS]   2351 EKG   #2      EKG time: 2340  Rhythm/Rate: Sinus, 75  P waves and MO: FRANCISCO within normal limits  QRS, axis: Wide-complex consistent with right bundle branch block  ST and T waves: ST repolarization abnormality; there is some ST depression V2 V3 and V4; no STEMI appreciated; QTC within normal limits    Interpreted Contemporaneously by me, independently viewed  Comparison: Improved from prior      [RS]   2352 RADIOLOGY        Study: Portable chest x-ray-1 view    Findings: Significant pulmonary edema right greater than left with no pneumothorax appreciated.    Interpreted contemporaneously with treatment by me,  independently viewed by me        [RS]   Fri Apr 02, 2021   0026 CONSULT        Provider: Dr. Hunt-pulmonary/critical care    Discussion: Reviewed patient history, ED presentation and evaluation as well as interventions, current drip settings, and current vital signs as well as vent settings.  He is agreeable to accept the patient for admission to the ICU.  He agrees with holding off on fluid boluses at this time and titrating nor epi as needed.  He also agrees with holding off on any further sedation drips and rather providing as needed Versed given the patient's hemodynamic instability at this time.  He accepts this patient for ICU admission for further evaluation and treatment.    Agreeable c treatment and planned disposition.            [RS]      ED Course User Index  [RS] Mikhail Schmitz MD       AS OF 00:28 EDT VITALS:    BP - 108/76  HR - 56  TEMP -    O2 SATS - 97%        DIAGNOSIS  Final diagnoses:   Acute hypoxemic respiratory failure (CMS/HCC)   Acute pulmonary edema (CMS/HCC)   Acute respiratory acidosis         DISPOSITION  ADMISSION    Discussed treatment plan and reason for admission with pt/family and admitting physician.  Pt/family voiced understanding of the plan for admission for further testing/treatment as needed.          Mikhail Schmitz MD  04/02/21 0028      Electronically signed by Mikhail Schmitz MD at 04/02/21 0028     Padmini Mcdonald, RN at 04/01/21 9854        Pt arrived via ems with c/o soa. Pt states he has been feeling well all day then suddenly couldn't breathe.  Pt with increased work of effort and low spo2 (88% on 15 lpm NRB per ems).  Dr Schmitz discussed with patient, and elective intubation was decided upon, rather than cpap.  Pt intubated with 7.5 ett, 25.5 cm at lip, via glidescope per Dr Schmitz.  Pt tolerated well.  Xray confirmed fluid on lungs, which correlates with bibasilar diminished sounds.  Pt given lasix iv and started on propofol drip.  Pt did not tolerate propofol,  and blood pressure dropped, so propofol stopped, norepi started.  Pt given versed prn for decreased sedation.  Report given to jordy costa, in icu and pt transported with rn, tech and respiratory therapy.  Pt tolerated well.     Padmini Mcdonald RN  04/02/21 0119      Electronically signed by Padmini Mcdonald RN at 04/02/21 0119       Vital Signs     Date/Time   Temp   Temp src   Pulse   Resp   BP   Patient Position   SpO2    04/02/21 1200   --   --   64   --   93/61   --   98    04/02/21 1109   --   --   64   24   --   --   98    04/02/21 1100   98.6 (37)   Oral   63   --   103/64   --   99    04/02/21 1000   --   --   61   --   117/66   --   100    04/02/21 0900   --   --   61   --   (!) 77/52   --   96    04/02/21 0850   --   --   60   24   --   --   100    04/02/21 0800   --   --   61   --   127/78   --   100    04/02/21 0700   --   --   60   --   107/68   --   99    04/02/21 0645   --   --   61   --   112/76   --   99    04/02/21 0630   --   --   59   --   123/82   --   99    04/02/21 0615   --   --   60   --   118/80   --   99    04/02/21 0600   --   --   61   --   127/82   --   99    04/02/21 0545   --   --   58   --   128/80   --   100    04/02/21 0530   --   --   60   --   126/84   --   100    04/02/21 0515   --   --   60   --   134/86   --   100    04/02/21 0500   --   --   60   --   109/91   --   100    04/02/21 0430   --   --   62   --   95/70   --   99    04/02/21 0415   --   --   61   --   94/72   --   100    04/02/21 0400   --   --   63   --   91/65   --   99    04/02/21 0345   --   --   67   --   92/62   --   98    04/02/21 0330   --   --   67   --   91/62   --   99    04/02/21 0310   --   --   66   28   --   --   99    04/02/21 0300   --   --   66   --   (!) 85/53   --   99    04/02/21 0245   --   --   67   --   (!) 165/102   --   100    04/02/21 0230   --   --   63   --   92/67   --   100    04/02/21 0215   --   --   98   --   97/75   --   98    04/02/21 0207   --   --   83   --   (!) 187/88    --   99    04/02/21 0200   --   --   66   --   (!) 192/115   --   99    04/02/21 0145   --   --   79   --   (!) 189/95   --   98    04/02/21 0130   --   --   86   --   (!) 179/151   --   100    04/02/21 0125   --   --   67   --   180/90   --   100    04/02/21 0115   --   --   64   --   122/88   --   99    04/02/21 0110   --   --   65   --   (!) 130/120   --   99    04/02/21 00:57:06   --   --   65   28   (!) 89/74   Lying   98    04/02/21 0037   96.8 (36)   Tympanic   66   28   107/69   Lying   99    04/02/21 0000   --   --   56   28   --   --   97    04/01/21 23:58:02   --   --   60   22   108/76   Lying   94    04/01/21 23:40:44   --   --   73   --   (!) 169/119   Sitting   --    04/01/21 2329   --   --   --   --   (!) 210/132   Sitting   --    04/01/21 2327   --   --   103   24   --   --   (!) 87    04/01/21 2325   --   --   99   24   (!) 210/132   --   --              Oxygen Therapy (last 3 days)     Date/Time   SpO2   Device (Oxygen Therapy)   Flow (L/min)   Oxygen Concentration (%)   ETCO2 (mmHg)    04/02/21 1200   98   --   --   --   --    04/02/21 1109   98   ventilator   --   45   --    04/02/21 1100   99   --   --   --   --    04/02/21 1000   100   --   --   --   --    04/02/21 0900   96   --   --   --   --    04/02/21 0850   100   face tent;ventilator   --   80   --    04/02/21 0800   100   --   --   --   --    04/02/21 0700   99   --   --   --   --    04/02/21 0645   99   --   --   --   --    04/02/21 0630   99   --   --   --   --    04/02/21 0615   99   --   --   --   --    04/02/21 0600   99   --   --   --   --    04/02/21 0545   100   --   --   --   --    04/02/21 0530   100   --   --   --   --    04/02/21 0515   100   --   --   --   --    04/02/21 0500   100   --   --   --   --    04/02/21 0430   99   --   --   --   --    04/02/21 0415   100   --   --   --   --    04/02/21 0400   99   ventilator   --   80   --    04/02/21 0345   98   --   --   --   --    04/02/21 0330   99   --   --   --   --     04/02/21 0310   99   ventilator   --   80   --    04/02/21 0300   99   --   --   --   --    04/02/21 0245   100   --   --   --   --    04/02/21 0230   100   --   --   --   --    04/02/21 0215   98   --   --   --   --    04/02/21 0207   99   --   --   --   --    04/02/21 0200   99   --   --   --   --    04/02/21 0145   98   --   --   --   --    04/02/21 0130   100   --   --   --   --    04/02/21 0125   100   --   --   --   --    04/02/21 0115   99   --   --   --   --    04/02/21 0110   99   --   --   --   --    04/02/21 00:57:06   98   ventilator   --   --   --    04/02/21 0037   99   ventilator   --   --   --    04/02/21 0000   97   ventilator   --   100   --    04/01/21 23:58:02   94   ventilator   --   --   --    04/01/21 2327   (!) 87   partial rebreather mask   15   --   --            Lines, Drains & Airways    Active LDAs     Name:   Placement date:   Placement time:   Site:   Days:    Peripheral IV 04/01/21 2330 Left;Posterior Hand   04/01/21 2330    Hand   less than 1    Peripheral IV 04/01/21 2330 Posterior;Right Hand   04/01/21    2330    Hand   less than 1    Peripheral IV 04/02/21 0016 Left Forearm   04/02/21    0016    Forearm   less than 1    NG/OG Tube Orogastric Center mouth   04/02/21    1034    Center mouth   less than 1    ETT    04/01/21 2337     less than 1         Inactive LDAs     None                  Facility-Administered Medications as of 4/2/2021   Medication Dose Route Frequency Provider Last Rate Last Admin   • atropine sulfate injection 1 mg  1 mg Intravenous Once Mikhail Schmitz MD   Stopped at 04/02/21 0035   • chlorhexidine (PERIDEX) 0.12 % solution 15 mL  15 mL Mouth/Throat Q12H Jeffrey Yan MD       • clopidogrel (PLAVIX) tablet 75 mg  75 mg Oral Daily Angelica Hunt MD   75 mg at 04/02/21 1035   • enoxaparin (LOVENOX) syringe 40 mg  40 mg Subcutaneous Q24H Jeffrey Yan MD       • [COMPLETED] etomidate (AMIDATE) injection 30 mg  30 mg Intravenous Once Nadir  Mikhail GRAMAJO MD   30 mg at 04/01/21 2334   • famotidine (PEPCID) tablet 20 mg  20 mg Nasogastric BID AC Jeffrey Yan MD       • fentaNYL citrate (PF) (SUBLIMAZE) injection 25 mcg  25 mcg Intravenous Q1H PRN Jeffrey Yan MD       • [COMPLETED] furosemide (LASIX) injection 40 mg  40 mg Intravenous Once Mikhail Schmitz MD   40 mg at 04/02/21 0002   • [COMPLETED] furosemide (LASIX) injection 40 mg  40 mg Intravenous Once Jeffrey Yan MD   40 mg at 04/02/21 1337   • furosemide (LASIX) injection 40 mg  40 mg Intravenous Q12H Tasha Dixon MD       • [COMPLETED] midazolam (VERSED) injection 2 mg  2 mg Intravenous Once Mikhail Schmitz MD   2 mg at 04/02/21 0017   • [COMPLETED] midazolam (VERSED) injection 4 mg  4 mg Intravenous Once Mikhail Schmitz MD   4 mg at 04/02/21 0033   • [COMPLETED] midazolam (VERSED) injection 4 mg  4 mg Intravenous Once Mikhail Schmitz MD   4 mg at 04/02/21 0054   • norepinephrine (LEVOPHED) 8 mg in 250 mL NS infusion (premix)  0.02-0.3 mcg/kg/min Intravenous Titrated Mikhail Schmitz MD 8.48 mL/hr at 04/02/21 0924 0.04 mcg/kg/min at 04/02/21 0924   • propofol (DIPRIVAN) infusion 10 mg/mL 100 mL  5-50 mcg/kg/min Intravenous Titrated Mikhail Schmitz MD 23.7 mL/hr at 04/02/21 1336 35 mcg/kg/min at 04/02/21 1336   • [COMPLETED] succinylcholine (ANECTINE) injection 200 mg  200 mg Intravenous Once Mikhail Schmitz MD   200 mg at 04/01/21 2335     Orders (last 72 hrs)      Start     Ordered    04/03/21 0600  XR Chest 1 View  Daily      04/02/21 1107    04/03/21 0600  Procalcitonin  Morning Draw      04/02/21 1107    04/02/21 2100  chlorhexidine (PERIDEX) 0.12 % solution 15 mL  Every 12 Hours Scheduled      04/02/21 1431    04/02/21 1730  famotidine (PEPCID) tablet 20 mg  2 Times Daily Before Meals      04/02/21 1429    04/02/21 1600  Oral Care & Teeth Brushing  Every 4 Hours     Comments: Cory Teeth at Least 2x/day    04/02/21 1431    04/02/21 1515  furosemide (LASIX) injection 40  mg  Every 12 Hours      04/02/21 1423    04/02/21 1515  enoxaparin (LOVENOX) syringe 40 mg  Every 24 Hours      04/02/21 1429    04/02/21 1437  Discontinue Patient Isolation  Once      04/02/21 1437    04/02/21 1432  Spontaneous Awakening Trial  Daily      04/02/21 1431    04/02/21 1431  Elevate HOB  Continuous      04/02/21 1431    04/02/21 1431  RN May Place Order For ABG As Needed for Respiratory Distress  Continuous      04/02/21 1431    04/02/21 1429  fentaNYL citrate (PF) (SUBLIMAZE) injection 25 mcg  Every 1 Hour PRN      04/02/21 1429    04/02/21 1424  Adult Transthoracic Echo Complete W/ Cont if Necessary Per Protocol  Once      04/02/21 1423    04/02/21 1334  ECG 12 Lead  Once      04/02/21 1333    04/02/21 1332  Troponin  Once      04/02/21 1331    04/02/21 1200  furosemide (LASIX) injection 40 mg  Once      04/02/21 1106    04/02/21 1154  Blood Gas, Arterial -  Once      04/02/21 1139    04/02/21 1108  Blood Gas, Arterial -  Daily      04/02/21 1107    04/02/21 1027  XR Abdomen KUB  1 Time Imaging      04/02/21 1026    04/02/21 0935  Restraints Non-Violent or Non-Self Destructive  Calendar Day      04/02/21 0935    04/02/21 0900  clopidogrel (PLAVIX) tablet 75 mg  Daily      04/02/21 0357    04/02/21 0825  Inpatient Cardiology Consult  Once     Specialty:  Cardiology  Provider:  James East MD    04/02/21 0826    04/02/21 0600  CBC & Differential  Daily      04/02/21 0359    04/02/21 0600  Basic Metabolic Panel  Daily      04/02/21 0359    04/02/21 0600  XR Chest 1 View  1 Time Imaging      04/02/21 0359    04/02/21 0600  CBC Auto Differential  PROCEDURE ONCE      04/02/21 0359    04/02/21 0300  Restraints Non-Violent or Non-Self Destructive  Calendar Day,   Status:  Canceled      04/02/21 0338    04/02/21 0127  POC Glucose Once  Once      04/02/21 0124    04/02/21 0053  midazolam (VERSED) injection 4 mg  Once      04/02/21 0051    04/02/21 0051  Blood Gas, Arterial -  Once      04/02/21 0043     04/02/21 0036  atropine sulfate injection 1 mg  Once      04/02/21 0035    04/02/21 0036  Blood Gas, Arterial -  Once      04/02/21 0036    04/02/21 0032  midazolam (VERSED) injection 4 mg  Once      04/02/21 0030    04/02/21 0027  Inpatient Admission  Once      04/02/21 0026    04/02/21 0027  Cardiac Monitoring  Once      04/02/21 0026    04/02/21 0017  Ventilator - AC/VC  Continuous      04/02/21 0016    04/02/21 0005  midazolam (VERSED) injection 2 mg  Once      04/02/21 0003    04/02/21 0005  norepinephrine (LEVOPHED) 8 mg in 250 mL NS infusion (premix)  Titrated      04/02/21 0003    04/02/21 0005  Pulmonology (on-call MD unless specified)  Once     Specialty:  Pulmonary Disease  Provider:  (Not yet assigned)    04/02/21 0004    04/02/21 0005  Blood Gas, Arterial -  Once      04/01/21 2358    04/02/21 0004  Insert 2nd peripheral IV  Once      04/02/21 0004    04/01/21 2355  furosemide (LASIX) injection 40 mg  Once      04/01/21 2353    04/01/21 2355  Intubation  Once     Comments: This order was created via procedure documentation    04/01/21 2354    04/01/21 2354  Salicylate Level  Once      04/01/21 2353    04/01/21 2353  TSH  STAT      04/01/21 2352    04/01/21 2353  Acetaminophen Level  Once      04/01/21 2352    04/01/21 2352  BNP  Once      04/01/21 2352    04/01/21 2352  D-dimer, Quantitative  Once      04/01/21 2352    04/01/21 2352  Ethanol  Once      04/01/21 2352    04/01/21 2352  Magnesium  STAT      04/01/21 2352    04/01/21 2352  Procalcitonin  Once      04/01/21 2352    04/01/21 2350  Manual Differential  Once      04/01/21 2349    04/01/21 2344  Magnesium  STAT,   Status:  Canceled      04/01/21 2343    04/01/21 2344  TSH  STAT,   Status:  Canceled      04/01/21 2343    04/01/21 2343  propofol (DIPRIVAN) infusion 10 mg/mL 100 mL  Titrated      04/01/21 2341    04/01/21 2343  Insert Indwelling Urinary Catheter  Once      04/01/21 2342    04/01/21 2343  Assess Need for Indwelling Urinary Catheter  - Follow Removal Protocol  Continuous     Comments: Indwelling Urinary Catheter Removal Criteria  Discontinue Indwelling Urinary Catheter Unless One of the Following is Present:  Urinary Retention or Obstruction  Chronic Urinary Catheter Use  End of Life  Critical Illness with Strict I/O   Tract or Abdominal Surgery  Stage 3/4 Sacral / Perineal Wound  Required Activity Restriction: Trauma  Required Activity Restriction: Spine Surgery  If Patient is Being Followed by Urology Contact Them PRIOR to Removal  Do Not Remove Indwelling Urinary Catheter Order is Present with a CLINICAL REASON to Maintain the Catheter. Provider is Required to Include a Clinical Reason to Maintain a Urinary Catheter    Patient Admitted With Indwelling Urinary Catheter (Not Placed at [facility])  Assess for Continued Need & Document Medical Necessity  If Infection is Suspected, Contact the Provider        04/01/21 2342    04/01/21 2343  Urinary Catheter Care  Every Shift      04/01/21 2342 04/01/21 2343  Urine Drug Screen - Urine, Clean Catch  Once      04/01/21 2342    04/01/21 2343  Ethanol  Once,   Status:  Canceled      04/01/21 2342    04/01/21 2343  Acetaminophen Level  Once,   Status:  Canceled      04/01/21 2342    04/01/21 2343  Salicylate Level  Once,   Status:  Canceled      04/01/21 2342    04/01/21 2343  ECG 12 Lead  STAT      04/01/21 2342    04/01/21 2342  Respiratory Panel PCR w/COVID-19(SARS-CoV-2) RICH/MAGUI/RAFFAELE/PAD/COR/MAD/CAMPBELL In-House, NP Swab in UTM/VTM, 3-4 HR TAT - Swab, Nasopharynx  STAT      04/01/21 2341    04/01/21 2342  Comprehensive Metabolic Panel  Once      04/01/21 2341    04/01/21 2342  CBC & Differential  Once      04/01/21 2341    04/01/21 2342  Protime-INR  Once      04/01/21 2341    04/01/21 2342  CBC Auto Differential  PROCEDURE ONCE      04/01/21 2341    04/01/21 2342  BNP  Once,   Status:  Canceled      04/01/21 2342    04/01/21 2342  Troponin  Now Then Every 2 Hours      04/01/21 2342     "04/01/21 2342  D-dimer, Quantitative  Once,   Status:  Canceled      04/01/21 2342    04/01/21 2342  Blood Culture - Blood, Arm, Right  Once      04/01/21 2342    04/01/21 2342  Blood Culture - Blood, Arm, Left  Once      04/01/21 2342    04/01/21 2342  Procalcitonin  Once,   Status:  Canceled      04/01/21 2342    04/01/21 2342  Lactic Acid, Plasma  Once      04/01/21 2342    04/01/21 2342  Urinalysis With Microscopic If Indicated (No Culture) - Urine, Catheter  Once      04/01/21 2342    04/01/21 2340  ECG 12 Lead  Once      04/01/21 2339    04/01/21 2339  XR Chest 1 View  1 Time Imaging      04/01/21 2338    04/01/21 2339  Blood Gas, Arterial -  Once      04/01/21 2339    04/01/21 2336  etomidate (AMIDATE) injection 30 mg  Once      04/01/21 2334    04/01/21 2336  succinylcholine (ANECTINE) injection 200 mg  Once      04/01/21 2334    Unscheduled  Subglottic Suctioning Must Be Done Every 6 Hours  As Needed      04/02/21 1431    --  SCANNED EKG      04/01/21 0000                   Physician Progress Notes       Jeffrey Yan MD at 04/02/21 0843            Daily Progress Note.   [facility] INTENSIVE CARE  4/2/2021    Patient:  Name:  Harvinder Vega  MRN:  [id number]  1960  61 y.o.  male         CC:  soa  Interval History:  Remains intubated on vasopressors  No acute events  Oxygen being weaned.  Diuresis with cxr  Otherwise history unobtainable due to lethargy.    Physical Exam:  BP (!) 77/52   Pulse 61   Temp 96.8 °F (36 °C) (Tympanic)   Resp 24   Ht 182.9 cm (72\")   Wt 113 kg (250 lb)   SpO2 96%   BMI 33.91 kg/m²   Body mass index is 33.91 kg/m².    Intake/Output Summary (Last 24 hours) at 4/2/2021 1016  Last data filed at 4/2/2021 0500  Gross per 24 hour   Intake 301 ml   Output --   Net 301 ml   Vent Settings          Resp Rate (Set): 24     FiO2 (%): 50 %  PEEP/CPAP (cm H2O): 12 cm H20    Minute Ventilation (L/min) (Obs): 13.3 L/min  Resp Rate (Observed) Vent: 24     I:E Ratio " (Obs): 1:2.30    PIP Observed (cm H2O): 34 cm H2O  Plateau Pressure (cm H2O): 0 cm H2O    General appearance: non toxic on mech vent  Eyes: anicteric sclerae, moist conjunctivae  HENT: Atraumatic; +ET tube  Neck: Trachea midline;  Lungs: mech air entry bilaterally no wheeze no rhonchi +,mechanical  CV: RRR, no rub  Abdomen: Soft, non-tender; no masses or HSM +obese  Extremities: No peripheral edema or extremity lymphadenopathy  Skin: cool to touch distal ble   Psych/neuro: sedated limiting exam    Data Review:  Notable Labs:  Results from last 7 days   Lab Units 04/02/21  0508 04/01/21  2342   WBC 10*3/mm3 19.94* 15.66*   HEMOGLOBIN g/dL 15.2 16.1   PLATELETS 10*3/mm3 184 205     Results from last 7 days   Lab Units 04/02/21  0508 04/01/21  2342   SODIUM mmol/L 138 139   POTASSIUM mmol/L 4.1 4.0   CHLORIDE mmol/L 105 104   CO2 mmol/L 21.1* 23.1   BUN mg/dL 17 15   CREATININE mg/dL 1.04 0.91   GLUCOSE mg/dL 110* 195*   CALCIUM mg/dL 8.8 8.5*   MAGNESIUM mg/dL  --  2.1   Estimated Creatinine Clearance: 94.4 mL/min (by C-G formula based on SCr of 1.04 mg/dL).    Results from last 7 days   Lab Units 04/02/21  0508 04/02/21  0010 04/01/21  2342   AST (SGOT) U/L  --   --  19   ALT (SGPT) U/L  --   --  21   PROCALCITONIN ng/mL  --   --  0.05   LACTATE mmol/L  --  1.7  --    D DIMER QUANT MCGFEU/mL  --   --  1.09*   PLATELETS 10*3/mm3 184  --  205       Results from last 7 days   Lab Units 04/02/21  0043 04/01/21  2358   PH, ARTERIAL pH units 7.262* 7.189*   PCO2, ARTERIAL mm Hg 51.0* 66.4*   PO2 ART mm Hg 108.2* 81.7   HCO3 ART mmol/L 22.9 25.3       Imaging:  Reviewed chest images personally from past 3 days    Scheduled meds:    atropine, 1 mg, Intravenous, Once  clopidogrel, 75 mg, Oral, Daily        ASSESSMENT  /  PLAN:  1. Acute pulmonary edema secondary to hypertensive emergency in mitral valve regurgitation moderate to severe  2. Hypertensive emergency on presentation  3. Hypotension - vasopressors  4. Acute  hypercapnic and hypoxic respiratory failure  5. Moderate to severe MR  6. CAD s/p CABG in 2020  7. Partial RBBB  8. nstemi.        · ABG vent reviewed adjusted  · Wean vasopressors as able  · Propofol.  Titrate to keep YOBANY -1-0. PRN fentanyl  · Cont lasix, send procal  · Mvr, trop elevation, cad, abnormal ecg --> consult to cards  · Hold statin in acute illness  · Hold carvedilol due to hypotension  · Cont Plavix  · DVT prophylaxis with Lovenox    Total critical care time was 40 minutes, excluding any separately billable procedure time.  Time did not overlap with any other provider.      Jeffrey Yan MD  [facility]  21  1102a EDT          Electronically signed by Jeffrey Yan MD at 21 1103          Consult Notes       Tasha Dixon MD at 21 1231              Patient Name: Harvinder Vega  :1960  61 y.o.    Date of Admission: 2021  Date of Consultation:  21  Encounter Provider: Tasha Dixon MD  Place of Service: [facility] CARDIOLOGY  Referring Provider: Angelica Hunt MD  Patient Care Team:  Bruno Ricci MD as PCP - General (Internal Medicine)      Chief complaint: Shortness of breath     History of Present Illness:    Harvinder Vega is a 61-year-old male patient of Dr. East with history of non-ST elevation myocardial infarction complicated by cardiogenic shock 2020, treated in [facility].  He was found at that time to an occluded RCA with collateralization but diffuse three-vessel coronary disease.  He underwent CABG x3 and a cardiac rehabilitation at [facility].  His EF following CABG was 30%, repeat echo done 2021 showed ejection fraction 63% with moderate to severe mitral insufficiency.    He presented to the emergency department on 2021 with abrupt onset short windedness, 2 hours prior to presentation.  This was associated with diaphoresis.  His  blood pressure was 210/132, rest kennedy rate 28, heart rate 99, saturation 87% on 50 L of partial rebreather mask.  He had impending respiratory failure and was intubated, started on IV propofol given IV furosemide.  He then developed hypotension, propofol was temporarily stopped and norepinephrine started.  His troponin was normal and is now 0.112, white blood cell count 20, lactate 1.7, procalcitonin 0.05, D-dimer 1.09, his proBNP is normal.  ECG shows normal sinus rhythm with right bundle branch block.  Inferior Q waves also noted.  This is unchanged from ECG done 1/27/2021.  Chest x-ray shows borderline pulmonary vascular congestion with bilateral diffuse patchy airspace disease.    He is diuresed quite a bit and his breathing is much better.  His chest x-ray looks much better.  He is awake talking over the vent or trying to.  He denies chest pain, dizziness or nausea.  He does say that his breathing is better.    Previous Testing-  Echo 2/9/2021  · Calculated left ventricular EF = 63% Estimated left ventricular EF = 63% Estimated left ventricular EF was in agreement with the calculated left ventricular EF. Left ventricular systolic function is normal. Normal left ventricular cavity size and wall thickness noted. All left ventricular wall segments contract normally. Left ventricular diastolic function was indeterminate.  · Mild aortic valve regurgitation is present  · Moderate to severe mitral valve regurgitation is present.  · Trace tricuspid valve regurgitation is present. Insufficient TR velocity profile to estimate the right ventricular systolic pressure.  · Mild dilation of the sinuses of Valsalva is present. Sinus of Valsalva = 4.1 cm         Past Medical History:   Diagnosis Date   • Arthritis    • Claustrophobia    • Coronary artery disease    • Herniated intervertebral disc of lumbar spine    • Hyperlipidemia    • Low back pain        Past Surgical History:   Procedure Laterality Date   • CORONARY ARTERY  BYPASS GRAFT     • GANGLION CYST EXCISION Right     ANKLE   • KNEE SURGERY Right    • LUMBAR EPIDURAL INJECTION     • LUMBAR LAMINECTOMY DISCECTOMY DECOMPRESSION Left 2017    Procedure: Left L4-5, L5-S1 laminectomy;  Surgeon: Galindo Hernandes MD;  Location: [facility] OR;  Service:    • ROTATOR CUFF REPAIR Right    • SHOULDER SURGERY Right    • TONSILECTOMY, ADENOIDECTOMY, BILATERAL MYRINGOTOMY AND TUBES               Prior to Admission medications    Medication Sig Start Date End Date Taking? Authorizing Provider   atorvastatin (LIPITOR) 80 MG tablet Take 1 tablet by mouth Daily. 20   Paula Guerrero MD   carvedilol (COREG) 12.5 MG tablet Take 0.05 tablets by mouth 2 (two) times a day. 20   Paula Guerrero MD   clopidogrel (PLAVIX) 75 MG tablet Take 75 mg by mouth Daily. 20   Paula Guerrero MD   etodolac (LODINE) 500 MG tablet Take 1 tablet by mouth 2 (Two) Times a Day. 10/17/17   Galindo Hernandes MD   gabapentin (NEURONTIN) 300 MG capsule TAKE 1 CAPSULE BY MOUTH TWICE DAILY 19   Galindo Hernandes MD   LYRICA 75 MG capsule TK ONE C PO  BID 10/14/19   Paula Guerrero MD   nabumetone (RELAFEN) 500 MG tablet Take 500 mg by mouth 2 (Two) Times a Day. 19   Paula Guerrero MD   naproxen sodium (ALEVE) 220 MG tablet Take 220 mg by mouth As Needed for mild pain (1-3).    Paula Guerrero MD   pregabalin (LYRICA) 150 MG capsule Take 1 capsule by mouth 2 (Two) Times a Day. 1/10/21   Paula Guerrero MD       No Known Allergies    Social History     Socioeconomic History   • Marital status:      Spouse name: Not on file   • Number of children: Not on file   • Years of education: Not on file   • Highest education level: Not on file   Tobacco Use   • Smoking status: Former Smoker     Packs/day: 1.50     Years: 30.00     Pack years: 45.00     Types: Cigarettes     Quit date: 2020     Years since quittin.3   • Smokeless  tobacco: Never Used   Substance and Sexual Activity   • Alcohol use: Not Currently   • Drug use: Defer   • Sexual activity: Yes     Partners: Female       Family History   Problem Relation Age of Onset   • Heart failure Mother    • Heart failure Father    • Diabetes Maternal Grandfather    • Malig Hyperthermia Neg Hx        REVIEW OF SYSTEMS:   All systems reviewed.  Pertinent positives identified in HPI.  All other systems are negative.      Objective:     Vitals:    04/02/21 1000 04/02/21 1100 04/02/21 1109 04/02/21 1200   BP: 117/66 103/64  93/61   BP Location:       Patient Position:       Pulse: 61 63 64 64   Resp:   24    Temp:  98.6 °F (37 °C)     TempSrc:  Oral     SpO2: 100% 99% 98% 98%   Weight:       Height:         Body mass index is 33.91 kg/m².    General Appearance:    Alert, cooperative, in no acute distress   Head:    Normocephalic, without obvious abnormality, atraumatic   Eyes:            Lids and lashes normal, conjunctivae and sclerae normal, no icterus, no pallor, corneas clear   Ears:    Ears appear intact with no abnormalities noted   Throat:   No oral lesions, no thrush, oral mucosa moist   Neck:   No adenopathy, supple, trachea midline, no thyromegaly, no carotid bruit, no JVD   Back:     No kyphosis present, no scoliosis present, no skin lesions, erythema or scars, no tenderness to palpation, range of motion normal   Lungs:     Clear to auscultation, respirations regular, even and unlabored    Heart:    Regular rhythm and normal rate, normal S1 and S2, no murmur, no gallop, no rub, no click   Chest Wall:    No abnormalities observed   Abdomen:     Normal bowel sounds, no masses, no organomegaly, soft, nontender, nondistended, no guarding, no rebound  tenderness   Extremities:   Moves all extremities well, no edema, no cyanosis, no redness   Pulses:   Pulses palpable and equal bilaterally. Normal radial, carotid, dorsalis pedis and posterior tibial pulses bilaterally.    Skin:  Psychiatric:    No bleeding, bruising or rash    Alert and oriented x 3, normal mood and affect   Lab Review:     Results from last 7 days   Lab Units 04/02/21  0508 04/01/21  2342   SODIUM mmol/L 138 139   POTASSIUM mmol/L 4.1 4.0   CHLORIDE mmol/L 105 104   CO2 mmol/L 21.1* 23.1   BUN mg/dL 17 15   CREATININE mg/dL 1.04 0.91   CALCIUM mg/dL 8.8 8.5*   BILIRUBIN mg/dL  --  0.5   ALK PHOS U/L  --  106   ALT (SGPT) U/L  --  21   AST (SGOT) U/L  --  19   GLUCOSE mg/dL 110* 195*     Results from last 7 days   Lab Units 04/02/21  0508 04/01/21  2342   TROPONIN T ng/mL 0.112* <0.010     Results from last 7 days   Lab Units 04/02/21  0508   WBC 10*3/mm3 19.94*   HEMOGLOBIN g/dL 15.2   HEMATOCRIT % 46.2   PLATELETS 10*3/mm3 184     Results from last 7 days   Lab Units 04/01/21  2342   INR  1.10     Results from last 7 days   Lab Units 04/01/21  2342   MAGNESIUM mg/dL 2.1                 EKG 4/1/2021       Prior EKG 1/27/2021       I personally viewed and interpreted the patient's EKG/Telemetry data.        Assessment and Plan:       1. Acute hypoxic respiratory failure, likely due to pulmonary edema secondary to hypertensive emergency with moderate severe mitral deficiency.  Orally intubated.    2. Hypertensive emergency on presentation, now hypotensive, TSH and magnesium are normal.  3. CAD, CABG November 2020, troponin elevated  4. RBBB    We will check an echocardiogram for ejection fraction and another troponin.  We will continue to dose Lasix.  Likely switch to oral Lasix in the morning, watch labs.    Tasha Dixon MD  04/02/21  13:24 EDT                Electronically signed by Tasha Dixon MD at 04/02/21 8137

## 2021-04-02 NOTE — CONSULTS
Patient Name: Harvinder Vega  :1960  61 y.o.    Date of Admission: 2021  Date of Consultation:  21  Encounter Provider: Tasha Dixon MD  Place of Service: [facility] CARDIOLOGY  Referring Provider: Angelica Hunt MD  Patient Care Team:  Bruno Ricci MD as PCP - General (Internal Medicine)      Chief complaint: Shortness of breath     History of Present Illness:    Harvinder Vega is a 61-year-old male patient of Dr. East with history of non-ST elevation myocardial infarction complicated by cardiogenic shock 2020, treated in [facility].  He was found at that time to an occluded RCA with collateralization but diffuse three-vessel coronary disease.  He underwent CABG x3 and a cardiac rehabilitation at [facility].  His EF following CABG was 30%, repeat echo done 2021 showed ejection fraction 63% with moderate to severe mitral insufficiency.    He presented to the emergency department on 2021 with abrupt onset short windedness, 2 hours prior to presentation.  This was associated with diaphoresis.  His blood pressure was 210/132, rest kennedy rate 28, heart rate 99, saturation 87% on 50 L of partial rebreather mask.  He had impending respiratory failure and was intubated, started on IV propofol given IV furosemide.  He then developed hypotension, propofol was temporarily stopped and norepinephrine started.  His troponin was normal and is now 0.112, white blood cell count 20, lactate 1.7, procalcitonin 0.05, D-dimer 1.09, his proBNP is normal.  ECG shows normal sinus rhythm with right bundle branch block.  Inferior Q waves also noted.  This is unchanged from ECG done 2021.  Chest x-ray shows borderline pulmonary vascular congestion with bilateral diffuse patchy airspace disease.    He is diuresed quite a bit and his breathing is much better.  His chest x-ray looks much better.  He is awake talking over the vent or  trying to.  He denies chest pain, dizziness or nausea.  He does say that his breathing is better.    Previous Testing-  Echo 2/9/2021  · Calculated left ventricular EF = 63% Estimated left ventricular EF = 63% Estimated left ventricular EF was in agreement with the calculated left ventricular EF. Left ventricular systolic function is normal. Normal left ventricular cavity size and wall thickness noted. All left ventricular wall segments contract normally. Left ventricular diastolic function was indeterminate.  · Mild aortic valve regurgitation is present  · Moderate to severe mitral valve regurgitation is present.  · Trace tricuspid valve regurgitation is present. Insufficient TR velocity profile to estimate the right ventricular systolic pressure.  · Mild dilation of the sinuses of Valsalva is present. Sinus of Valsalva = 4.1 cm         Past Medical History:   Diagnosis Date   • Arthritis    • Claustrophobia    • Coronary artery disease    • Herniated intervertebral disc of lumbar spine    • Hyperlipidemia    • Low back pain        Past Surgical History:   Procedure Laterality Date   • CORONARY ARTERY BYPASS GRAFT     • GANGLION CYST EXCISION Right     ANKLE   • KNEE SURGERY Right 2013   • LUMBAR EPIDURAL INJECTION     • LUMBAR LAMINECTOMY DISCECTOMY DECOMPRESSION Left 8/31/2017    Procedure: Left L4-5, L5-S1 laminectomy;  Surgeon: Galindo Hernandes MD;  Location: [facility];  Service:    • ROTATOR CUFF REPAIR Right    • SHOULDER SURGERY Right 2013   • TONSILECTOMY, ADENOIDECTOMY, BILATERAL MYRINGOTOMY AND TUBES      1962         Prior to Admission medications    Medication Sig Start Date End Date Taking? Authorizing Provider   atorvastatin (LIPITOR) 80 MG tablet Take 1 tablet by mouth Daily. 11/24/20   Paula Guerrero MD   carvedilol (COREG) 12.5 MG tablet Take 0.05 tablets by mouth 2 (two) times a day. 11/24/20   Paula Guerrero MD   clopidogrel (PLAVIX) 75 MG tablet Take 75 mg by mouth Daily.  20   Paula Guerrero MD   etodolac (LODINE) 500 MG tablet Take 1 tablet by mouth 2 (Two) Times a Day. 10/17/17   Galindo Hernandes MD   gabapentin (NEURONTIN) 300 MG capsule TAKE 1 CAPSULE BY MOUTH TWICE DAILY 19   Galindo Hernandes MD   LYRICA 75 MG capsule TK ONE C PO  BID 10/14/19   Paula Guerrero MD   nabumetone (RELAFEN) 500 MG tablet Take 500 mg by mouth 2 (Two) Times a Day. 19   Paula Guerrero MD   naproxen sodium (ALEVE) 220 MG tablet Take 220 mg by mouth As Needed for mild pain (1-3).    Paula Guerrero MD   pregabalin (LYRICA) 150 MG capsule Take 1 capsule by mouth 2 (Two) Times a Day. 1/10/21   Paula Guerrero MD       No Known Allergies    Social History     Socioeconomic History   • Marital status:      Spouse name: Not on file   • Number of children: Not on file   • Years of education: Not on file   • Highest education level: Not on file   Tobacco Use   • Smoking status: Former Smoker     Packs/day: 1.50     Years: 30.00     Pack years: 45.00     Types: Cigarettes     Quit date: 2020     Years since quittin.3   • Smokeless tobacco: Never Used   Substance and Sexual Activity   • Alcohol use: Not Currently   • Drug use: Defer   • Sexual activity: Yes     Partners: Female       Family History   Problem Relation Age of Onset   • Heart failure Mother    • Heart failure Father    • Diabetes Maternal Grandfather    • Malig Hyperthermia Neg Hx        REVIEW OF SYSTEMS:   All systems reviewed.  Pertinent positives identified in HPI.  All other systems are negative.      Objective:     Vitals:    21 1000 21 1100 21 1109 21 1200   BP: 117/66 103/64  93/61   BP Location:       Patient Position:       Pulse: 61 63 64 64   Resp:   24    Temp:  98.6 °F (37 °C)     TempSrc:  Oral     SpO2: 100% 99% 98% 98%   Weight:       Height:         Body mass index is 33.91 kg/m².    General Appearance:    Alert, cooperative, in no acute  distress   Head:    Normocephalic, without obvious abnormality, atraumatic   Eyes:            Lids and lashes normal, conjunctivae and sclerae normal, no icterus, no pallor, corneas clear   Ears:    Ears appear intact with no abnormalities noted   Throat:   No oral lesions, no thrush, oral mucosa moist   Neck:   No adenopathy, supple, trachea midline, no thyromegaly, no carotid bruit, no JVD   Back:     No kyphosis present, no scoliosis present, no skin lesions, erythema or scars, no tenderness to palpation, range of motion normal   Lungs:     Clear to auscultation, respirations regular, even and unlabored    Heart:    Regular rhythm and normal rate, normal S1 and S2, no murmur, no gallop, no rub, no click   Chest Wall:    No abnormalities observed   Abdomen:     Normal bowel sounds, no masses, no organomegaly, soft, nontender, nondistended, no guarding, no rebound  tenderness   Extremities:   Moves all extremities well, no edema, no cyanosis, no redness   Pulses:   Pulses palpable and equal bilaterally. Normal radial, carotid, dorsalis pedis and posterior tibial pulses bilaterally.    Skin:  Psychiatric:   No bleeding, bruising or rash    Alert and oriented x 3, normal mood and affect   Lab Review:     Results from last 7 days   Lab Units 04/02/21  0508 04/01/21  2342   SODIUM mmol/L 138 139   POTASSIUM mmol/L 4.1 4.0   CHLORIDE mmol/L 105 104   CO2 mmol/L 21.1* 23.1   BUN mg/dL 17 15   CREATININE mg/dL 1.04 0.91   CALCIUM mg/dL 8.8 8.5*   BILIRUBIN mg/dL  --  0.5   ALK PHOS U/L  --  106   ALT (SGPT) U/L  --  21   AST (SGOT) U/L  --  19   GLUCOSE mg/dL 110* 195*     Results from last 7 days   Lab Units 04/02/21  0508 04/01/21  2342   TROPONIN T ng/mL 0.112* <0.010     Results from last 7 days   Lab Units 04/02/21  0508   WBC 10*3/mm3 19.94*   HEMOGLOBIN g/dL 15.2   HEMATOCRIT % 46.2   PLATELETS 10*3/mm3 184     Results from last 7 days   Lab Units 04/01/21  2342   INR  1.10     Results from last 7 days   Lab Units  04/01/21  2342   MAGNESIUM mg/dL 2.1                 EKG 4/1/2021       Prior EKG 1/27/2021       I personally viewed and interpreted the patient's EKG/Telemetry data.        Assessment and Plan:       1. Acute hypoxic respiratory failure, likely due to pulmonary edema secondary to hypertensive emergency with moderate severe mitral deficiency.  Orally intubated.    2. Hypertensive emergency on presentation, now hypotensive, TSH and magnesium are normal.  3. CAD, CABG November 2020, troponin elevated  4. RBBB    We will check an echocardiogram for ejection fraction and another troponin.  We will continue to dose Lasix.  Likely switch to oral Lasix in the morning, watch labs.    Tasha Dixon MD  04/02/21  13:24 EDT

## 2021-04-02 NOTE — PLAN OF CARE
Goal Outcome Evaluation:      Pt remains in ICU on vent. Levo @ .02. 850mL urine out. Pt nods appropriately to all questions.

## 2021-04-02 NOTE — PLAN OF CARE
Goal Outcome Evaluation:  Plan of Care Reviewed With: patient  Progress: no change  Outcome Summary: Pt remains in ICU on vent. Propofol and Levo infusing. VSS. Follows commands & nods appropriately with sedation off. Will continue to monitor.

## 2021-04-02 NOTE — ED PROVIDER NOTES
EMERGENCY DEPARTMENT ENCOUNTER    Room Number:  24/24  Date of encounter:  4/2/2021  PCP: Bruno Ricci MD  Historian: Patient    Patient was placed in face mask during triage process. Patient was wearing facemask when I entered the room and throughout our encounter. I wore full protective equipment throughout this patient encounter including a face mask, eye protection, and gloves. Hand hygiene was performed before donning protective equipment and again following doffing of PPE after leaving the room.    HPI:  Chief Complaint: Dyspnea  A complete HPI/ROS/PMH/PSH/SH/FH are unobtainable due to: N/A   Context: Harvinder Vega is a 61 y.o. male who presents to the ED c/o abrupt onset dyspnea 1.5 hours prior to arrival in the emergency department.  Patient notes diaphoresis but no nausea or chest pain.  He was at rest at time of onset.  He felt at baseline earlier in the day has had no recent cough or fever.  Patient does have a history of quadruple bypass in November 2020.  Is not been having any chest pain lately.  Former smoker quit at the time of his bypass.      MEDICAL HISTORY REVIEW  CAD, hyperlipidemia, hypertension.  Former smoker.    Cardiology note 1/27/2021-Dr. East  On November 14 the patient experienced a non-ST elevation myocardial infarction was complicated by cardiogenic shock.  He was found to have an occluded right coronary artery with collateralization but diffuse three-vessel coronary disease and left ventricular dysfunction with an ejection fraction of approximately 30 to 35%.  He underwent three-vessel grafting with an internal mammary graft to the LAD.  In individual saphenous vein grafts to the diagonal branch of the LAD, obtuse marginal branch of the circumflex and posterior descending coronary artery of the right coronary artery.       Cardiology note:  PAST MEDICAL HISTORY  Active Ambulatory Problems     Diagnosis Date Noted   • Spinal stenosis of lumbar region 08/09/2017   • Coronary  artery disease involving coronary bypass graft of native heart without angina pectoris 2021   • Mixed hyperlipidemia 2021   • Over weight 2021   • Acute ST elevation myocardial infarction (STEMI) (CMS/formerly [facility]) 2021     Resolved Ambulatory Problems     Diagnosis Date Noted   • No Resolved Ambulatory Problems     Past Medical History:   Diagnosis Date   • Arthritis    • Claustrophobia    • Coronary artery disease    • Herniated intervertebral disc of lumbar spine    • Hyperlipidemia    • Low back pain          PAST SURGICAL HISTORY  Past Surgical History:   Procedure Laterality Date   • CORONARY ARTERY BYPASS GRAFT     • GANGLION CYST EXCISION Right     ANKLE   • KNEE SURGERY Right    • LUMBAR EPIDURAL INJECTION     • LUMBAR LAMINECTOMY DISCECTOMY DECOMPRESSION Left 2017    Procedure: Left L4-5, L5-S1 laminectomy;  Surgeon: Galindo Hernandes MD;  Location: [facility];  Service:    • ROTATOR CUFF REPAIR Right    • SHOULDER SURGERY Right    • TONSILECTOMY, ADENOIDECTOMY, BILATERAL MYRINGOTOMY AND TUBES               FAMILY HISTORY  Family History   Problem Relation Age of Onset   • Heart failure Mother    • Heart failure Father    • Diabetes Maternal Grandfather    • Malig Hyperthermia Neg Hx          SOCIAL HISTORY  Social History     Socioeconomic History   • Marital status:      Spouse name: Not on file   • Number of children: Not on file   • Years of education: Not on file   • Highest education level: Not on file   Tobacco Use   • Smoking status: Former Smoker     Packs/day: 1.50     Years: 30.00     Pack years: 45.00     Types: Cigarettes     Quit date: 2020     Years since quittin.3   • Smokeless tobacco: Never Used   Substance and Sexual Activity   • Alcohol use: Not Currently   • Drug use: Defer   • Sexual activity: Yes     Partners: Female         ALLERGIES  Patient has no known allergies.        REVIEW OF SYSTEMS  Review of Systems     All systems  reviewed and negative except for those discussed in HPI.       PHYSICAL EXAM    I have reviewed the triage vital signs and nursing notes.    ED Triage Vitals   Temp Heart Rate Resp BP SpO2   -- 04/01/21 2327 04/01/21 2327 04/01/21 2329 04/01/21 2327    103 24 (!) 210/132 (!) 87 %      Temp src Heart Rate Source Patient Position BP Location FiO2 (%)   -- 04/01/21 2327 04/01/21 2329 04/01/21 2329 --    Monitor Sitting Right arm        Physical Exam    Physical Exam   Constitutional: Diaphoretic, ashen, and respiratory distress.  Mentating at this time appropriately..   HENT:  Head: Normocephalic and atraumatic.   Oropharynx: Mucous membranes are moist.   Eyes: No scleral icterus. No conjunctival pallor.  Neck: Painless range of motion noted. Neck supple.   Cardiovascular: Tachycardic.  Strong pulses noted.  Pulmonary/Chest: Significant increased work of breathing.  Diminished bibasilar without crackles or wheezes.  Well-healed sternotomy.  Abdominal: Soft. There is no tenderness. There is no rebound and no guarding.   Musculoskeletal: Moves all extremities equally. There is no pedal edema or calf tenderness.   Neurological: Alert.  Baseline strength and sensation noted.   Skin: Skin is pink, warm, but very diaphoretic  Psychiatric: Mood and affect normal.   Nursing note and vitals reviewed.    LAB RESULTS  Recent Results (from the past 24 hour(s))   ECG 12 Lead    Collection Time: 04/01/21 11:27 PM   Result Value Ref Range    QT Interval 371 ms   ECG 12 Lead    Collection Time: 04/01/21 11:40 PM   Result Value Ref Range    QT Interval 415 ms   Comprehensive Metabolic Panel    Collection Time: 04/01/21 11:42 PM    Specimen: Blood   Result Value Ref Range    Glucose 195 (H) 65 - 99 mg/dL    BUN 15 8 - 23 mg/dL    Creatinine 0.91 0.76 - 1.27 mg/dL    Sodium 139 136 - 145 mmol/L    Potassium 4.0 3.5 - 5.2 mmol/L    Chloride 104 98 - 107 mmol/L    CO2 23.1 22.0 - 29.0 mmol/L    Calcium 8.5 (L) 8.6 - 10.5 mg/dL    Total  Protein 6.8 6.0 - 8.5 g/dL    Albumin 4.60 3.50 - 5.20 g/dL    ALT (SGPT) 21 1 - 41 U/L    AST (SGOT) 19 1 - 40 U/L    Alkaline Phosphatase 106 39 - 117 U/L    Total Bilirubin 0.5 0.0 - 1.2 mg/dL    eGFR Non African Amer 84 >60 mL/min/1.73    Globulin 2.2 gm/dL    A/G Ratio 2.1 g/dL    BUN/Creatinine Ratio 16.5 7.0 - 25.0    Anion Gap 11.9 5.0 - 15.0 mmol/L   Protime-INR    Collection Time: 04/01/21 11:42 PM    Specimen: Blood   Result Value Ref Range    Protime 14.0 11.7 - 14.2 Seconds    INR 1.10 0.90 - 1.10   CBC Auto Differential    Collection Time: 04/01/21 11:42 PM    Specimen: Blood   Result Value Ref Range    WBC 15.66 (H) 3.40 - 10.80 10*3/mm3    RBC 5.32 4.14 - 5.80 10*6/mm3    Hemoglobin 16.1 13.0 - 17.7 g/dL    Hematocrit 49.9 37.5 - 51.0 %    MCV 93.8 79.0 - 97.0 fL    MCH 30.3 26.6 - 33.0 pg    MCHC 32.3 31.5 - 35.7 g/dL    RDW 12.8 12.3 - 15.4 %    RDW-SD 43.9 37.0 - 54.0 fl    MPV 11.9 6.0 - 12.0 fL    Platelets 205 140 - 450 10*3/mm3    nRBC 0.0 0.0 - 0.2 /100 WBC   Troponin    Collection Time: 04/01/21 11:42 PM    Specimen: Blood   Result Value Ref Range    Troponin T <0.010 0.000 - 0.030 ng/mL   BNP    Collection Time: 04/01/21 11:42 PM    Specimen: Blood   Result Value Ref Range    proBNP 634.7 0.0 - 900.0 pg/mL   D-dimer, Quantitative    Collection Time: 04/01/21 11:42 PM    Specimen: Blood   Result Value Ref Range    D-Dimer, Quantitative 1.09 (H) 0.00 - 0.49 MCGFEU/mL   Ethanol    Collection Time: 04/01/21 11:42 PM    Specimen: Blood   Result Value Ref Range    Ethanol <10 0 - 10 mg/dL    Ethanol % <0.010 %   Magnesium    Collection Time: 04/01/21 11:42 PM    Specimen: Blood   Result Value Ref Range    Magnesium 2.1 1.6 - 2.4 mg/dL   Procalcitonin    Collection Time: 04/01/21 11:42 PM    Specimen: Blood   Result Value Ref Range    Procalcitonin 0.05 0.00 - 0.25 ng/mL   TSH    Collection Time: 04/01/21 11:42 PM    Specimen: Blood   Result Value Ref Range    TSH 1.960 0.270 - 4.200 uIU/mL    Acetaminophen Level    Collection Time: 04/01/21 11:42 PM    Specimen: Blood   Result Value Ref Range    Acetaminophen 9.0 0.0 - 30.0 mcg/mL   Salicylate Level    Collection Time: 04/01/21 11:42 PM    Specimen: Blood   Result Value Ref Range    Salicylate <0.3 <=30.0 mg/dL   Blood Gas, Arterial -    Collection Time: 04/01/21 11:58 PM    Specimen: Arterial Blood   Result Value Ref Range    Site Arterial: right radial     Orlando's Test Positive     pH, Arterial 7.189 (C) 7.350 - 7.450 pH units    pCO2, Arterial 66.4 (C) 35.0 - 45.0 mm Hg    pO2, Arterial 81.7 80.0 - 100.0 mm Hg    HCO3, Arterial 25.3 22.0 - 28.0 mmol/L    Base Excess, Arterial -4.8 (L) 0.0 - 2.0 mmol/L    O2 Saturation Calculated 92.3 92.0 - 99.0 %    A-a Gradiant 0.1 mmHg    Barometric Pressure for Blood Gas 761.7 mmHg    Modality Adult Vent     FIO2 100 %    Ventilator Mode VC     Set Tidal Volume 600     Set Mech Resp Rate 22     Rate 24 Breaths/minute    PEEP 7        Ordered the above labs and independently reviewed the results.        RADIOLOGY  XR Chest 1 View    Result Date: 4/2/2021  Patient: JAYDA GALLEGOS  Time Out: 00:17 Exam(s): FILM CXR 1 VIEW EXAM:   XR Chest, 1 View CLINICAL HISTORY:    ett placement  Reason for exam: ett placement. TECHNIQUE:   Frontal view of the chest. COMPARISON:   No relevant prior studies available. FINDINGS:   ETT terminates above the arelis.  There is borderline to mild cardiac enlargement.  Bilateral diffuse patchy airspace disease, more confluent in the right infrahilar region.  Findings suggest bilateral pneumonia with superimposed pulmonary vascular congestion.  Follow-up to resolution recommended.  Negative for pneumothorax.     Electronically signed by Michelet Miner DO on 04-02-21 at 0017      I ordered the above noted radiological studies. Reviewed by me and discussed with radiologist.  See dictation for official radiology interpretation.      PROCEDURES    Intubation    Date/Time: 4/1/2021 11:54  PM  Performed by: Mikhail Schmitz MD  Authorized by: Mikhail Schmitz MD     Consent:     Consent obtained:  Verbal    Consent given by:  Patient    Alternatives discussed:  Delayed treatment and alternative treatment  Pre-procedure details:     Patient status:  Awake    Mallampati score:  II    Pretreatment medications:  None    Paralytics:  Succinylcholine  Procedure details:     Preoxygenation:  Nonrebreather mask    Laryngoscope blade:  Mac 4    Tube size (mm):  7.5    Tube type:  Cuffed    Number of attempts:  1  Placement assessment:     ETT to lip:  26.5    Tube secured with:  Adhesive tape and ETT huynh    Breath sounds:  Equal    Placement verification: chest rise, condensation, CXR verification, direct visualization, equal breath sounds and ETCO2 detector      CXR findings:  ETT in proper place  Post-procedure details:     Patient tolerance of procedure:  Tolerated well, no immediate complications            MEDICATIONS GIVEN IN ER    Medications   propofol (DIPRIVAN) infusion 10 mg/mL 100 mL (40 mcg/kg/min × 113 kg Intravenous Rate/Dose Change 4/1/21 1117)   norepinephrine (LEVOPHED) 8 mg in 250 mL NS infusion (premix) (has no administration in time range)   Norepinephrine-Sodium Chloride (LEVOPHED) 8-0.9 MG/250ML-% infusion solution  - ADS Override Pull (has no administration in time range)   atropine sulfate injection  - ADS Override Pull (has no administration in time range)   etomidate (AMIDATE) injection 30 mg (30 mg Intravenous Given by Other 4/1/21 2334)   succinylcholine (ANECTINE) injection 200 mg (200 mg Intravenous Given by Other 4/1/21 2335)   furosemide (LASIX) injection 40 mg (40 mg Intravenous Given 4/2/21 0002)   midazolam (VERSED) injection 2 mg (2 mg Intravenous Given 4/2/21 0017)         PROGRESS, DATA ANALYSIS, CONSULTS, AND MEDICAL DECISION MAKING    My differential diagnosis for dyspnea includes but is not limited to:  Asthma, COPD, pneumonia, pulmonary embolus, acute respiratory  distress syndrome, pneumothorax, pleural effusion, pulmonary fibrosis, congestive heart failure, myocardial infarction, DKA, uremia, acidosis, sepsis, anemia, drug related, hyperventilation, CNS disease    Total critical care time: Approximately 35 minutes    Due to a high probability of clinically significant, life threatening deterioration, the patient required my highest level of preparedness to intervene emergently and I personally spent this critical care time directly and personally managing the patient. This critical care time included obtaining a history; examining the patient; vital sign monitoring; ordering and review of studies; arranging urgent treatment with development of a management plan; evaluation of patient's response to treatment; frequent reassessment; and, discussions with other providers.    This critical care time was performed to assess and manage the high probability of imminent, life-threatening deterioration that could result in multi-organ failure. It was exclusive of separately billable procedures and treating other patients and teaching time.    Please see MDM section and the rest of the note for further information on patient assessment and treatment.      All labs have been independently reviewed by me.  All radiology studies have been reviewed by me and discussed with radiologist dictating the report.   EKG's independently viewed and interpreted by me.  Discussion below represents my analysis of pertinent findings related to patient's condition, differential diagnosis, treatment plan and final disposition.      ED Course as of Apr 02 0028   Thu Apr 01, 2021   2351 EKG           EKG time: 23 2 7  Rhythm/Rate: Sinus with bigeminy PVCs; rate 100  P waves and WA: FRANCISCO within normal limits with prominent P waves  QRS, axis: IVCD consistent with right bundle branch block  ST and T waves: No STEMI; QTC within normal limits    Interpreted Contemporaneously by me, independently  viewed  Comparison: Unavailable      [RS]   2351 EKG   #2      EKG time: 2340  Rhythm/Rate: Sinus, 75  P waves and WV: FRANCISCO within normal limits  QRS, axis: Wide-complex consistent with right bundle branch block  ST and T waves: ST repolarization abnormality; there is some ST depression V2 V3 and V4; no STEMI appreciated; QTC within normal limits    Interpreted Contemporaneously by me, independently viewed  Comparison: Improved from prior      [RS]   2352 RADIOLOGY        Study: Portable chest x-ray-1 view    Findings: Significant pulmonary edema right greater than left with no pneumothorax appreciated.    Interpreted contemporaneously with treatment by me, independently viewed by me        [RS]   Fri Apr 02, 2021   0026 CONSULT        Provider: Dr. Hunt-pulmonary/critical care    Discussion: Reviewed patient history, ED presentation and evaluation as well as interventions, current drip settings, and current vital signs as well as vent settings.  He is agreeable to accept the patient for admission to the ICU.  He agrees with holding off on fluid boluses at this time and titrating nor epi as needed.  He also agrees with holding off on any further sedation drips and rather providing as needed Versed given the patient's hemodynamic instability at this time.  He accepts this patient for ICU admission for further evaluation and treatment.    Agreeable c treatment and planned disposition.            [RS]      ED Course User Index  [RS] Mikhail Schmitz MD       AS OF 00:28 EDT VITALS:    BP - 108/76  HR - 56  TEMP -    O2 SATS - 97%        DIAGNOSIS  Final diagnoses:   Acute hypoxemic respiratory failure (CMS/HCC)   Acute pulmonary edema (CMS/HCC)   Acute respiratory acidosis         DISPOSITION  ADMISSION    Discussed treatment plan and reason for admission with pt/family and admitting physician.  Pt/family voiced understanding of the plan for admission for further testing/treatment as needed.          Mikhail Schmitz,  MD  04/02/21 0028

## 2021-04-02 NOTE — ED NOTES
Pt arrived via ems with c/o soa. Pt states he has been feeling well all day then suddenly couldn't breathe.  Pt with increased work of effort and low spo2 (88% on 15 lpm NRB per ems).  Dr Schmitz discussed with patient, and elective intubation was decided upon, rather than cpap.  Pt intubated with 7.5 ett, 25.5 cm at lip, via glidescope per Dr Schmitz.  Pt tolerated well.  Xray confirmed fluid on lungs, which correlates with bibasilar diminished sounds.  Pt given lasix iv and started on propofol drip.  Pt did not tolerate propofol, and blood pressure dropped, so propofol stopped, norepi started.  Pt given versed prn for decreased sedation.  Report given to jordy costa, in icu and pt transported with rn, tech and respiratory therapy.  Pt tolerated well.     Padmini Mcdonald RN  04/02/21 0119

## 2021-04-02 NOTE — PROGRESS NOTES
"  Daily Progress Note.   [facility] INTENSIVE CARE  4/2/2021    Patient:  Name:  Harvinder Vega  MRN:  [id number]  1960  61 y.o.  male         CC:  soa  Interval History:  Remains intubated on vasopressors  No acute events  Oxygen being weaned.  Diuresis with cxr  Otherwise history unobtainable due to lethargy.    Physical Exam:  BP (!) 77/52   Pulse 61   Temp 96.8 °F (36 °C) (Tympanic)   Resp 24   Ht 182.9 cm (72\")   Wt 113 kg (250 lb)   SpO2 96%   BMI 33.91 kg/m²   Body mass index is 33.91 kg/m².    Intake/Output Summary (Last 24 hours) at 4/2/2021 1016  Last data filed at 4/2/2021 0500  Gross per 24 hour   Intake 301 ml   Output --   Net 301 ml   Vent Settings          Resp Rate (Set): 24     FiO2 (%): 50 %  PEEP/CPAP (cm H2O): 12 cm H20    Minute Ventilation (L/min) (Obs): 13.3 L/min  Resp Rate (Observed) Vent: 24     I:E Ratio (Obs): 1:2.30    PIP Observed (cm H2O): 34 cm H2O  Plateau Pressure (cm H2O): 0 cm H2O    General appearance: non toxic on mech vent  Eyes: anicteric sclerae, moist conjunctivae  HENT: Atraumatic; +ET tube  Neck: Trachea midline;  Lungs: mech air entry bilaterally no wheeze no rhonchi +,mechanical  CV: RRR, no rub  Abdomen: Soft, non-tender; no masses or HSM +obese  Extremities: No peripheral edema or extremity lymphadenopathy  Skin: cool to touch distal ble   Psych/neuro: sedated limiting exam    Data Review:  Notable Labs:  Results from last 7 days   Lab Units 04/02/21  0508 04/01/21  2342   WBC 10*3/mm3 19.94* 15.66*   HEMOGLOBIN g/dL 15.2 16.1   PLATELETS 10*3/mm3 184 205     Results from last 7 days   Lab Units 04/02/21  0508 04/01/21  2342   SODIUM mmol/L 138 139   POTASSIUM mmol/L 4.1 4.0   CHLORIDE mmol/L 105 104   CO2 mmol/L 21.1* 23.1   BUN mg/dL 17 15   CREATININE mg/dL 1.04 0.91   GLUCOSE mg/dL 110* 195*   CALCIUM mg/dL 8.8 8.5*   MAGNESIUM mg/dL  --  2.1   Estimated Creatinine Clearance: 94.4 mL/min (by C-G formula based on SCr of 1.04 " mg/dL).    Results from last 7 days   Lab Units 04/02/21  0508 04/02/21  0010 04/01/21  2342   AST (SGOT) U/L  --   --  19   ALT (SGPT) U/L  --   --  21   PROCALCITONIN ng/mL  --   --  0.05   LACTATE mmol/L  --  1.7  --    D DIMER QUANT MCGFEU/mL  --   --  1.09*   PLATELETS 10*3/mm3 184  --  205       Results from last 7 days   Lab Units 04/02/21  0043 04/01/21  2358   PH, ARTERIAL pH units 7.262* 7.189*   PCO2, ARTERIAL mm Hg 51.0* 66.4*   PO2 ART mm Hg 108.2* 81.7   HCO3 ART mmol/L 22.9 25.3       Imaging:  Reviewed chest images personally from past 3 days    Scheduled meds:    atropine, 1 mg, Intravenous, Once  clopidogrel, 75 mg, Oral, Daily        ASSESSMENT  /  PLAN:  1. Acute pulmonary edema secondary to hypertensive emergency in mitral valve regurgitation moderate to severe  2. Hypertensive emergency on presentation  3. Hypotension - vasopressors  4. Acute hypercapnic and hypoxic respiratory failure  5. Moderate to severe MR  6. CAD s/p CABG in November 2020  7. Partial RBBB  8. nstemi.        · ABG vent reviewed adjusted  · Wean vasopressors as able  · Propofol.  Titrate to keep YOBANY -1-0. PRN fentanyl  · Cont lasix, send procal  · Mvr, trop elevation, cad, abnormal ecg --> consult to cards  · Hold statin in acute illness  · Hold carvedilol due to hypotension  · Cont Plavix  · DVT prophylaxis with Lovenox    Total critical care time was 40 minutes, excluding any separately billable procedure time.  Time did not overlap with any other provider.      Jeffrey Yan MD  [facility]  04/02/21  1102a EDT

## 2021-04-03 ENCOUNTER — APPOINTMENT (OUTPATIENT)
Dept: GENERAL RADIOLOGY | Facility: HOSPITAL | Age: 61
End: 2021-04-03

## 2021-04-03 LAB
ANION GAP SERPL CALCULATED.3IONS-SCNC: 12.3 MMOL/L (ref 5–15)
ARTERIAL PATENCY WRIST A: NORMAL
ATMOSPHERIC PRESS: 763.9 MMHG
BASE EXCESS BLDA CALC-SCNC: 0 MMOL/L (ref 0–2)
BASOPHILS # BLD AUTO: 0.06 10*3/MM3 (ref 0–0.2)
BASOPHILS NFR BLD AUTO: 0.5 % (ref 0–1.5)
BDY SITE: NORMAL
BUN SERPL-MCNC: 24 MG/DL (ref 8–23)
BUN/CREAT SERPL: 26.4 (ref 7–25)
CALCIUM SPEC-SCNC: 8.7 MG/DL (ref 8.6–10.5)
CHLORIDE SERPL-SCNC: 105 MMOL/L (ref 98–107)
CO2 SERPL-SCNC: 22.7 MMOL/L (ref 22–29)
CREAT SERPL-MCNC: 0.91 MG/DL (ref 0.76–1.27)
DEPRECATED RDW RBC AUTO: 43.7 FL (ref 37–54)
EOSINOPHIL # BLD AUTO: 0.04 10*3/MM3 (ref 0–0.4)
EOSINOPHIL NFR BLD AUTO: 0.3 % (ref 0.3–6.2)
ERYTHROCYTE [DISTWIDTH] IN BLOOD BY AUTOMATED COUNT: 13 % (ref 12.3–15.4)
GFR SERPL CREATININE-BSD FRML MDRD: 84 ML/MIN/1.73
GLUCOSE BLDC GLUCOMTR-MCNC: 86 MG/DL (ref 70–130)
GLUCOSE SERPL-MCNC: 102 MG/DL (ref 65–99)
HCO3 BLDA-SCNC: 23.9 MMOL/L (ref 22–28)
HCT VFR BLD AUTO: 42 % (ref 37.5–51)
HGB BLD-MCNC: 14.5 G/DL (ref 13–17.7)
IMM GRANULOCYTES # BLD AUTO: 0.04 10*3/MM3 (ref 0–0.05)
IMM GRANULOCYTES NFR BLD AUTO: 0.3 % (ref 0–0.5)
INHALED O2 CONCENTRATION: 35 %
LYMPHOCYTES # BLD AUTO: 3.09 10*3/MM3 (ref 0.7–3.1)
LYMPHOCYTES NFR BLD AUTO: 24.6 % (ref 19.6–45.3)
MCH RBC QN AUTO: 31.6 PG (ref 26.6–33)
MCHC RBC AUTO-ENTMCNC: 34.5 G/DL (ref 31.5–35.7)
MCV RBC AUTO: 91.5 FL (ref 79–97)
MODALITY: NORMAL
MONOCYTES # BLD AUTO: 1.71 10*3/MM3 (ref 0.1–0.9)
MONOCYTES NFR BLD AUTO: 13.6 % (ref 5–12)
NEUTROPHILS NFR BLD AUTO: 60.7 % (ref 42.7–76)
NEUTROPHILS NFR BLD AUTO: 7.64 10*3/MM3 (ref 1.7–7)
NRBC BLD AUTO-RTO: 0 /100 WBC (ref 0–0.2)
O2 A-A PPRESDIFF RESPIRATORY: 0.4 MMHG
PCO2 BLDA: 35.9 MM HG (ref 35–45)
PH BLDA: 7.43 PH UNITS (ref 7.35–7.45)
PLATELET # BLD AUTO: 164 10*3/MM3 (ref 140–450)
PMV BLD AUTO: 11.6 FL (ref 6–12)
PO2 BLDA: 80.9 MM HG (ref 80–100)
POTASSIUM SERPL-SCNC: 3.5 MMOL/L (ref 3.5–5.2)
PROCALCITONIN SERPL-MCNC: 0.12 NG/ML (ref 0–0.25)
QT INTERVAL: 406 MS
RBC # BLD AUTO: 4.59 10*6/MM3 (ref 4.14–5.8)
SAO2 % BLDCOA: 96.3 % (ref 92–99)
SET MECH RESP RATE: 24
SODIUM SERPL-SCNC: 140 MMOL/L (ref 136–145)
TOTAL RATE: 24 BREATHS/MINUTE
TROPONIN T SERPL-MCNC: 0.07 NG/ML (ref 0–0.03)
VENTILATOR MODE: AC
VT ON VENT VENT: 550 ML
WBC # BLD AUTO: 12.58 10*3/MM3 (ref 3.4–10.8)

## 2021-04-03 PROCEDURE — 93005 ELECTROCARDIOGRAM TRACING: CPT | Performed by: INTERNAL MEDICINE

## 2021-04-03 PROCEDURE — 84145 PROCALCITONIN (PCT): CPT | Performed by: INTERNAL MEDICINE

## 2021-04-03 PROCEDURE — 36600 WITHDRAWAL OF ARTERIAL BLOOD: CPT

## 2021-04-03 PROCEDURE — 94760 N-INVAS EAR/PLS OXIMETRY 1: CPT

## 2021-04-03 PROCEDURE — 71045 X-RAY EXAM CHEST 1 VIEW: CPT

## 2021-04-03 PROCEDURE — 99233 SBSQ HOSP IP/OBS HIGH 50: CPT | Performed by: INTERNAL MEDICINE

## 2021-04-03 PROCEDURE — 87070 CULTURE OTHR SPECIMN AEROBIC: CPT | Performed by: INTERNAL MEDICINE

## 2021-04-03 PROCEDURE — 94799 UNLISTED PULMONARY SVC/PX: CPT

## 2021-04-03 PROCEDURE — 82962 GLUCOSE BLOOD TEST: CPT

## 2021-04-03 PROCEDURE — 25010000002 FUROSEMIDE PER 20 MG: Performed by: INTERNAL MEDICINE

## 2021-04-03 PROCEDURE — 85025 COMPLETE CBC W/AUTO DIFF WBC: CPT | Performed by: INTERNAL MEDICINE

## 2021-04-03 PROCEDURE — 87205 SMEAR GRAM STAIN: CPT | Performed by: INTERNAL MEDICINE

## 2021-04-03 PROCEDURE — 84484 ASSAY OF TROPONIN QUANT: CPT | Performed by: INTERNAL MEDICINE

## 2021-04-03 PROCEDURE — 25010000002 PROPOFOL 10 MG/ML EMULSION: Performed by: EMERGENCY MEDICINE

## 2021-04-03 PROCEDURE — 82803 BLOOD GASES ANY COMBINATION: CPT

## 2021-04-03 PROCEDURE — 94003 VENT MGMT INPAT SUBQ DAY: CPT

## 2021-04-03 PROCEDURE — 25010000002 ENOXAPARIN PER 10 MG: Performed by: INTERNAL MEDICINE

## 2021-04-03 PROCEDURE — 80048 BASIC METABOLIC PNL TOTAL CA: CPT | Performed by: INTERNAL MEDICINE

## 2021-04-03 PROCEDURE — 25010000002 PIPERACILLIN SOD-TAZOBACTAM PER 1 G: Performed by: INTERNAL MEDICINE

## 2021-04-03 RX ORDER — FUROSEMIDE 10 MG/ML
20 INJECTION INTRAMUSCULAR; INTRAVENOUS ONCE
Status: COMPLETED | OUTPATIENT
Start: 2021-04-03 | End: 2021-04-03

## 2021-04-03 RX ADMIN — FAMOTIDINE 20 MG: 20 TABLET, FILM COATED ORAL at 08:01

## 2021-04-03 RX ADMIN — TAZOBACTAM SODIUM AND PIPERACILLIN SODIUM 3.38 G: 375; 3 INJECTION, SOLUTION INTRAVENOUS at 18:00

## 2021-04-03 RX ADMIN — METOPROLOL TARTRATE 2.5 MG: 5 INJECTION INTRAVENOUS at 17:58

## 2021-04-03 RX ADMIN — TAZOBACTAM SODIUM AND PIPERACILLIN SODIUM 3.38 G: 375; 3 INJECTION, SOLUTION INTRAVENOUS at 12:33

## 2021-04-03 RX ADMIN — PROPOFOL 40 MCG/KG/MIN: 10 INJECTION, EMULSION INTRAVENOUS at 08:11

## 2021-04-03 RX ADMIN — METOPROLOL TARTRATE 2.5 MG: 5 INJECTION INTRAVENOUS at 08:05

## 2021-04-03 RX ADMIN — FAMOTIDINE 20 MG: 20 TABLET, FILM COATED ORAL at 17:58

## 2021-04-03 RX ADMIN — FUROSEMIDE 20 MG: 10 INJECTION, SOLUTION INTRAMUSCULAR; INTRAVENOUS at 12:33

## 2021-04-03 RX ADMIN — CHLORHEXIDINE GLUCONATE 15 ML: 1.2 RINSE ORAL at 08:11

## 2021-04-03 RX ADMIN — CLOPIDOGREL 75 MG: 75 TABLET, FILM COATED ORAL at 08:01

## 2021-04-03 RX ADMIN — PROPOFOL 40 MCG/KG/MIN: 10 INJECTION, EMULSION INTRAVENOUS at 03:36

## 2021-04-03 RX ADMIN — PROPOFOL 40 MCG/KG/MIN: 10 INJECTION, EMULSION INTRAVENOUS at 11:36

## 2021-04-03 RX ADMIN — FUROSEMIDE 40 MG: 10 INJECTION, SOLUTION INTRAMUSCULAR; INTRAVENOUS at 03:36

## 2021-04-03 RX ADMIN — ENOXAPARIN SODIUM 40 MG: 40 INJECTION SUBCUTANEOUS at 15:54

## 2021-04-03 NOTE — PROGRESS NOTES
Patient Name: Harvinder Vega  Patient : 1960        Date of Service:21  Provider of Service: James East MD  Place of Service: [facility]  Referral Provider: Angelica Hunt MD          Follow Up: Hypertensive emergency, elevated troponin, coronary disease  Interval Hx: Patient remains intubated.  Blood pressure more stable.  Off pressors.        OBJECTIVE  Temp:  [98.6 °F (37 °C)-100.8 °F (38.2 °C)] 100.4 °F (38 °C)  Heart Rate:  [60-80] 76  Resp:  [20-24] 20  BP: ()/(51-89) 99/65  FiO2 (%):  [35 %-50 %] 35 %     Intake/Output Summary (Last 24 hours) at 4/3/2021 0737  Last data filed at 4/3/2021 0500  Gross per 24 hour   Intake 678.77 ml   Output 1650 ml   Net -971.23 ml     Body mass index is 32.08 kg/m².      21  2327 21  0500   Weight: 113 kg (250 lb) 107 kg (236 lb 8.9 oz)         Physical Exam:   Constitutional:       Appearance: Obese. Acutely ill-appearing.      Interventions: Sedated and intubated.   Neck:      Vascular: JVD normal.   Pulmonary:      Effort: Intubated.      Breath sounds: Normal breath sounds.   Cardiovascular:      Normal rate. Regular rhythm. Normal S1. Normal S2.      Murmurs: There is a grade 1/6 systolic murmur.      No gallop. No rub.   Edema:     Peripheral edema absent.           CURRENT MEDS    Scheduled Meds:atropine, 1 mg, Intravenous, Once  chlorhexidine, 15 mL, Mouth/Throat, Q12H  clopidogrel, 75 mg, Oral, Daily  enoxaparin, 40 mg, Subcutaneous, Q24H  famotidine, 20 mg, Nasogastric, BID AC      Continuous Infusions:norepinephrine, 0.02-0.3 mcg/kg/min, Last Rate: Stopped (21 0500)  propofol, 5-50 mcg/kg/min, Last Rate: 40 mcg/kg/min (21 0336)          Lab Review:   Results from last 7 days   Lab Units 21  0522 21  0508 21  2342   SODIUM mmol/L 140 138 139   POTASSIUM mmol/L 3.5 4.1 4.0   CHLORIDE mmol/L 105 105 104   CO2 mmol/L 22.7 21.1* 23.1   BUN mg/dL 24* 17 15   CREATININE mg/dL 0.91  1.04 0.91   GLUCOSE mg/dL 102* 110* 195*   CALCIUM mg/dL 8.7 8.8 8.5*   AST (SGOT) U/L  --   --  19   ALT (SGPT) U/L  --   --  21     Results from last 7 days   Lab Units 04/02/21  1410 04/02/21  0508 04/01/21  2342   TROPONIN T ng/mL 0.044* 0.112* <0.010     Results from last 7 days   Lab Units 04/03/21  0522 04/02/21  0508   WBC 10*3/mm3 12.58* 19.94*   HEMOGLOBIN g/dL 14.5 15.2   HEMATOCRIT % 42.0 46.2   PLATELETS 10*3/mm3 164 184     Results from last 7 days   Lab Units 04/01/21  2342   INR  1.10     Results from last 7 days   Lab Units 04/01/21  2342   MAGNESIUM mg/dL 2.1         Results from last 7 days   Lab Units 04/01/21  2342   PROBNP pg/mL 634.7     Results from last 7 days   Lab Units 04/01/21  2342   TSH uIU/mL 1.960       I personally reviewed the patient's ECG and telemetry data    ASSESSMENT & PLAN    Acute hypoxemic respiratory failure (CMS/HCC)    1.  Acute hypoxic respiratory failure: Remains intubated.  Per pulmonary  2.  Hypertensive emergency: Blood pressure better.  We will start IV Lopressor and continue to monitor  3.  Coronary artery disease: Status post CABG.  Left ventricular systolic ejection fraction not assessable due to poor images.  Most recent echo in February showed normal left ventricular systolic function  4.  Mitral regurgitation: Moderate to severe by echo in February of this year.  Will require reevaluation once extubated  5.  Elevated troponin: We will repeat.    James East MD  04/03/21

## 2021-04-03 NOTE — PROGRESS NOTES
"  Daily Progress Note.   [facility]  4/3/2021    Patient:  Name:  Harvinder Vega  MRN:  [id number]  1960  61 y.o.  male         CC:  soa  Summary:  In summary this is a 61-year-old male who presented to the ER with bilateral pulmonary infiltrates is in respiratory distress and acute hypoxic respiratory failure with a history of coronary artery disease CABG mitral valve regurgitation who was intubated in the ER.  He was given IV diuretics and has substantial improvement in his chest x-ray.    Interval History:  Patient diuresed yesterday and requirements much improved.  Did have a fever overnight.  He is intubated and unable give any reliable history.    Physical Exam:  /76   Pulse 70   Temp 99.9 °F (37.7 °C) (Oral)   Resp 20   Ht 182.9 cm (72\")   Wt 107 kg (236 lb 8.9 oz)   SpO2 96%   BMI 32.08 kg/m²   Body mass index is 32.08 kg/m².    Intake/Output Summary (Last 24 hours) at 4/3/2021 1204  Last data filed at 4/3/2021 0754  Gross per 24 hour   Intake 778.77 ml   Output 1650 ml   Net -871.23 ml   Vent Settings          Resp Rate (Set): 20     FiO2 (%): 21 %  PEEP/CPAP (cm H2O): 10 cm H20    Minute Ventilation (L/min) (Obs): 11.1 L/min  Resp Rate (Observed) Vent: 20     I:E Ratio (Obs): 1:2.90    PIP Observed (cm H2O): 36 cm H2O  Plateau Pressure (cm H2O): 0 cm H2O    General appearance: non toxic on mech vent  Eyes: anicteric sclerae, moist conjunctivae  HENT: Atraumatic; +ET tube  Neck: Trachea midline;  Lungs: mech air entry bilaterally no wheeze no rhonchi +,mechanical  CV: RRR, no rub  Abdomen: Soft, non-tender; no masses or HSM +obese  Extremities: No peripheral edema or extremity lymphadenopathy  Skin: cool to touch distal ble   Psych/neuro: sedated limiting exam    Data Review:  Notable Labs:  Results from last 7 days   Lab Units 04/03/21  0522 04/02/21  0508 04/01/21  2342   WBC 10*3/mm3 12.58* 19.94* 15.66*   HEMOGLOBIN g/dL 14.5 15.2 16.1   PLATELETS 10*3/mm3 " 164 184 205     Results from last 7 days   Lab Units 04/03/21  0522 04/02/21  0508 04/01/21  2342   SODIUM mmol/L 140 138 139   POTASSIUM mmol/L 3.5 4.1 4.0   CHLORIDE mmol/L 105 105 104   CO2 mmol/L 22.7 21.1* 23.1   BUN mg/dL 24* 17 15   CREATININE mg/dL 0.91 1.04 0.91   GLUCOSE mg/dL 102* 110* 195*   CALCIUM mg/dL 8.7 8.8 8.5*   MAGNESIUM mg/dL  --   --  2.1   Estimated Creatinine Clearance: 105.1 mL/min (by C-G formula based on SCr of 0.91 mg/dL).    Results from last 7 days   Lab Units 04/03/21  0522 04/02/21  0508 04/02/21  0010 04/01/21  2342   AST (SGOT) U/L  --   --   --  19   ALT (SGPT) U/L  --   --   --  21   PROCALCITONIN ng/mL 0.12  --   --  0.05   LACTATE mmol/L  --   --  1.7  --    D DIMER QUANT MCGFEU/mL  --   --   --  1.09*   PLATELETS 10*3/mm3 164 184  --  205       Results from last 7 days   Lab Units 04/03/21  0444 04/02/21  1139 04/02/21  0043 04/01/21  2358   PH, ARTERIAL pH units 7.432 7.416 7.262* 7.189*   PCO2, ARTERIAL mm Hg 35.9 34.6* 51.0* 66.4*   PO2 ART mm Hg 80.9 90.7 108.2* 81.7   HCO3 ART mmol/L 23.9 22.3 22.9 25.3       Imaging:  Reviewed chest images personally from past 3 days    Scheduled meds:    atropine, 1 mg, Intravenous, Once  chlorhexidine, 15 mL, Mouth/Throat, Q12H  clopidogrel, 75 mg, Oral, Daily  enoxaparin, 40 mg, Subcutaneous, Q24H  famotidine, 20 mg, Nasogastric, BID AC  metoprolol tartrate, 2.5 mg, Intravenous, Q8H  piperacillin-tazobactam, 3.375 g, Intravenous, Once  piperacillin-tazobactam, 3.375 g, Intravenous, Q8H        ASSESSMENT  /  PLAN:  1. Acute pulmonary edema secondary to hypertensive emergency in mitral valve regurgitation moderate to severe  2. Hypertensive emergency on presentation  3. Hypotension - vasopressors  4. Acute hypercapnic and hypoxic respiratory failure  5. Moderate to severe MR  6. CAD s/p CABG in November 2020  7. Partial RBBB  8. Nstemi.    Fever overnight start Zosyn cultures already drawn send off procalcitonin in a.m.  labs  Significant improvement in oxygenation requirements on ventilator after diuretics as well as substantial clearing on chest x-ray.  ABG  Serial chest x-rays  Creatinine improved with diuretics  One-time Lasix 40 today  Cardiology following for troponin leak coronary artery disease in the setting of moderate to severe mitral valve regurgitation.  Repeat echo now shows only mild mitral valve regurgitation.  Poor windows do not allow LV assessment.  Continue cardiac optimization and investigations as they deem appropriate.  We will give more diuretics today monitor renal function.  Repeat chest x-ray  Vent reviewed trial for SBT today decrease peep  Pain control  Plavix  Holding Coreg secondary to hypotension.  Propofol and as needed fentanyl.  DVT prophylaxis Lovenox  Pepcid      Updated a very nice son at bedside and all questions answered.    Total critical care time was 38 minutes, excluding any separately billable procedure time.  Time did not overlap with any other provider.      Jeffrey Yan MD  [facility]  04/03/21  2835

## 2021-04-04 ENCOUNTER — APPOINTMENT (OUTPATIENT)
Dept: GENERAL RADIOLOGY | Facility: HOSPITAL | Age: 61
End: 2021-04-04

## 2021-04-04 LAB
ANION GAP SERPL CALCULATED.3IONS-SCNC: 12.2 MMOL/L (ref 5–15)
ARTERIAL PATENCY WRIST A: NORMAL
ATMOSPHERIC PRESS: 760 MMHG
BASE EXCESS BLDA CALC-SCNC: 0.9 MMOL/L (ref 0–2)
BASOPHILS # BLD AUTO: 0.06 10*3/MM3 (ref 0–0.2)
BASOPHILS NFR BLD AUTO: 0.5 % (ref 0–1.5)
BDY SITE: NORMAL
BUN SERPL-MCNC: 21 MG/DL (ref 8–23)
BUN/CREAT SERPL: 26.6 (ref 7–25)
CALCIUM SPEC-SCNC: 8.7 MG/DL (ref 8.6–10.5)
CHLORIDE SERPL-SCNC: 104 MMOL/L (ref 98–107)
CO2 SERPL-SCNC: 24.8 MMOL/L (ref 22–29)
CREAT SERPL-MCNC: 0.79 MG/DL (ref 0.76–1.27)
DEPRECATED RDW RBC AUTO: 42.9 FL (ref 37–54)
EOSINOPHIL # BLD AUTO: 0.04 10*3/MM3 (ref 0–0.4)
EOSINOPHIL NFR BLD AUTO: 0.3 % (ref 0.3–6.2)
ERYTHROCYTE [DISTWIDTH] IN BLOOD BY AUTOMATED COUNT: 12.9 % (ref 12.3–15.4)
GAS FLOW AIRWAY: 2 LPM
GFR SERPL CREATININE-BSD FRML MDRD: 99 ML/MIN/1.73
GLUCOSE SERPL-MCNC: 93 MG/DL (ref 65–99)
HCO3 BLDA-SCNC: 25.9 MMOL/L (ref 22–28)
HCT VFR BLD AUTO: 39.1 % (ref 37.5–51)
HGB BLD-MCNC: 13 G/DL (ref 13–17.7)
IMM GRANULOCYTES # BLD AUTO: 0.06 10*3/MM3 (ref 0–0.05)
IMM GRANULOCYTES NFR BLD AUTO: 0.5 % (ref 0–0.5)
LYMPHOCYTES # BLD AUTO: 2.31 10*3/MM3 (ref 0.7–3.1)
LYMPHOCYTES NFR BLD AUTO: 18.4 % (ref 19.6–45.3)
MCH RBC QN AUTO: 30 PG (ref 26.6–33)
MCHC RBC AUTO-ENTMCNC: 33.2 G/DL (ref 31.5–35.7)
MCV RBC AUTO: 90.1 FL (ref 79–97)
MODALITY: NORMAL
MONOCYTES # BLD AUTO: 1.81 10*3/MM3 (ref 0.1–0.9)
MONOCYTES NFR BLD AUTO: 14.4 % (ref 5–12)
NEUTROPHILS NFR BLD AUTO: 65.9 % (ref 42.7–76)
NEUTROPHILS NFR BLD AUTO: 8.28 10*3/MM3 (ref 1.7–7)
NRBC BLD AUTO-RTO: 0 /100 WBC (ref 0–0.2)
PCO2 BLDA: 41.9 MM HG (ref 35–45)
PH BLDA: 7.4 PH UNITS (ref 7.35–7.45)
PLATELET # BLD AUTO: 152 10*3/MM3 (ref 140–450)
PMV BLD AUTO: 11.6 FL (ref 6–12)
PO2 BLDA: 85 MM HG (ref 80–100)
POTASSIUM SERPL-SCNC: 3.2 MMOL/L (ref 3.5–5.2)
PROCALCITONIN SERPL-MCNC: 0.08 NG/ML (ref 0–0.25)
RBC # BLD AUTO: 4.34 10*6/MM3 (ref 4.14–5.8)
SAO2 % BLDCOA: 96.4 % (ref 92–99)
SODIUM SERPL-SCNC: 141 MMOL/L (ref 136–145)
TOTAL RATE: 18 BREATHS/MINUTE
VENTILATOR MODE: NORMAL
WBC # BLD AUTO: 12.56 10*3/MM3 (ref 3.4–10.8)

## 2021-04-04 PROCEDURE — 85025 COMPLETE CBC W/AUTO DIFF WBC: CPT | Performed by: INTERNAL MEDICINE

## 2021-04-04 PROCEDURE — 71045 X-RAY EXAM CHEST 1 VIEW: CPT

## 2021-04-04 PROCEDURE — 36600 WITHDRAWAL OF ARTERIAL BLOOD: CPT

## 2021-04-04 PROCEDURE — 84145 PROCALCITONIN (PCT): CPT | Performed by: INTERNAL MEDICINE

## 2021-04-04 PROCEDURE — 99233 SBSQ HOSP IP/OBS HIGH 50: CPT | Performed by: INTERNAL MEDICINE

## 2021-04-04 PROCEDURE — 82803 BLOOD GASES ANY COMBINATION: CPT

## 2021-04-04 PROCEDURE — 25010000002 PIPERACILLIN SOD-TAZOBACTAM PER 1 G: Performed by: INTERNAL MEDICINE

## 2021-04-04 PROCEDURE — 80048 BASIC METABOLIC PNL TOTAL CA: CPT | Performed by: INTERNAL MEDICINE

## 2021-04-04 RX ORDER — AMOXICILLIN AND CLAVULANATE POTASSIUM 875; 125 MG/1; MG/1
1 TABLET, FILM COATED ORAL EVERY 12 HOURS SCHEDULED
Status: DISCONTINUED | OUTPATIENT
Start: 2021-04-04 | End: 2021-04-06 | Stop reason: HOSPADM

## 2021-04-04 RX ADMIN — FAMOTIDINE 20 MG: 20 TABLET, FILM COATED ORAL at 09:11

## 2021-04-04 RX ADMIN — FAMOTIDINE 20 MG: 20 TABLET, FILM COATED ORAL at 20:33

## 2021-04-04 RX ADMIN — AMOXICILLIN AND CLAVULANATE POTASSIUM 1 TABLET: 875; 125 TABLET, FILM COATED ORAL at 10:59

## 2021-04-04 RX ADMIN — METOPROLOL TARTRATE 25 MG: 25 TABLET, FILM COATED ORAL at 20:31

## 2021-04-04 RX ADMIN — AMOXICILLIN AND CLAVULANATE POTASSIUM 1 TABLET: 875; 125 TABLET, FILM COATED ORAL at 20:31

## 2021-04-04 RX ADMIN — CLOPIDOGREL 75 MG: 75 TABLET, FILM COATED ORAL at 09:11

## 2021-04-04 RX ADMIN — METOPROLOL TARTRATE 25 MG: 25 TABLET, FILM COATED ORAL at 09:11

## 2021-04-04 RX ADMIN — TAZOBACTAM SODIUM AND PIPERACILLIN SODIUM 3.38 G: 375; 3 INJECTION, SOLUTION INTRAVENOUS at 03:06

## 2021-04-04 RX ADMIN — METOPROLOL TARTRATE 2.5 MG: 5 INJECTION INTRAVENOUS at 01:28

## 2021-04-04 NOTE — PROGRESS NOTES
Patient Name: Harvinder Vega  Patient : 1960        Date of Service:21  Provider of Service: James East MD  Place of Service: [facility]  Referral Provider: Angelica Hunt MD          Follow Up: Hypertensive emergency, elevated troponin    Interval Hx: Patient now extubated.  Blood pressure better.  Troponin relatively flat.        OBJECTIVE  Temp:  [98.7 °F (37.1 °C)-100 °F (37.8 °C)] 98.7 °F (37.1 °C)  Heart Rate:  [61-90] 61  Resp:  [16] 16  BP: ()/(50-86) 130/75  FiO2 (%):  [21 %] 21 %     Intake/Output Summary (Last 24 hours) at 2021 0853  Last data filed at 2021 0843  Gross per 24 hour   Intake 1024 ml   Output 1250 ml   Net -226 ml     Body mass index is 32.08 kg/m².      21  2327 21  0500   Weight: 113 kg (250 lb) 107 kg (236 lb 8.9 oz)         Physical Exam:   Pulmonary:      Breath sounds: Wheezing present.   Cardiovascular:      Normal rate. Regular rhythm. Normal S1. Normal S2.      Murmurs: There is no murmur.      No gallop.   Pulses:     Intact distal pulses.   Edema:     Peripheral edema absent.           CURRENT MEDS    Scheduled Meds:atropine, 1 mg, Intravenous, Once  clopidogrel, 75 mg, Oral, Daily  enoxaparin, 40 mg, Subcutaneous, Q24H  famotidine, 20 mg, Nasogastric, BID AC  metoprolol tartrate, 2.5 mg, Intravenous, Q8H  piperacillin-tazobactam, 3.375 g, Intravenous, Q8H      Continuous Infusions:norepinephrine, 0.02-0.3 mcg/kg/min, Last Rate: Stopped (21 0500)  propofol, 5-50 mcg/kg/min, Last Rate: Stopped (21 1407)          Lab Review:   Results from last 7 days   Lab Units 21  0540 21  0522 21  2342   SODIUM mmol/L 141 140 139   POTASSIUM mmol/L 3.2* 3.5 4.0   CHLORIDE mmol/L 104 105 104   CO2 mmol/L 24.8 22.7 23.1   BUN mg/dL 21 24* 15   CREATININE mg/dL 0.79 0.91 0.91   GLUCOSE mg/dL 93 102* 195*   CALCIUM mg/dL 8.7 8.7 8.5*   AST (SGOT) U/L  --   --  19   ALT (SGPT) U/L  --   --  21      Results from last 7 days   Lab Units 04/03/21  0522 04/02/21  1410 04/02/21  0508 04/01/21  2342   TROPONIN T ng/mL 0.075* 0.044* 0.112* <0.010     Results from last 7 days   Lab Units 04/04/21  0540 04/03/21  0522   WBC 10*3/mm3 12.56* 12.58*   HEMOGLOBIN g/dL 13.0 14.5   HEMATOCRIT % 39.1 42.0   PLATELETS 10*3/mm3 152 164     Results from last 7 days   Lab Units 04/01/21  2342   INR  1.10     Results from last 7 days   Lab Units 04/01/21  2342   MAGNESIUM mg/dL 2.1         Results from last 7 days   Lab Units 04/01/21  2342   PROBNP pg/mL 634.7     Results from last 7 days   Lab Units 04/01/21  2342   TSH uIU/mL 1.960       I personally reviewed the patient's ECG and telemetry data    ASSESSMENT & PLAN    Acute hypoxemic respiratory failure (CMS/HCC)  1.  Acute hypoxic respiratory failure: Extubated Per pulmonary  2.  Hypertensive emergency: Blood pressure better.    Start oral beta-blocker.    3.  Coronary artery disease: Status post CABG.  Left ventricular systolic ejection fraction not assessable due to poor images.  Most recent echo in February showed normal left ventricular systolic function  4.  Mitral regurgitation: Moderate to severe by echo in February of this year.  Will require reevaluation once extubated  5.  Elevated troponin: We will repeat.          James East MD  04/04/21

## 2021-04-04 NOTE — PLAN OF CARE
Goal Outcome Evaluation:         Weaned off oxygen, up in chair most of am jasson well, short walks in room and bathroom, up with assist to void frequently, voiding 50-100cc per occurrence.  New IV's placed this am old IV sites assessed no longer c/o tenderness no redness noted..  SR, VSS, awaiting a Telemetry bed.  CGressRN

## 2021-04-05 ENCOUNTER — APPOINTMENT (OUTPATIENT)
Dept: CARDIOLOGY | Facility: HOSPITAL | Age: 61
End: 2021-04-05

## 2021-04-05 ENCOUNTER — APPOINTMENT (OUTPATIENT)
Dept: CARDIAC REHAB | Facility: HOSPITAL | Age: 61
End: 2021-04-05

## 2021-04-05 LAB
ANION GAP SERPL CALCULATED.3IONS-SCNC: 11.2 MMOL/L (ref 5–15)
AORTIC DIMENSIONLESS INDEX: 0.7 (DI)
BACTERIA SPEC RESP CULT: NORMAL
BASOPHILS # BLD AUTO: 0.07 10*3/MM3 (ref 0–0.2)
BASOPHILS NFR BLD AUTO: 0.7 % (ref 0–1.5)
BH CV ECHO MEAS - ACS: 2.1 CM
BH CV ECHO MEAS - AO MAX PG (FULL): 5.6 MMHG
BH CV ECHO MEAS - AO MAX PG: 11.2 MMHG
BH CV ECHO MEAS - AO MEAN PG (FULL): 3 MMHG
BH CV ECHO MEAS - AO MEAN PG: 6 MMHG
BH CV ECHO MEAS - AO ROOT AREA: 9.6 CM^2
BH CV ECHO MEAS - AO ROOT DIAM: 3.5 CM
BH CV ECHO MEAS - AO V2 MAX: 167 CM/SEC
BH CV ECHO MEAS - AO V2 MEAN: 114 CM/SEC
BH CV ECHO MEAS - AO V2 VTI: 28.3 CM
BH CV ECHO MEAS - AVA(I,A): 2.6 CM^2
BH CV ECHO MEAS - AVA(I,D): 2.6 CM^2
BH CV ECHO MEAS - AVA(V,A): 2.4 CM^2
BH CV ECHO MEAS - AVA(V,D): 2.4 CM^2
BH CV ECHO MEAS - CONTRAST EF 4CH: 47 CM2
BH CV ECHO MEAS - EDV(CUBED): 140.6 ML
BH CV ECHO MEAS - EDV(MOD-SP2): 105 ML
BH CV ECHO MEAS - EDV(MOD-SP4): 130 ML
BH CV ECHO MEAS - EDV(TEICH): 129.5 ML
BH CV ECHO MEAS - EF(CUBED): 61 %
BH CV ECHO MEAS - EF(MOD-BP): 47.2 %
BH CV ECHO MEAS - EF(MOD-SP2): 48.6 %
BH CV ECHO MEAS - EF(MOD-SP4): 43.8 %
BH CV ECHO MEAS - EF(TEICH): 52.2 %
BH CV ECHO MEAS - ESV(CUBED): 54.9 ML
BH CV ECHO MEAS - ESV(MOD-SP2): 54 ML
BH CV ECHO MEAS - ESV(MOD-SP4): 73 ML
BH CV ECHO MEAS - ESV(TEICH): 62 ML
BH CV ECHO MEAS - FS: 26.9 %
BH CV ECHO MEAS - IVS/LVPW: 1
BH CV ECHO MEAS - IVSD: 1.4 CM
BH CV ECHO MEAS - LAT PEAK E' VEL: 12.4 CM/SEC
BH CV ECHO MEAS - LV MASS(C)D: 309.6 GRAMS
BH CV ECHO MEAS - LV MAX PG: 5.6 MMHG
BH CV ECHO MEAS - LV MEAN PG: 3 MMHG
BH CV ECHO MEAS - LV V1 MAX: 118 CM/SEC
BH CV ECHO MEAS - LV V1 MEAN: 78.9 CM/SEC
BH CV ECHO MEAS - LV V1 VTI: 20.9 CM
BH CV ECHO MEAS - LVIDD: 5.2 CM
BH CV ECHO MEAS - LVIDS: 3.8 CM
BH CV ECHO MEAS - LVLD AP2: 7.3 CM
BH CV ECHO MEAS - LVLD AP4: 7.7 CM
BH CV ECHO MEAS - LVLS AP2: 6.8 CM
BH CV ECHO MEAS - LVLS AP4: 6.9 CM
BH CV ECHO MEAS - LVOT AREA (M): 3.5 CM^2
BH CV ECHO MEAS - LVOT AREA: 3.5 CM^2
BH CV ECHO MEAS - LVOT DIAM: 2.1 CM
BH CV ECHO MEAS - LVPWD: 1.4 CM
BH CV ECHO MEAS - MED PEAK E' VEL: 7.6 CM/SEC
BH CV ECHO MEAS - MR MAX PG: 94.9 MMHG
BH CV ECHO MEAS - MR MAX VEL: 487 CM/SEC
BH CV ECHO MEAS - MR MEAN PG: 68 MMHG
BH CV ECHO MEAS - MR MEAN VEL: 399 CM/SEC
BH CV ECHO MEAS - MR VTI: 152 CM
BH CV ECHO MEAS - MV A DUR: 0.11 SEC
BH CV ECHO MEAS - MV A MAX VEL: 49 CM/SEC
BH CV ECHO MEAS - MV DEC SLOPE: 328 CM/SEC^2
BH CV ECHO MEAS - MV DEC TIME: 240 SEC
BH CV ECHO MEAS - MV E MAX VEL: 86.9 CM/SEC
BH CV ECHO MEAS - MV E/A: 1.8
BH CV ECHO MEAS - MV MAX PG: 6.1 MMHG
BH CV ECHO MEAS - MV MEAN PG: 2 MMHG
BH CV ECHO MEAS - MV P1/2T MAX VEL: 130 CM/SEC
BH CV ECHO MEAS - MV P1/2T: 116.1 MSEC
BH CV ECHO MEAS - MV V2 MAX: 123 CM/SEC
BH CV ECHO MEAS - MV V2 MEAN: 65.8 CM/SEC
BH CV ECHO MEAS - MV V2 VTI: 35.8 CM
BH CV ECHO MEAS - MVA P1/2T LCG: 1.7 CM^2
BH CV ECHO MEAS - MVA(P1/2T): 1.9 CM^2
BH CV ECHO MEAS - MVA(VTI): 2 CM^2
BH CV ECHO MEAS - PA ACC TIME: 0.1 SEC
BH CV ECHO MEAS - PA MAX PG (FULL): 2.1 MMHG
BH CV ECHO MEAS - PA MAX PG: 3 MMHG
BH CV ECHO MEAS - PA PR(ACCEL): 36.3 MMHG
BH CV ECHO MEAS - PA V2 MAX: 86.9 CM/SEC
BH CV ECHO MEAS - PULM A REVS DUR: 0.1 SEC
BH CV ECHO MEAS - PULM A REVS VEL: 29.3 CM/SEC
BH CV ECHO MEAS - PULM DIAS VEL: 66.9 CM/SEC
BH CV ECHO MEAS - PULM S/D: 0.68
BH CV ECHO MEAS - PULM SYS VEL: 45.7 CM/SEC
BH CV ECHO MEAS - RAP SYSTOLE: 3 MMHG
BH CV ECHO MEAS - RV MAX PG: 0.93 MMHG
BH CV ECHO MEAS - RV MEAN PG: 0 MMHG
BH CV ECHO MEAS - RV V1 MAX: 48.3 CM/SEC
BH CV ECHO MEAS - RV V1 MEAN: 32.5 CM/SEC
BH CV ECHO MEAS - RV V1 VTI: 7.6 CM
BH CV ECHO MEAS - RVSP: 26 MMHG
BH CV ECHO MEAS - SV(AO): 272.3 ML
BH CV ECHO MEAS - SV(CUBED): 85.7 ML
BH CV ECHO MEAS - SV(LVOT): 72.4 ML
BH CV ECHO MEAS - SV(MOD-SP2): 51 ML
BH CV ECHO MEAS - SV(MOD-SP4): 57 ML
BH CV ECHO MEAS - SV(TEICH): 67.6 ML
BH CV ECHO MEAS - TAPSE (>1.6): 1.4 CM
BH CV ECHO MEAS - TR MAX VEL: 240 CM/SEC
BH CV ECHO MEASUREMENTS AVERAGE E/E' RATIO: 8.69
BH CV VAS BP RIGHT ARM: NORMAL MMHG
BH CV XLRA - RV BASE: 3.9 CM
BH CV XLRA - RV LENGTH: 8.1 CM
BH CV XLRA - RV MID: 2.4 CM
BH CV XLRA - TDI S': 12.9 CM/SEC
BUN SERPL-MCNC: 17 MG/DL (ref 8–23)
BUN/CREAT SERPL: 25.8 (ref 7–25)
CALCIUM SPEC-SCNC: 8.8 MG/DL (ref 8.6–10.5)
CHLORIDE SERPL-SCNC: 104 MMOL/L (ref 98–107)
CHOLEST SERPL-MCNC: 110 MG/DL (ref 0–200)
CO2 SERPL-SCNC: 24.8 MMOL/L (ref 22–29)
CREAT SERPL-MCNC: 0.66 MG/DL (ref 0.76–1.27)
DEPRECATED RDW RBC AUTO: 41.4 FL (ref 37–54)
EOSINOPHIL # BLD AUTO: 0.11 10*3/MM3 (ref 0–0.4)
EOSINOPHIL NFR BLD AUTO: 1.1 % (ref 0.3–6.2)
ERYTHROCYTE [DISTWIDTH] IN BLOOD BY AUTOMATED COUNT: 12.6 % (ref 12.3–15.4)
GFR SERPL CREATININE-BSD FRML MDRD: 122 ML/MIN/1.73
GLUCOSE SERPL-MCNC: 95 MG/DL (ref 65–99)
GRAM STN SPEC: NORMAL
HCT VFR BLD AUTO: 39.9 % (ref 37.5–51)
HDLC SERPL-MCNC: 39 MG/DL (ref 40–60)
HGB BLD-MCNC: 13.4 G/DL (ref 13–17.7)
IMM GRANULOCYTES # BLD AUTO: 0.03 10*3/MM3 (ref 0–0.05)
IMM GRANULOCYTES NFR BLD AUTO: 0.3 % (ref 0–0.5)
LDLC SERPL CALC-MCNC: 55 MG/DL (ref 0–100)
LDLC/HDLC SERPL: 1.43 {RATIO}
LV EF 2D ECHO EST: 47 %
LYMPHOCYTES # BLD AUTO: 2.15 10*3/MM3 (ref 0.7–3.1)
LYMPHOCYTES NFR BLD AUTO: 20.7 % (ref 19.6–45.3)
MCH RBC QN AUTO: 30.5 PG (ref 26.6–33)
MCHC RBC AUTO-ENTMCNC: 33.6 G/DL (ref 31.5–35.7)
MCV RBC AUTO: 90.9 FL (ref 79–97)
MONOCYTES # BLD AUTO: 1.48 10*3/MM3 (ref 0.1–0.9)
MONOCYTES NFR BLD AUTO: 14.2 % (ref 5–12)
NEUTROPHILS NFR BLD AUTO: 6.55 10*3/MM3 (ref 1.7–7)
NEUTROPHILS NFR BLD AUTO: 63 % (ref 42.7–76)
NRBC BLD AUTO-RTO: 0 /100 WBC (ref 0–0.2)
PLATELET # BLD AUTO: 150 10*3/MM3 (ref 140–450)
PMV BLD AUTO: 11.8 FL (ref 6–12)
POTASSIUM SERPL-SCNC: 3.4 MMOL/L (ref 3.5–5.2)
RBC # BLD AUTO: 4.39 10*6/MM3 (ref 4.14–5.8)
SODIUM SERPL-SCNC: 140 MMOL/L (ref 136–145)
TRIGL SERPL-MCNC: 77 MG/DL (ref 0–150)
VLDLC SERPL-MCNC: 16 MG/DL (ref 5–40)
WBC # BLD AUTO: 10.39 10*3/MM3 (ref 3.4–10.8)

## 2021-04-05 PROCEDURE — 93306 TTE W/DOPPLER COMPLETE: CPT | Performed by: INTERNAL MEDICINE

## 2021-04-05 PROCEDURE — 25010000002 PERFLUTREN (DEFINITY) 8.476 MG IN SODIUM CHLORIDE (PF) 0.9 % 10 ML INJECTION: Performed by: INTERNAL MEDICINE

## 2021-04-05 PROCEDURE — 80048 BASIC METABOLIC PNL TOTAL CA: CPT | Performed by: INTERNAL MEDICINE

## 2021-04-05 PROCEDURE — 99232 SBSQ HOSP IP/OBS MODERATE 35: CPT | Performed by: NURSE PRACTITIONER

## 2021-04-05 PROCEDURE — 85025 COMPLETE CBC W/AUTO DIFF WBC: CPT | Performed by: INTERNAL MEDICINE

## 2021-04-05 PROCEDURE — 80061 LIPID PANEL: CPT | Performed by: NURSE PRACTITIONER

## 2021-04-05 PROCEDURE — 93306 TTE W/DOPPLER COMPLETE: CPT

## 2021-04-05 PROCEDURE — 25010000002 ENOXAPARIN PER 10 MG: Performed by: INTERNAL MEDICINE

## 2021-04-05 RX ORDER — POTASSIUM CHLORIDE 750 MG/1
20 TABLET, FILM COATED, EXTENDED RELEASE ORAL DAILY
Status: DISCONTINUED | OUTPATIENT
Start: 2021-04-05 | End: 2021-04-06 | Stop reason: HOSPADM

## 2021-04-05 RX ORDER — CARVEDILOL 3.12 MG/1
3.12 TABLET ORAL 2 TIMES DAILY WITH MEALS
Status: DISCONTINUED | OUTPATIENT
Start: 2021-04-05 | End: 2021-04-06 | Stop reason: HOSPADM

## 2021-04-05 RX ORDER — FUROSEMIDE 40 MG/1
40 TABLET ORAL DAILY
Status: DISCONTINUED | OUTPATIENT
Start: 2021-04-05 | End: 2021-04-06 | Stop reason: HOSPADM

## 2021-04-05 RX ORDER — ATORVASTATIN CALCIUM 80 MG/1
80 TABLET, FILM COATED ORAL NIGHTLY
Status: DISCONTINUED | OUTPATIENT
Start: 2021-04-05 | End: 2021-04-06 | Stop reason: HOSPADM

## 2021-04-05 RX ADMIN — METOPROLOL TARTRATE 25 MG: 25 TABLET, FILM COATED ORAL at 08:35

## 2021-04-05 RX ADMIN — FUROSEMIDE 40 MG: 40 TABLET ORAL at 14:12

## 2021-04-05 RX ADMIN — POTASSIUM CHLORIDE 20 MEQ: 750 TABLET, EXTENDED RELEASE ORAL at 14:16

## 2021-04-05 RX ADMIN — FAMOTIDINE 20 MG: 20 TABLET, FILM COATED ORAL at 17:46

## 2021-04-05 RX ADMIN — AMOXICILLIN AND CLAVULANATE POTASSIUM 1 TABLET: 875; 125 TABLET, FILM COATED ORAL at 21:19

## 2021-04-05 RX ADMIN — ATORVASTATIN CALCIUM 80 MG: 80 TABLET, FILM COATED ORAL at 21:20

## 2021-04-05 RX ADMIN — CLOPIDOGREL 75 MG: 75 TABLET, FILM COATED ORAL at 08:35

## 2021-04-05 RX ADMIN — CARVEDILOL 3.12 MG: 3.12 TABLET, FILM COATED ORAL at 17:44

## 2021-04-05 RX ADMIN — FAMOTIDINE 20 MG: 20 TABLET, FILM COATED ORAL at 06:57

## 2021-04-05 RX ADMIN — PERFLUTREN 2 ML: 6.52 INJECTION, SUSPENSION INTRAVENOUS at 17:10

## 2021-04-05 RX ADMIN — AMOXICILLIN AND CLAVULANATE POTASSIUM 1 TABLET: 875; 125 TABLET, FILM COATED ORAL at 09:20

## 2021-04-05 RX ADMIN — ENOXAPARIN SODIUM 40 MG: 40 INJECTION SUBCUTANEOUS at 14:16

## 2021-04-05 NOTE — PROGRESS NOTES
LOS: 3 days   Patient Care Team:  Bruno Ricci MD as PCP - General (Internal Medicine)      Chief Complaint: Follow-up hypertensive urgency, elevated troponin       Interval History: Resting in bed.  No complaints this morning.        Objective   Vital Signs  Temp:  [98.4 °F (36.9 °C)-99.2 °F (37.3 °C)] 98.8 °F (37.1 °C)  Heart Rate:  [61-77] 77  Resp:  [16-18] 16  BP: (117-152)/(70-93) 146/92    Intake/Output Summary (Last 24 hours) at 4/5/2021 1238  Last data filed at 4/5/2021 0000  Gross per 24 hour   Intake 100 ml   Output 300 ml   Net -200 ml           Constitutional:       General: Not in acute distress.     Appearance: Well-developed. Not diaphoretic.   Eyes:      Pupils: Pupils are equal, round, and reactive to light.   HENT:      Head: Normocephalic and atraumatic.   Neck:      Thyroid: No thyromegaly.      Vascular: No JVD.   Pulmonary:      Effort: Pulmonary effort is normal. No respiratory distress.      Breath sounds: Normal breath sounds.   Cardiovascular:      Normal rate. Regular rhythm.   Pulses:     Intact distal pulses.   Abdominal:      General: Bowel sounds are normal. There is no distension.      Palpations: Abdomen is soft. There is no hepatomegaly or splenomegaly.      Tenderness: There is no abdominal tenderness.   Musculoskeletal: Normal range of motion. Skin:     General: Skin is warm and dry.      Findings: No erythema.   Neurological:      Mental Status: Alert and oriented to person, place, and time.   Psychiatric:         Behavior: Behavior normal.         Judgment: Judgment normal.         Results Review:      Results from last 7 days   Lab Units 04/05/21  0627 04/04/21  0540 04/03/21  0522   SODIUM mmol/L 140 141 140   POTASSIUM mmol/L 3.4* 3.2* 3.5   CHLORIDE mmol/L 104 104 105   CO2 mmol/L 24.8 24.8 22.7   BUN mg/dL 17 21 24*   CREATININE mg/dL 0.66* 0.79 0.91   GLUCOSE mg/dL 95 93 102*   CALCIUM mg/dL 8.8 8.7 8.7     Results from last 7 days   Lab Units 04/03/21  0522  04/02/21  1410 04/02/21  0508   TROPONIN T ng/mL 0.075* 0.044* 0.112*     Results from last 7 days   Lab Units 04/05/21  0627 04/04/21  0540 04/03/21  0522   WBC 10*3/mm3 10.39 12.56* 12.58*   HEMOGLOBIN g/dL 13.4 13.0 14.5   HEMATOCRIT % 39.9 39.1 42.0   PLATELETS 10*3/mm3 150 152 164     Results from last 7 days   Lab Units 04/01/21  2342   INR  1.10     Results from last 7 days   Lab Units 04/05/21  0627   CHOLESTEROL mg/dL 110     Results from last 7 days   Lab Units 04/01/21  2342   MAGNESIUM mg/dL 2.1     Results from last 7 days   Lab Units 04/05/21  0627   CHOLESTEROL mg/dL 110   TRIGLYCERIDES mg/dL 77   HDL CHOL mg/dL 39*   LDL CHOL mg/dL 55       I reviewed the patient's new clinical results.  I personally viewed and interpreted the patient's EKG/Telemetry data        Medication Review:   amoxicillin-clavulanate, 1 tablet, Oral, Q12H  atorvastatin, 80 mg, Oral, Nightly  atropine, 1 mg, Intravenous, Once  carvedilol, 3.125 mg, Oral, BID With Meals  clopidogrel, 75 mg, Oral, Daily  enoxaparin, 40 mg, Subcutaneous, Q24H  famotidine, 20 mg, Nasogastric, BID AC  furosemide, 40 mg, Oral, Daily             Assessment/Plan       Acute hypoxemic respiratory failure (CMS/HCC)      1.  Acute hypoxic respiratory failure.  Improved.  Last dose of IV Lasix two days ago.  Will start oral Lasix and monitor response.   2.  Hypertensive urgency.  Improved.  Will transition metoprolol to carvedilol for better blood pressure control.  He was also on this at home.   3.  Coronary artery disease.  History of CABG in November 2020.  No chest pain.  I do not see a recent lipid panel.  Will order one.  Will also resume his home dose of atorvastatin 80 mg.  Continue Plavix.  4.  Mitral regurgitation.  Moderate to severe in February 2021.  Echocardiogram a few days ago was poor quality study.  Repeat echocardiogram scheduled for today.  5.  Elevated troponin.  No chest pain.  Likely stress related.  Will see what the echo shows.         Lulu Damon, DAYAMI  04/05/21  12:38 EDT

## 2021-04-05 NOTE — PROGRESS NOTES
Continued Stay Note  [facility]     Patient Name: Harvinder Vega  MRN: [id number]  Today's Date: 4/5/2021    Admit Date: 4/1/2021    Discharge Plan     Row Name 04/05/21 1158       Plan    Plan  Home    Plan Comments  S/W pt at bedside.  He is currently on room air.  He states he has been able to ambulate without difficulty in his room and plans to return home upon DC.  His son Edwin and several friends are able to check in on pt at home.  He denies DC needs at this time.  CCP will continue to follow. .........sk        Discharge Codes    No documentation.             Kamini Millan RN

## 2021-04-05 NOTE — PROGRESS NOTES
Clinical Pharmacy Services: Medication History    Harvinder Vega is a 61 y.o. male presenting to [facility] for Acute respiratory acidosis [E87.2]  Acute pulmonary edema (CMS/HCC) [J81.0]  Acute hypoxemic respiratory failure (CMS/HCC) [J96.01]    He  has a past medical history of Arthritis, Claustrophobia, Coronary artery disease, Herniated intervertebral disc of lumbar spine, Hyperlipidemia, and Low back pain.    Allergies as of 04/01/2021    (No Known Allergies)       Medication information was obtained from: patient and pharmacy  Pharmacy and Phone Number: [facility] #[id number]   [phone]    Prior to Admission Medications       Prescriptions Last Dose Informant Patient Reported? Taking?    atorvastatin (LIPITOR) 80 MG tablet  Self Yes Yes    Take 1 tablet by mouth Daily.    carvedilol (COREG) 12.5 MG tablet  Pharmacy Yes Yes    Take 6.25 mg by mouth 2 (two) times a day.    clopidogrel (PLAVIX) 75 MG tablet  Self Yes Yes    Take 75 mg by mouth Daily.    nabumetone (RELAFEN) 500 MG tablet  Self Yes Yes    Take 500 mg by mouth 2 (Two) Times a Day.    naproxen sodium (ALEVE) 220 MG tablet  Self Yes Yes    Take 220 mg by mouth 2 (Two) Times a Day As Needed for Mild Pain .    pregabalin (LYRICA) 150 MG capsule  Self Yes Yes    Take 1 capsule by mouth 2 (Two) Times a Day.              Medication notes:   Discontinued gabapentin, etodolac and Lyrica at 75mg bid(increased dose)    This medication list is complete to the best of my knowledge as of 4/5/2021    Please call if questions.    Elana Marina, PharmD, BCACP  4/5/2021 15:30 EDT

## 2021-04-05 NOTE — PLAN OF CARE
Problem: Skin Injury Risk Increased  Goal: Skin Health and Integrity  Outcome: Ongoing, Progressing  Intervention: Optimize Skin Protection  Recent Flowsheet Documentation  Taken 4/5/2021 0839 by Maria Del Carmen Ramos RN  Pressure Reduction Techniques: frequent weight shift encouraged  Head of Bed (HOB): HOB at 45 degrees  Pressure Reduction Devices: pressure-redistributing mattress utilized     Problem: Fall Injury Risk  Goal: Absence of Fall and Fall-Related Injury  Outcome: Ongoing, Progressing  Intervention: Identify and Manage Contributors to Fall Injury Risk  Recent Flowsheet Documentation  Taken 4/5/2021 1416 by Maria Del Carmen Ramos RN  Medication Review/Management: medications reviewed  Taken 4/5/2021 0839 by Maria Del Carmen Ramos RN  Medication Review/Management: medications reviewed  Intervention: Promote Injury-Free Environment  Recent Flowsheet Documentation  Taken 4/5/2021 1416 by Maria Del Carmen Ramos RN  Safety Promotion/Fall Prevention:   safety round/check completed   room organization consistent   nonskid shoes/slippers when out of bed   fall prevention program maintained   clutter free environment maintained   assistive device/personal items within reach   activity supervised  Taken 4/5/2021 1208 by Maria Del Carmen Ramos RN  Safety Promotion/Fall Prevention:   safety round/check completed   room organization consistent   nonskid shoes/slippers when out of bed   fall prevention program maintained   clutter free environment maintained   assistive device/personal items within reach   activity supervised  Taken 4/5/2021 1050 by Maria Del Carmen Ramos RN  Safety Promotion/Fall Prevention:   safety round/check completed   room organization consistent   nonskid shoes/slippers when out of bed   fall prevention program maintained   clutter free environment maintained   assistive device/personal items within reach   activity supervised  Taken 4/5/2021 0839 by Maria Del Carmen Ramos RN  Safety Promotion/Fall Prevention:   safety  round/check completed   room organization consistent   nonskid shoes/slippers when out of bed   fall prevention program maintained   clutter free environment maintained   assistive device/personal items within reach   activity supervised  Taken 4/5/2021 0735 by Maria Del Carmen Ramos, RN  Safety Promotion/Fall Prevention:   safety round/check completed   room organization consistent   nonskid shoes/slippers when out of bed   fall prevention program maintained   clutter free environment maintained   assistive device/personal items within reach   activity supervised     Problem: Adult Inpatient Plan of Care  Goal: Plan of Care Review  Outcome: Ongoing, Progressing  Flowsheets (Taken 4/5/2021 1547)  Progress: improving  Plan of Care Reviewed With: patient  Outcome Summary: Pt transferred out of ICU this morning. On room air. VSS. Awaiting echo and cardio recs. Up ad chelsea. Plan to d/c home tomorrow per MD notes.  Goal: Patient-Specific Goal (Individualized)  Outcome: Ongoing, Progressing  Goal: Absence of Hospital-Acquired Illness or Injury  Outcome: Ongoing, Progressing  Intervention: Identify and Manage Fall Risk  Recent Flowsheet Documentation  Taken 4/5/2021 1416 by Maria Del Carmen Ramos, RN  Safety Promotion/Fall Prevention:   safety round/check completed   room organization consistent   nonskid shoes/slippers when out of bed   fall prevention program maintained   clutter free environment maintained   assistive device/personal items within reach   activity supervised  Taken 4/5/2021 1208 by Maria Del Carmen Ramos, RN  Safety Promotion/Fall Prevention:   safety round/check completed   room organization consistent   nonskid shoes/slippers when out of bed   fall prevention program maintained   clutter free environment maintained   assistive device/personal items within reach   activity supervised  Taken 4/5/2021 1050 by Maria Del Carmen Ramos, RN  Safety Promotion/Fall Prevention:   safety round/check completed   room organization  consistent   nonskid shoes/slippers when out of bed   fall prevention program maintained   clutter free environment maintained   assistive device/personal items within reach   activity supervised  Taken 4/5/2021 0839 by Maria Del Carmen Ramos RN  Safety Promotion/Fall Prevention:   safety round/check completed   room organization consistent   nonskid shoes/slippers when out of bed   fall prevention program maintained   clutter free environment maintained   assistive device/personal items within reach   activity supervised  Taken 4/5/2021 0735 by Maria Del Carmen Ramos RN  Safety Promotion/Fall Prevention:   safety round/check completed   room organization consistent   nonskid shoes/slippers when out of bed   fall prevention program maintained   clutter free environment maintained   assistive device/personal items within reach   activity supervised  Intervention: Prevent Skin Injury  Recent Flowsheet Documentation  Taken 4/5/2021 1416 by Maria Del Carmen Ramos RN  Body Position: position changed independently  Taken 4/5/2021 1208 by Maria Del Carmen Ramos RN  Body Position: (up in chair)   position changed independently   other (see comments)  Taken 4/5/2021 1050 by Maria Del Carmen Ramos RN  Body Position: position changed independently  Taken 4/5/2021 0839 by Maria Del Carmen Ramos RN  Body Position:   position changed independently   turned   sitting up in bed  Intervention: Prevent and Manage VTE (venous thromboembolism) Risk  Recent Flowsheet Documentation  Taken 4/5/2021 0839 by Maria Del Carmen Ramos RN  VTE Prevention/Management: (lovenox inj) other (see comments)  Intervention: Prevent Infection  Recent Flowsheet Documentation  Taken 4/5/2021 0839 by Maria Del Carmen Ramos RN  Infection Prevention:   visitors restricted/screened   single patient room provided   rest/sleep promoted   personal protective equipment utilized   hand hygiene promoted   equipment surfaces disinfected  Goal: Optimal Comfort and Wellbeing  Outcome: Ongoing,  Progressing  Intervention: Provide Person-Centered Care  Recent Flowsheet Documentation  Taken 4/5/2021 0839 by Maria Del Carmen Ramos RN  Trust Relationship/Rapport:   care explained   choices provided   emotional support provided   empathic listening provided   questions answered   reassurance provided   thoughts/feelings acknowledged  Goal: Readiness for Transition of Care  Outcome: Ongoing, Progressing     Problem: Gas Exchange Impaired  Goal: Optimal Gas Exchange  Outcome: Ongoing, Progressing  Intervention: Optimize Oxygenation and Ventilation  Recent Flowsheet Documentation  Taken 4/5/2021 0839 by Maria Del Carmen Ramos RN  Head of Bed (HOB): HOB at 45 degrees   Goal Outcome Evaluation:  Plan of Care Reviewed With: patient  Progress: improving  Outcome Summary: Pt transferred out of ICU this morning. On room air. VSS. Awaiting echo and cardio recs. Up ad chelsea. Plan to d/c home tomorrow per MD notes.

## 2021-04-05 NOTE — PROGRESS NOTES
"  Daily Progress Note.   [address]  4/5/2021    Patient:  Name:  Harvinder Vega  MRN:  [id number]  1960  61 y.o.  male         CC:  soa  Summary:  In summary this is a 61-year-old male who presented to the ER with bilateral pulmonary infiltrates is in respiratory distress and acute hypoxic respiratory failure with a history of coronary artery disease CABG mitral valve regurgitation who was intubated in the ER.  He was given IV diuretics and has substantial improvement in his chest x-ray.     Interval History:  Doing well on room air.  No chest pain no fever no chills.  No significant productive cough occasional cough with some sputum production.  No hemoptysis.  No lower extremity swelling he is able to carry on conversation memory intact speech intact    He does state he has never had really high blood pressures before when he goes to the doctor's his blood pressure seems to be well controlled.  He does state that he is very claustrophobic and that the noninvasive ventilation on the way to the hospital made him very anxious and blood pressure through the roof.  ROS: No fever, no diarrhea, no chest pain  PMFSSH: no change    Physical Exam:  /87 (BP Location: Right arm, Patient Position: Lying)   Pulse 77   Temp 98.4 °F (36.9 °C) (Oral)   Resp 16   Ht 182.9 cm (72\")   Wt 107 kg (236 lb 8.9 oz)   SpO2 92%   BMI 32.08 kg/m²   Body mass index is 32.08 kg/m².    Intake/Output Summary (Last 24 hours) at 4/5/2021 1304  Last data filed at 4/5/2021 1257  Gross per 24 hour   Intake 340 ml   Output 250 ml   Net 90 ml     General appearance: NAD, conversant   Eyes: anicteric sclerae, moist conjunctivae; no lid-lag; PERRLA  HENT: Atraumatic; oropharynx clear with moist mucous membranes and no mucosal ulcerations; normal hard and soft palate  Neck: Trachea midline; FROM, supple, no thyromegaly or lymphadenopathy  Lungs: CTA, with normal respiratory effort and no intercostal " retractions  CV: RRR, no rub very hard to appreciate any rub either  Abdomen: Soft, non-tender; no masses or HSM  Extremities: No peripheral edema or extremity lymphadenopathy  Skin: Normal temperature, turgor and texture; no rash, ulcers or subcutaneous nodules  Psych: Appropriate affect, alert and oriented to person, place and time  Neuro moves all ext, cns 2-12 intact sensation intact    Data Review:  Notable Labs:  Results from last 7 days   Lab Units 04/05/21 0627 04/04/21 0540 04/03/21 0522 04/02/21 0508 04/01/21  2342   WBC 10*3/mm3 10.39 12.56* 12.58* 19.94* 15.66*   HEMOGLOBIN g/dL 13.4 13.0 14.5 15.2 16.1   PLATELETS 10*3/mm3 150 152 164 184 205     Results from last 7 days   Lab Units 04/05/21 0627 04/04/21 0540 04/03/21 0522 04/02/21 0508 04/01/21  2342   SODIUM mmol/L 140 141 140 138 139   POTASSIUM mmol/L 3.4* 3.2* 3.5 4.1 4.0   CHLORIDE mmol/L 104 104 105 105 104   CO2 mmol/L 24.8 24.8 22.7 21.1* 23.1   BUN mg/dL 17 21 24* 17 15   CREATININE mg/dL 0.66* 0.79 0.91 1.04 0.91   GLUCOSE mg/dL 95 93 102* 110* 195*   CALCIUM mg/dL 8.8 8.7 8.7 8.8 8.5*   MAGNESIUM mg/dL  --   --   --   --  2.1   Estimated Creatinine Clearance: 144.9 mL/min (A) (by C-G formula based on SCr of 0.66 mg/dL (L)).    Results from last 7 days   Lab Units 04/05/21 0627 04/04/21 0540 04/03/21 0522 04/02/21  0010 04/01/21  2342   AST (SGOT) U/L  --   --   --   --  19   ALT (SGPT) U/L  --   --   --   --  21   PROCALCITONIN ng/mL  --  0.08 0.12  --  0.05   LACTATE mmol/L  --   --   --  1.7  --    D DIMER QUANT MCGFEU/mL  --   --   --   --  1.09*   PLATELETS 10*3/mm3 150 152 164  --  205       Results from last 7 days   Lab Units 04/04/21  0312 04/03/21  0444 04/02/21  1139 04/02/21  0043 04/01/21  2358   PH, ARTERIAL pH units 7.400 7.432 7.416 7.262* 7.189*   PCO2, ARTERIAL mm Hg 41.9 35.9 34.6* 51.0* 66.4*   PO2 ART mm Hg 85.0 80.9 90.7 108.2* 81.7   HCO3 ART mmol/L 25.9 23.9 22.3 22.9 25.3       Imaging:  Reviewed chest  images personally from past 3 days    Scheduled meds:    amoxicillin-clavulanate, 1 tablet, Oral, Q12H  atorvastatin, 80 mg, Oral, Nightly  atropine, 1 mg, Intravenous, Once  carvedilol, 3.125 mg, Oral, BID With Meals  clopidogrel, 75 mg, Oral, Daily  enoxaparin, 40 mg, Subcutaneous, Q24H  famotidine, 20 mg, Nasogastric, BID AC  furosemide, 40 mg, Oral, Daily        ASSESSMENT  /  PLAN:  1. Acute pulmonary edema secondary to hypertensive emergency  with mitral valve regurgitation moderate to severe  2. Hypertensive emergency on presentation  3. Hypotension -requiring vasopressors  at 1 point, currently off   4. Acute hypercapnic and hypoxic respiratory failure requiring intubation, liberated on 4/3/2021  5. Moderate to severe MR  6. CAD s/p CABG in November 2020  7. Partial RBBB  8. Nstemi.  9. Suspected sleep apnea    Extubated taking po cont augmentin for course end 4-8-2021  Investigations into mitral valve per cards  BP control, coreg 3.125mg  Lasix po 40mg daily   start potassium 20 daily po  Repeat echocardiogram today with him to get better windows all now that he is off mechanical ventilation.    dispo home tomorrow pending echo and cards evaluation.       Jeffrey Yan MD  [facility]  04/05/21  13:04 EDT

## 2021-04-06 VITALS
RESPIRATION RATE: 16 BRPM | SYSTOLIC BLOOD PRESSURE: 114 MMHG | OXYGEN SATURATION: 93 % | HEIGHT: 72 IN | DIASTOLIC BLOOD PRESSURE: 79 MMHG | WEIGHT: 236 LBS | TEMPERATURE: 98.1 F | HEART RATE: 67 BPM | BODY MASS INDEX: 31.97 KG/M2

## 2021-04-06 LAB
ANION GAP SERPL CALCULATED.3IONS-SCNC: 10.5 MMOL/L (ref 5–15)
BASOPHILS # BLD AUTO: 0.06 10*3/MM3 (ref 0–0.2)
BASOPHILS NFR BLD AUTO: 0.7 % (ref 0–1.5)
BUN SERPL-MCNC: 15 MG/DL (ref 8–23)
BUN/CREAT SERPL: 21.1 (ref 7–25)
CALCIUM SPEC-SCNC: 8.7 MG/DL (ref 8.6–10.5)
CHLORIDE SERPL-SCNC: 101 MMOL/L (ref 98–107)
CO2 SERPL-SCNC: 28.5 MMOL/L (ref 22–29)
CREAT SERPL-MCNC: 0.71 MG/DL (ref 0.76–1.27)
DEPRECATED RDW RBC AUTO: 41.3 FL (ref 37–54)
EOSINOPHIL # BLD AUTO: 0.14 10*3/MM3 (ref 0–0.4)
EOSINOPHIL NFR BLD AUTO: 1.6 % (ref 0.3–6.2)
ERYTHROCYTE [DISTWIDTH] IN BLOOD BY AUTOMATED COUNT: 12.7 % (ref 12.3–15.4)
GFR SERPL CREATININE-BSD FRML MDRD: 112 ML/MIN/1.73
GLUCOSE SERPL-MCNC: 94 MG/DL (ref 65–99)
HCT VFR BLD AUTO: 38.2 % (ref 37.5–51)
HGB BLD-MCNC: 13 G/DL (ref 13–17.7)
IMM GRANULOCYTES # BLD AUTO: 0.04 10*3/MM3 (ref 0–0.05)
IMM GRANULOCYTES NFR BLD AUTO: 0.4 % (ref 0–0.5)
LYMPHOCYTES # BLD AUTO: 2.13 10*3/MM3 (ref 0.7–3.1)
LYMPHOCYTES NFR BLD AUTO: 23.6 % (ref 19.6–45.3)
MCH RBC QN AUTO: 30.7 PG (ref 26.6–33)
MCHC RBC AUTO-ENTMCNC: 34 G/DL (ref 31.5–35.7)
MCV RBC AUTO: 90.3 FL (ref 79–97)
MONOCYTES # BLD AUTO: 1.34 10*3/MM3 (ref 0.1–0.9)
MONOCYTES NFR BLD AUTO: 14.8 % (ref 5–12)
NEUTROPHILS NFR BLD AUTO: 5.32 10*3/MM3 (ref 1.7–7)
NEUTROPHILS NFR BLD AUTO: 58.9 % (ref 42.7–76)
NRBC BLD AUTO-RTO: 0 /100 WBC (ref 0–0.2)
PLATELET # BLD AUTO: 173 10*3/MM3 (ref 140–450)
PMV BLD AUTO: 11.8 FL (ref 6–12)
POTASSIUM SERPL-SCNC: 3.1 MMOL/L (ref 3.5–5.2)
RBC # BLD AUTO: 4.23 10*6/MM3 (ref 4.14–5.8)
SODIUM SERPL-SCNC: 140 MMOL/L (ref 136–145)
WBC # BLD AUTO: 9.03 10*3/MM3 (ref 3.4–10.8)

## 2021-04-06 PROCEDURE — 85025 COMPLETE CBC W/AUTO DIFF WBC: CPT | Performed by: INTERNAL MEDICINE

## 2021-04-06 PROCEDURE — 80048 BASIC METABOLIC PNL TOTAL CA: CPT | Performed by: INTERNAL MEDICINE

## 2021-04-06 PROCEDURE — 99232 SBSQ HOSP IP/OBS MODERATE 35: CPT | Performed by: NURSE PRACTITIONER

## 2021-04-06 RX ORDER — LISINOPRIL 2.5 MG/1
2.5 TABLET ORAL DAILY
Qty: 30 TABLET | Refills: 11 | Status: SHIPPED | OUTPATIENT
Start: 2021-04-06 | End: 2022-03-31 | Stop reason: SDUPTHER

## 2021-04-06 RX ORDER — CARVEDILOL 3.12 MG/1
3.12 TABLET ORAL 2 TIMES DAILY WITH MEALS
Qty: 60 TABLET | Refills: 11 | Status: SHIPPED | OUTPATIENT
Start: 2021-04-06 | End: 2022-02-24

## 2021-04-06 RX ORDER — FUROSEMIDE 40 MG/1
40 TABLET ORAL DAILY
Qty: 30 TABLET | Refills: 11 | Status: SHIPPED | OUTPATIENT
Start: 2021-04-07 | End: 2022-03-28 | Stop reason: SDUPTHER

## 2021-04-06 RX ORDER — AMOXICILLIN AND CLAVULANATE POTASSIUM 875; 125 MG/1; MG/1
1 TABLET, FILM COATED ORAL EVERY 12 HOURS SCHEDULED
Qty: 3 TABLET | Refills: 0 | Status: SHIPPED | OUTPATIENT
Start: 2021-04-06 | End: 2021-04-08

## 2021-04-06 RX ORDER — POTASSIUM CHLORIDE 20 MEQ/1
20 TABLET, EXTENDED RELEASE ORAL DAILY
Qty: 30 TABLET | Refills: 11 | Status: SHIPPED | OUTPATIENT
Start: 2021-04-07 | End: 2021-05-06

## 2021-04-06 RX ADMIN — POTASSIUM CHLORIDE 20 MEQ: 750 TABLET, EXTENDED RELEASE ORAL at 08:50

## 2021-04-06 RX ADMIN — FUROSEMIDE 40 MG: 40 TABLET ORAL at 08:50

## 2021-04-06 RX ADMIN — FAMOTIDINE 20 MG: 20 TABLET, FILM COATED ORAL at 06:37

## 2021-04-06 RX ADMIN — CLOPIDOGREL 75 MG: 75 TABLET, FILM COATED ORAL at 08:50

## 2021-04-06 RX ADMIN — CARVEDILOL 3.12 MG: 3.12 TABLET, FILM COATED ORAL at 08:50

## 2021-04-06 RX ADMIN — AMOXICILLIN AND CLAVULANATE POTASSIUM 1 TABLET: 875; 125 TABLET, FILM COATED ORAL at 08:50

## 2021-04-06 NOTE — DISCHARGE SUMMARY
PHYSICIAN DISCHARGE SUMMARY                                                                          [facility]    Patient Identification:  Name: Harvinder Vega  Age: 61 y.o.  Sex: male  :  1960  MRN: [id number]  Primary Care Physician: Bruno Ricci MD    Admit date: 2021  Discharge date:2021   Discharged Condition: good    Discharge Diagnoses:    Acute hypoxemic respiratory failure (CMS/HCC)  1.  Acute pulmonary edema secondary to hypertensive emergency  with mitral valve regurgitation moderate to severe  2. Hypertensive emergency on presentation  3. Hypotension -requiring vasopressors  at 1 point, currently off   4. Acute hypercapnic and hypoxic respiratory failure requiring intubation, liberated on 4/3/2021  5. Moderate to severe MR  6. CAD s/p CABG in 2020  7. Partial RBBB  8. Nstemi.  Suspected sleep apnea    Hospital Course:   In summary this is a 61-year-old male who presented to the ER with bilateral pulmonary infiltrates is in respiratory distress and acute hypoxic respiratory failure with a history of coronary artery disease CABG mitral valve regurgitation who was intubated in the ER.  He was given IV diuretics and has substantial improvement in his chest x-ray.  He was extubated and weaned to room air.  Prior to discharge he was awake alert denied any soa or chest pain and wished to go home.  He understood need to follow up with cardiology. TTE showed an ef decreased from 60 to 47% however mild MVR where previously had been described as moderate severe MVR. He was cleared for dc by cardiology.    Consults:   IP CONSULT TO PULMONOLOGY  IP CONSULT TO CARDIOLOGY    Consults:   IP CONSULT TO PULMONOLOGY  IP CONSULT TO CARDIOLOGY    Significant Discharge Diagnostics   Procedures Performed:         Pertinent Lab Results:  Results from last 7 days   Lab Units 21  0619 21  0627 21  0540 21  0522  04/02/21  0508 04/01/21  2342   SODIUM mmol/L 140 140 141 140 138 139   POTASSIUM mmol/L 3.1* 3.4* 3.2* 3.5 4.1 4.0   CHLORIDE mmol/L 101 104 104 105 105 104   CO2 mmol/L 28.5 24.8 24.8 22.7 21.1* 23.1   BUN mg/dL 15 17 21 24* 17 15   CREATININE mg/dL 0.71* 0.66* 0.79 0.91 1.04 0.91   GLUCOSE mg/dL 94 95 93 102* 110* 195*   CALCIUM mg/dL 8.7 8.8 8.7 8.7 8.8 8.5*   AST (SGOT) U/L  --   --   --   --   --  19   ALT (SGPT) U/L  --   --   --   --   --  21     Results from last 7 days   Lab Units 04/03/21  0522 04/02/21  1410 04/02/21  0508 04/01/21  2342   TROPONIN T ng/mL 0.075* 0.044* 0.112* <0.010     Results from last 7 days   Lab Units 04/06/21  0619 04/05/21  0627 04/04/21  0540 04/03/21  0522 04/02/21  0508 04/01/21  2342 04/01/21  2342   WBC 10*3/mm3 9.03 10.39 12.56* 12.58* 19.94*  --  15.66*   HEMOGLOBIN g/dL 13.0 13.4 13.0 14.5 15.2  --  16.1   HEMATOCRIT % 38.2 39.9 39.1 42.0 46.2  --  49.9   PLATELETS 10*3/mm3 173 150 152 164 184  --  205   MCV fL 90.3 90.9 90.1 91.5 93.7  --  93.8   MCH pg 30.7 30.5 30.0 31.6 30.8  --  30.3   MCHC g/dL 34.0 33.6 33.2 34.5 32.9  --  32.3   RDW % 12.7 12.6 12.9 13.0 12.9  --  12.8   RDW-SD fl 41.3 41.4 42.9 43.7 44.7  --  43.9   MPV fL 11.8 11.8 11.6 11.6 12.0  --  11.9   NEUTROPHIL % % 58.9 63.0 65.9 60.7 69.7   < >  --    LYMPHOCYTE % % 23.6 20.7 18.4* 24.6 18.7*   < >  --    MONOCYTES % % 14.8* 14.2* 14.4* 13.6* 10.5   < >  --    EOSINOPHIL % % 1.6 1.1 0.3 0.3 0.1*   < >  --    BASOPHIL % % 0.7 0.7 0.5 0.5 0.4   < >  --    IMM GRAN % % 0.4 0.3 0.5 0.3 0.6*   < >  --    NEUTROS ABS 10*3/mm3 5.32 6.55 8.28* 7.64* 13.92*   < > 10.32*   LYMPHS ABS 10*3/mm3 2.13 2.15 2.31 3.09 3.72*   < >  --    MONOS ABS 10*3/mm3 1.34* 1.48* 1.81* 1.71* 2.09*   < >  --    EOS ABS 10*3/mm3 0.14 0.11 0.04 0.04 0.02   < >  --    BASOS ABS 10*3/mm3 0.06 0.07 0.06 0.06 0.08   < > 0.17   IMMATURE GRANS (ABS) 10*3/mm3 0.04 0.03 0.06* 0.04 0.11*   < >  --    NRBC /100 WBC 0.0 0.0 0.0 0.0 0.0  --  0.0     < > = values in this interval not displayed.     Results from last 7 days   Lab Units 04/01/21  2342   INR  1.10     Results from last 7 days   Lab Units 04/01/21  2342   MAGNESIUM mg/dL 2.1     Results from last 7 days   Lab Units 04/05/21  0627   CHOLESTEROL mg/dL 110   TRIGLYCERIDES mg/dL 77   HDL CHOL mg/dL 39*     Results from last 7 days   Lab Units 04/01/21  2342   PROBNP pg/mL 634.7     Results from last 7 days   Lab Units 04/01/21  2342   TSH uIU/mL 1.960     Results from last 7 days   Lab Units 04/04/21  0312   PH, ARTERIAL pH units 7.400   PO2 ART mm Hg 85.0   PCO2, ARTERIAL mm Hg 41.9   HCO3 ART mmol/L 25.9     Results from last 7 days   Lab Units 04/04/21  0540 04/03/21  0522 04/02/21  0010 04/01/21  2342   PROCALCITONIN ng/mL 0.08 0.12  --  0.05   LACTATE mmol/L  --   --  1.7  --              Results from last 7 days   Lab Units 04/03/21  1413 04/02/21  0055 04/02/21  0020   BLOODCX   --  No growth at 4 days No growth at 4 days   RESPCX  Light growth (2+) Normal Respiratory Reina  --   --          Results from last 7 days   Lab Units 04/01/21  2342   ADENOVIRUS DETECTION BY PCR  Not Detected   CORONAVIRUS 229E  Not Detected   CORONAVIRUS HKU1  Not Detected   CORONAVIRUS NL63  Not Detected   CORONAVIRUS OC43  Not Detected   HUMAN METAPNEUMOVIRUS  Not Detected   HUMAN RHINOVIRUS/ENTEROVIRUS  Not Detected   INFLUENZA B PCR  Not Detected   PARAINFLUENZA 1  Not Detected   PARAINFLUENZA VIRUS 2  Not Detected   PARAINFLUENZA VIRUS 3  Not Detected   PARAINFLUENZA VIRUS 4  Not Detected   BORDETELLA PERTUSSIS PCR  Not Detected   CHLAMYDOPHILA PNEUMONIAE PCR  Not Detected   MYCOPLAMA PNEUMO PCR  Not Detected   INFLUENZA A PCR  Not Detected   RSV, PCR  Not Detected           Imaging Results:  Imaging Results (All)     Procedure Component Value Units Date/Time    XR Chest 1 View [[id number]] Collected: 04/04/21 0530     Updated: 04/04/21 0535    Narrative:      SINGLE VIEW OF THE CHEST     HISTORY: Respiratory  failure     COMPARISON: 04/03/2021     FINDINGS:  Endotracheal tube has been removed, as has a weighted enteric feeding  tube. Cardiomegaly is present. There is some vascular congestion,  although improved prior exam. There is persistent right basilar  atelectasis. Aeration at the left lung base appears improved. There is a  small left pleural effusion. Right costophrenic angle cannot be  assessed.       Impression:      Improved aeration at the left lung base. Persistent right basilar  consolidation.     This report was finalized on 4/4/2021 5:31 AM by Dr. Rasheeda Rees M.D.       XR Chest 1 View [[id number]] Collected: 04/03/21 0424     Updated: 04/03/21 0428    Narrative:      SINGLE VIEW OF THE CHEST     HISTORY: Respiratory failure     COMPARISON: 04/02/2021     FINDINGS:  Tubes and lines are stable in position. There is cardiomegaly. There is  some vascular congestion. Aeration at the right lung base appears  improved. Left basilar consolidation is unchanged. There is no  pneumothorax. Bilateral pleural effusions are suspected.       Impression:      Interval improvement in aeration at the right lung base.     This report was finalized on 4/3/2021 4:25 AM by Dr. Rasheeda Rees M.D.       XR Abdomen KUB [[id number]] Collected: 04/02/21 1035     Updated: 04/02/21 1041    Narrative:      ONE VIEW PORTABLE ABDOMEN AT 10:29 AM     HISTORY: Feeding tube placement     FINDINGS: The feeding tube ends in the lower stomach.     This report was finalized on 4/2/2021 10:38 AM by Dr. Keven Carvalho M.D.       XR Chest 1 View [[id number]] Collected: 04/02/21 0521     Updated: 04/02/21 0521    Narrative:        Patient: JAYDA GALLEGOS  Time Out: 05:21  Exam(s): FILM CXR 1 VIEW     EXAM:    XR Chest, 1 View    CLINICAL HISTORY:     Follow-up pulmonary edema  Reason for exam: Follow-up pulmonary edema.    TECHNIQUE:    Frontal view of the chest.    COMPARISON:    04 01 21    FINDINGS:      ETT terminates above the arelis.  " Add increase and confluent airspace   disease right mid and right lower lobe.  Findings most suggestive of   pneumonia.  Less extensive airspace disease on the left.  There is at   least mild underlying pulmonary vascular congestion.  Negative for   pneumothorax.      Impression:          Electronically signed by Michelet Miner DO on 04-02-21 at 0521    XR Chest 1 View [[id number]] Collected: 04/02/21 0019     Updated: 04/02/21 0019    Narrative:        Patient: JAYDA GALLEGOS  Time Out: 00:17  Exam(s): FILM CXR 1 VIEW     EXAM:    XR Chest, 1 View    CLINICAL HISTORY:     ett placement  Reason for exam: ett placement.    TECHNIQUE:    Frontal view of the chest.    COMPARISON:    No relevant prior studies available.    FINDINGS:      ETT terminates above the arelis.  There is borderline to mild cardiac   enlargement.  Bilateral diffuse patchy airspace disease, more confluent   in the right infrahilar region.  Findings suggest bilateral pneumonia   with superimposed pulmonary vascular congestion.  Follow-up to resolution   recommended.  Negative for pneumothorax.      Impression:          Electronically signed by Michelet Miner DO on 04-02-21 at 0017          Discharge Exam:  /71 (BP Location: Right arm, Patient Position: Lying)   Pulse 67   Temp 98.6 °F (37 °C) (Oral)   Resp 16   Ht 182.9 cm (72\")   Wt 107 kg (236 lb)   SpO2 93%   BMI 32.01 kg/m²   General appearance: NAD, conversant   Eyes: anicteric sclerae, moist conjunctivae; no lid-lag; PERRLA  HENT: Atraumatic; oropharynx clear with moist mucous membranes and no mucosal ulcerations; normal hard and soft palate  Neck: Trachea midline; FROM, supple, no thyromegaly or lymphadenopathy  Lungs: CTA, with normal respiratory effort and no intercostal retractions  CV: RRR, no rub very hard to appreciate any rub either  Abdomen: Soft, non-tender; no masses or HSM  Extremities: No peripheral edema or extremity lymphadenopathy  Skin: Normal temperature, " turgor and texture; no rash, ulcers or subcutaneous nodules  Psych: Appropriate affect, alert and oriented to person, place and time  Neuro moves all ext, cns 2-12 intact sensation intact       Discharge Disposition  Home or Self Care    Discharge Medications     Discharge Medications      New Medications      Instructions Start Date   amoxicillin-clavulanate 875-125 MG per tablet  Commonly known as: AUGMENTIN   1 tablet, Oral, Every 12 Hours Scheduled      furosemide 40 MG tablet  Commonly known as: LASIX   40 mg, Oral, Daily   Start Date: April 7, 2021     lisinopril 2.5 MG tablet  Commonly known as: PRINIVIL,ZESTRIL   2.5 mg, Oral, Daily      potassium chloride 20 MEQ CR tablet  Commonly known as: K-DUR,KLOR-CON   20 mEq, Oral, Daily   Start Date: April 7, 2021        Changes to Medications      Instructions Start Date   carvedilol 3.125 MG tablet  Commonly known as: COREG  What changed:   · medication strength  · how much to take  · when to take this   3.125 mg, Oral, 2 Times Daily With Meals         Continue These Medications      Instructions Start Date   atorvastatin 80 MG tablet  Commonly known as: LIPITOR   1 tablet, Oral, Daily      clopidogrel 75 MG tablet  Commonly known as: PLAVIX   75 mg, Oral, Daily      pregabalin 150 MG capsule  Commonly known as: LYRICA   1 capsule, Oral, 2 Times Daily         Stop These Medications    nabumetone 500 MG tablet  Commonly known as: RELAFEN     naproxen sodium 220 MG tablet  Commonly known as: ALEVE            Discharge Diet: low fat, low cholesterol diet    Activity at Discharge: as tolerated    Follow-up Information     James East MD Follow up in 1 month(s).    Specialty: Cardiology  Contact information:  [facility] [address] [address] [phone]             Lulu Brown APRN Follow up in 1 week(s).    Specialties: Nurse Practitioner, Cardiology  Contact information:  [address]  [address]  [phone]             Bruno Ricci MD .    Specialty: Internal Medicine  Contact information:  [address] [address]  [phone]                   Total time spent discharging patient including evaluation, medication reconciliation, arranging follow up, and post hospitalization instructions and education total time exceeds 30 minutes.    Signed:  Jeffrey Yan MD  4/6/2021  12:45 EDT

## 2021-04-06 NOTE — PROGRESS NOTES
Hospital Follow Up    LOS:  LOS: 4 days   Patient Name: Harvinder Vega  Age/Sex: 61 y.o. male  : 1960  MRN: [id number]    Day of Service: 21   Length of Stay: 4  Encounter Provider: DAYAMI Monet  Place of Service: [facility] CARDIOLOGY  Patient Care Team:  Bruno Ricci MD as PCP - General (Internal Medicine)    Subjective:     Chief Complaint: Recent CABG/ Mild cardiomyopathy/ MR/ HTN    Interval History: breathing better. Wants to go home.    Objective:     Objective:  Temp:  [98.2 °F (36.8 °C)-98.6 °F (37 °C)] 98.6 °F (37 °C)  Heart Rate:  [67-81] 67  Resp:  [16] 16  BP: (105-156)/(67-92) 111/71     Intake/Output Summary (Last 24 hours) at 2021 1137  Last data filed at 2021 1257  Gross per 24 hour   Intake 240 ml   Output --   Net 240 ml     Body mass index is 32.01 kg/m².      21  2327 21  0500 21  1710   Weight: 113 kg (250 lb) 107 kg (236 lb 8.9 oz) 107 kg (236 lb)     Weight change:     Physical Exam:   General Appearance:    Awake alert and oriented in no acute distress.   Color:  Skin:  Neuro:  HEENT:    Lungs:     Pink  Warm and dry  No focal, motor or sensory deficits  Neck supple, pupils equal, round and reactive. No JVD, No Bruit  Clear to auscultation,respirations regular, even and                  unlabored    Heart:    Regular rate and rhythm, S1 and S2, no murmur, no gallop, no rub. No edema, DP/PT pulses are 2+   Chest Wall:    No abnormalities observed   Abdomen:     Normal bowel sounds, no masses, no organomegaly, soft        non-tender, non-distended, no guarding, no ascites noted   Extremities:   Moves all extremities well, no edema, no cyanosis, no redness       Lab Review:   Results from last 7 days   Lab Units 21  0619 21  0627 21  2342   SODIUM mmol/L 140 140 139   POTASSIUM mmol/L 3.1* 3.4* 4.0   CHLORIDE mmol/L 101 104 104   CO2 mmol/L 28.5 24.8 23.1   BUN mg/dL 15 17 15   CREATININE  mg/dL 0.71* 0.66* 0.91   GLUCOSE mg/dL 94 95 195*   CALCIUM mg/dL 8.7 8.8 8.5*   AST (SGOT) U/L  --   --  19   ALT (SGPT) U/L  --   --  21     Results from last 7 days   Lab Units 04/03/21  0522 04/02/21  1410 04/02/21  0508 04/01/21  2342   TROPONIN T ng/mL 0.075* 0.044* 0.112* <0.010     Results from last 7 days   Lab Units 04/06/21  0619 04/05/21  0627   WBC 10*3/mm3 9.03 10.39   HEMOGLOBIN g/dL 13.0 13.4   HEMATOCRIT % 38.2 39.9   PLATELETS 10*3/mm3 173 150     Results from last 7 days   Lab Units 04/01/21  2342   INR  1.10     Results from last 7 days   Lab Units 04/01/21  2342   MAGNESIUM mg/dL 2.1     Results from last 7 days   Lab Units 04/05/21  0627   CHOLESTEROL mg/dL 110   TRIGLYCERIDES mg/dL 77   HDL CHOL mg/dL 39*     Results from last 7 days   Lab Units 04/01/21  2342   PROBNP pg/mL 634.7     Results from last 7 days   Lab Units 04/01/21  2342   TSH uIU/mL 1.960     I reviewed the patient's new clinical results.  I personally viewed and interpreted the patient's EKG  Current Medications:   Scheduled Meds:amoxicillin-clavulanate, 1 tablet, Oral, Q12H  atorvastatin, 80 mg, Oral, Nightly  carvedilol, 3.125 mg, Oral, BID With Meals  clopidogrel, 75 mg, Oral, Daily  enoxaparin, 40 mg, Subcutaneous, Q24H  famotidine, 20 mg, Nasogastric, BID AC  furosemide, 40 mg, Oral, Daily  potassium chloride, 20 mEq, Oral, Daily      Continuous Infusions:     Allergies:  No Known Allergies    Assessment:       Acute hypoxemic respiratory failure (CMS/HCC)    1.  Acute hypoxic respiratory failure  2.  Hypertensive urgency  3.  Coronary disease with history of CABG November 2020  4.  Mild MR per echocardiogram done today  5.  Elevated troponin in the setting of hypoxia.    Plan:      Overall appears clinically stable.  MR on echo appears mild and EF is about 47%.  Would not make any changes at this time.  BP is also stable.  Okay for discharge from our standpoint.  Follow-up in the office.    Katie Rod,  DAYAMI  04/06/21  11:37 EDT  Electronically signed by Katie Rod, DAYAMI, 04/06/21, 11:37 AM EDT.

## 2021-04-07 ENCOUNTER — APPOINTMENT (OUTPATIENT)
Dept: CARDIAC REHAB | Facility: HOSPITAL | Age: 61
End: 2021-04-07

## 2021-04-07 ENCOUNTER — READMISSION MANAGEMENT (OUTPATIENT)
Dept: CALL CENTER | Facility: HOSPITAL | Age: 61
End: 2021-04-07

## 2021-04-07 LAB
BACTERIA SPEC AEROBE CULT: NORMAL
BACTERIA SPEC AEROBE CULT: NORMAL

## 2021-04-07 NOTE — OUTREACH NOTE
Prep Survey      Responses   [facility] facility patient discharged from?  [facility]   Is LACE score < 7 ?  No   Emergency Room discharge w/ pulse ox?  No   Eligibility  Readm Mgmt   Discharge diagnosis    Acute hypoxemic respiratory failure    Does the patient have one of the following disease processes/diagnoses(primary or secondary)?  Other   Does the patient have Home health ordered?  No   Is there a DME ordered?  No   Prep survey completed?  Yes          Kristina Duke RN

## 2021-04-07 NOTE — PROGRESS NOTES
Case Management Discharge Note      Final Note: DC home         Selected Continued Care - Discharged on 4/6/2021 Admission date: 4/1/2021 - Discharge disposition: Home or Self Care    Destination    No services have been selected for the patient.              Durable Medical Equipment    No services have been selected for the patient.              Dialysis/Infusion    No services have been selected for the patient.              Home Medical Care    No services have been selected for the patient.              Therapy    No services have been selected for the patient.              Community Resources    No services have been selected for the patient.                       Final Discharge Disposition Code: 01 - home or self-care

## 2021-04-07 NOTE — PAYOR COMM NOTE
"Harvinder Vega (61 y.o. Male)     PLEASE SEE ATTACHED DC SUMMARY    REF#[id number]    THANK YOU    OWEN SAN LPN CCP    Date of Birth Social Security Number Address Home Phone MRN    1960  [address]  [facility] [address] [phone] [id number]    Anabaptism Marital Status          Unknown        Admission Date Admission Type Admitting Provider Attending Provider Department, Room/Bed    21 Emergency Angelica Hunt MD  [facility]    Discharge Date Discharge Disposition Discharge Destination        2021 Home or Self Care              Attending Provider: (none)   Allergies: No Known Allergies    Isolation: None   Infection: None   Code Status: Prior    Ht: 182.9 cm (72\")   Wt: 107 kg (236 lb)    Admission Cmt: None   Principal Problem: None                Active Insurance as of 2021     Primary Coverage     Payor Plan Insurance Group Employer/Plan Group    ANTH BLUE CROSS ANTH BLUE CROSS BLUE SHIELD PPO [id number]     Payor Plan Address Payor Plan Phone Number Payor Plan Fax Number Effective Dates    [address] [phone]  2016 - None Entered    [place] [address]       Subscriber Name Subscriber Birth Date Member ID       HARVINDER VEGA 1960 [id number]                 Emergency Contacts      (Rel.) Home Phone Work Phone Mobile Phone    JESUS VEGA (Son) -- -- [phone]               Discharge Summary      Jeffrey Yan MD at 21 1245                                                          PHYSICIAN DISCHARGE SUMMARY                                                                          [facility]    Patient Identification:  Name: Harvinder Vega  Age: 61 y.o.  Sex: male  :  1960  MRN: [id number]  Primary Care Physician: Bruno Ricci MD    Admit date: 2021  Discharge date:2021   Discharged Condition: good    Discharge Diagnoses:    Acute hypoxemic " respiratory failure (CMS/HCC)  1.  Acute pulmonary edema secondary to hypertensive emergency  with mitral valve regurgitation moderate to severe  2. Hypertensive emergency on presentation  3. Hypotension -requiring vasopressors  at 1 point, currently off   4. Acute hypercapnic and hypoxic respiratory failure requiring intubation, liberated on 4/3/2021  5. Moderate to severe MR  6. CAD s/p CABG in November 2020  7. Partial RBBB  8. Nstemi.  Suspected sleep apnea    Hospital Course:   In summary this is a 61-year-old male who presented to the ER with bilateral pulmonary infiltrates is in respiratory distress and acute hypoxic respiratory failure with a history of coronary artery disease CABG mitral valve regurgitation who was intubated in the ER.  He was given IV diuretics and has substantial improvement in his chest x-ray.  He was extubated and weaned to room air.  Prior to discharge he was awake alert denied any soa or chest pain and wished to go home.  He understood need to follow up with cardiology. TTE showed an ef decreased from 60 to 47% however mild MVR where previously had been described as moderate severe MVR. He was cleared for dc by cardiology.    Consults:   IP CONSULT TO PULMONOLOGY  IP CONSULT TO CARDIOLOGY    Consults:   IP CONSULT TO PULMONOLOGY  IP CONSULT TO CARDIOLOGY    Significant Discharge Diagnostics   Procedures Performed:         Pertinent Lab Results:  Results from last 7 days   Lab Units 04/06/21  0619 04/05/21  0627 04/04/21  0540 04/03/21  0522 04/02/21  0508 04/01/21  2342   SODIUM mmol/L 140 140 141 140 138 139   POTASSIUM mmol/L 3.1* 3.4* 3.2* 3.5 4.1 4.0   CHLORIDE mmol/L 101 104 104 105 105 104   CO2 mmol/L 28.5 24.8 24.8 22.7 21.1* 23.1   BUN mg/dL 15 17 21 24* 17 15   CREATININE mg/dL 0.71* 0.66* 0.79 0.91 1.04 0.91   GLUCOSE mg/dL 94 95 93 102* 110* 195*   CALCIUM mg/dL 8.7 8.8 8.7 8.7 8.8 8.5*   AST (SGOT) U/L  --   --   --   --   --  19   ALT (SGPT) U/L  --   --   --   --   --   21     Results from last 7 days   Lab Units 04/03/21  0522 04/02/21  1410 04/02/21  0508 04/01/21  2342   TROPONIN T ng/mL 0.075* 0.044* 0.112* <0.010     Results from last 7 days   Lab Units 04/06/21  0619 04/05/21  0627 04/04/21  0540 04/03/21  0522 04/02/21  0508 04/01/21  2342 04/01/21  2342   WBC 10*3/mm3 9.03 10.39 12.56* 12.58* 19.94*  --  15.66*   HEMOGLOBIN g/dL 13.0 13.4 13.0 14.5 15.2  --  16.1   HEMATOCRIT % 38.2 39.9 39.1 42.0 46.2  --  49.9   PLATELETS 10*3/mm3 173 150 152 164 184  --  205   MCV fL 90.3 90.9 90.1 91.5 93.7  --  93.8   MCH pg 30.7 30.5 30.0 31.6 30.8  --  30.3   MCHC g/dL 34.0 33.6 33.2 34.5 32.9  --  32.3   RDW % 12.7 12.6 12.9 13.0 12.9  --  12.8   RDW-SD fl 41.3 41.4 42.9 43.7 44.7  --  43.9   MPV fL 11.8 11.8 11.6 11.6 12.0  --  11.9   NEUTROPHIL % % 58.9 63.0 65.9 60.7 69.7   < >  --    LYMPHOCYTE % % 23.6 20.7 18.4* 24.6 18.7*   < >  --    MONOCYTES % % 14.8* 14.2* 14.4* 13.6* 10.5   < >  --    EOSINOPHIL % % 1.6 1.1 0.3 0.3 0.1*   < >  --    BASOPHIL % % 0.7 0.7 0.5 0.5 0.4   < >  --    IMM GRAN % % 0.4 0.3 0.5 0.3 0.6*   < >  --    NEUTROS ABS 10*3/mm3 5.32 6.55 8.28* 7.64* 13.92*   < > 10.32*   LYMPHS ABS 10*3/mm3 2.13 2.15 2.31 3.09 3.72*   < >  --    MONOS ABS 10*3/mm3 1.34* 1.48* 1.81* 1.71* 2.09*   < >  --    EOS ABS 10*3/mm3 0.14 0.11 0.04 0.04 0.02   < >  --    BASOS ABS 10*3/mm3 0.06 0.07 0.06 0.06 0.08   < > 0.17   IMMATURE GRANS (ABS) 10*3/mm3 0.04 0.03 0.06* 0.04 0.11*   < >  --    NRBC /100 WBC 0.0 0.0 0.0 0.0 0.0  --  0.0    < > = values in this interval not displayed.     Results from last 7 days   Lab Units 04/01/21  2342   INR  1.10     Results from last 7 days   Lab Units 04/01/21  2342   MAGNESIUM mg/dL 2.1     Results from last 7 days   Lab Units 04/05/21  0627   CHOLESTEROL mg/dL 110   TRIGLYCERIDES mg/dL 77   HDL CHOL mg/dL 39*     Results from last 7 days   Lab Units 04/01/21  2342   PROBNP pg/mL 634.7     Results from last 7 days   Lab Units 04/01/21  2342    TSH uIU/mL 1.960     Results from last 7 days   Lab Units 04/04/21  0312   PH, ARTERIAL pH units 7.400   PO2 ART mm Hg 85.0   PCO2, ARTERIAL mm Hg 41.9   HCO3 ART mmol/L 25.9     Results from last 7 days   Lab Units 04/04/21  0540 04/03/21  0522 04/02/21  0010 04/01/21  2342   PROCALCITONIN ng/mL 0.08 0.12  --  0.05   LACTATE mmol/L  --   --  1.7  --              Results from last 7 days   Lab Units 04/03/21  1413 04/02/21  0055 04/02/21  0020   BLOODCX   --  No growth at 4 days No growth at 4 days   RESPCX  Light growth (2+) Normal Respiratory Reina  --   --          Results from last 7 days   Lab Units 04/01/21  2342   ADENOVIRUS DETECTION BY PCR  Not Detected   CORONAVIRUS 229E  Not Detected   CORONAVIRUS HKU1  Not Detected   CORONAVIRUS NL63  Not Detected   CORONAVIRUS OC43  Not Detected   HUMAN METAPNEUMOVIRUS  Not Detected   HUMAN RHINOVIRUS/ENTEROVIRUS  Not Detected   INFLUENZA B PCR  Not Detected   PARAINFLUENZA 1  Not Detected   PARAINFLUENZA VIRUS 2  Not Detected   PARAINFLUENZA VIRUS 3  Not Detected   PARAINFLUENZA VIRUS 4  Not Detected   BORDETELLA PERTUSSIS PCR  Not Detected   CHLAMYDOPHILA PNEUMONIAE PCR  Not Detected   MYCOPLAMA PNEUMO PCR  Not Detected   INFLUENZA A PCR  Not Detected   RSV, PCR  Not Detected           Imaging Results:  Imaging Results (All)     Procedure Component Value Units Date/Time    XR Chest 1 View [[id number]] Collected: 04/04/21 0530     Updated: 04/04/21 0535    Narrative:      SINGLE VIEW OF THE CHEST     HISTORY: Respiratory failure     COMPARISON: 04/03/2021     FINDINGS:  Endotracheal tube has been removed, as has a weighted enteric feeding  tube. Cardiomegaly is present. There is some vascular congestion,  although improved prior exam. There is persistent right basilar  atelectasis. Aeration at the left lung base appears improved. There is a  small left pleural effusion. Right costophrenic angle cannot be  assessed.       Impression:      Improved aeration at the left  lung base. Persistent right basilar  consolidation.     This report was finalized on 4/4/2021 5:31 AM by Dr. Rasheeda Rees M.D.       XR Chest 1 View [[id number]] Collected: 04/03/21 0424     Updated: 04/03/21 0428    Narrative:      SINGLE VIEW OF THE CHEST     HISTORY: Respiratory failure     COMPARISON: 04/02/2021     FINDINGS:  Tubes and lines are stable in position. There is cardiomegaly. There is  some vascular congestion. Aeration at the right lung base appears  improved. Left basilar consolidation is unchanged. There is no  pneumothorax. Bilateral pleural effusions are suspected.       Impression:      Interval improvement in aeration at the right lung base.     This report was finalized on 4/3/2021 4:25 AM by Dr. Rasheeda Rees M.D.       XR Abdomen KUB [[id number]] Collected: 04/02/21 1035     Updated: 04/02/21 1041    Narrative:      ONE VIEW PORTABLE ABDOMEN AT 10:29 AM     HISTORY: Feeding tube placement     FINDINGS: The feeding tube ends in the lower stomach.     This report was finalized on 4/2/2021 10:38 AM by Dr. Keven Carvalho M.D.       XR Chest 1 View [[id number]] Collected: 04/02/21 0521     Updated: 04/02/21 0521    Narrative:        Patient: JAYDA GALLEGOS  Time Out: 05:21  Exam(s): FILM CXR 1 VIEW     EXAM:    XR Chest, 1 View    CLINICAL HISTORY:     Follow-up pulmonary edema  Reason for exam: Follow-up pulmonary edema.    TECHNIQUE:    Frontal view of the chest.    COMPARISON:    04 01 21    FINDINGS:      ETT terminates above the arelis.  Add increase and confluent airspace   disease right mid and right lower lobe.  Findings most suggestive of   pneumonia.  Less extensive airspace disease on the left.  There is at   least mild underlying pulmonary vascular congestion.  Negative for   pneumothorax.      Impression:          Electronically signed by Michelet Miner DO on 04-02-21 at 0521    XR Chest 1 View [[id number]] Collected: 04/02/21 0019     Updated: 04/02/21 0019    Narrative:    "     Patient: JAYDA GALLEGOS  Time Out: 00:17  Exam(s): FILM CXR 1 VIEW     EXAM:    XR Chest, 1 View    CLINICAL HISTORY:     ett placement  Reason for exam: ett placement.    TECHNIQUE:    Frontal view of the chest.    COMPARISON:    No relevant prior studies available.    FINDINGS:      ETT terminates above the arelis.  There is borderline to mild cardiac   enlargement.  Bilateral diffuse patchy airspace disease, more confluent   in the right infrahilar region.  Findings suggest bilateral pneumonia   with superimposed pulmonary vascular congestion.  Follow-up to resolution   recommended.  Negative for pneumothorax.      Impression:          Electronically signed by Michelet Miner DO on 04-02-21 at 0017          Discharge Exam:  /71 (BP Location: Right arm, Patient Position: Lying)   Pulse 67   Temp 98.6 °F (37 °C) (Oral)   Resp 16   Ht 182.9 cm (72\")   Wt 107 kg (236 lb)   SpO2 93%   BMI 32.01 kg/m²   General appearance: NAD, conversant   Eyes: anicteric sclerae, moist conjunctivae; no lid-lag; PERRLA  HENT: Atraumatic; oropharynx clear with moist mucous membranes and no mucosal ulcerations; normal hard and soft palate  Neck: Trachea midline; FROM, supple, no thyromegaly or lymphadenopathy  Lungs: CTA, with normal respiratory effort and no intercostal retractions  CV: RRR, no rub very hard to appreciate any rub either  Abdomen: Soft, non-tender; no masses or HSM  Extremities: No peripheral edema or extremity lymphadenopathy  Skin: Normal temperature, turgor and texture; no rash, ulcers or subcutaneous nodules  Psych: Appropriate affect, alert and oriented to person, place and time  Neuro moves all ext, cns 2-12 intact sensation intact       Discharge Disposition  Home or Self Care    Discharge Medications     Discharge Medications      New Medications      Instructions Start Date   amoxicillin-clavulanate 875-125 MG per tablet  Commonly known as: AUGMENTIN   1 tablet, Oral, Every 12 Hours Scheduled    "   furosemide 40 MG tablet  Commonly known as: LASIX   40 mg, Oral, Daily   Start Date: April 7, 2021     lisinopril 2.5 MG tablet  Commonly known as: PRINIVIL,ZESTRIL   2.5 mg, Oral, Daily      potassium chloride 20 MEQ CR tablet  Commonly known as: K-DUR,KLOR-CON   20 mEq, Oral, Daily   Start Date: April 7, 2021        Changes to Medications      Instructions Start Date   carvedilol 3.125 MG tablet  Commonly known as: COREG  What changed:   · medication strength  · how much to take  · when to take this   3.125 mg, Oral, 2 Times Daily With Meals         Continue These Medications      Instructions Start Date   atorvastatin 80 MG tablet  Commonly known as: LIPITOR   1 tablet, Oral, Daily      clopidogrel 75 MG tablet  Commonly known as: PLAVIX   75 mg, Oral, Daily      pregabalin 150 MG capsule  Commonly known as: LYRICA   1 capsule, Oral, 2 Times Daily         Stop These Medications    nabumetone 500 MG tablet  Commonly known as: RELAFEN     naproxen sodium 220 MG tablet  Commonly known as: ALEVE            Discharge Diet: low fat, low cholesterol diet    Activity at Discharge: as tolerated    Follow-up Information     James East MD Follow up in 1 month(s).    Specialty: Cardiology  Contact information:  [address] [facility] [address] [facility] [address]  [place] [address]  [phone]             Lulu Brown APRN Follow up in 1 week(s).    Specialties: Nurse Practitioner, Cardiology  Contact information:  [address] [facility]  [place] [address]  [phone]             Bruno Ricci MD .    Specialty: Internal Medicine  Contact information:  [address] [facility] [address]  [place] [address]  [phone]                   Total time spent discharging patient including evaluation, medication reconciliation, arranging follow up, and post hospitalization instructions and education total time exceeds 30 minutes.    Signed:  Jeffrey Yan MD  4/6/2021  12:45 EDT      Electronically signed by Jeffrey Yan  MD Mati at 04/06/21 2168

## 2021-04-08 ENCOUNTER — READMISSION MANAGEMENT (OUTPATIENT)
Dept: CALL CENTER | Facility: HOSPITAL | Age: 61
End: 2021-04-08

## 2021-04-08 NOTE — OUTREACH NOTE
Medical Week 1 Survey      Responses   [facility] patient discharged from?  [place]   Does the patient have one of the following disease processes/diagnoses(primary or secondary)?  Other   Week 1 attempt successful?  No   Unsuccessful attempts  Attempt 1          Bruno Olivares RN

## 2021-04-09 ENCOUNTER — APPOINTMENT (OUTPATIENT)
Dept: CARDIAC REHAB | Facility: HOSPITAL | Age: 61
End: 2021-04-09

## 2021-04-10 ENCOUNTER — READMISSION MANAGEMENT (OUTPATIENT)
Dept: CALL CENTER | Facility: HOSPITAL | Age: 61
End: 2021-04-10

## 2021-04-10 NOTE — OUTREACH NOTE
Medical Week 1 Survey      Responses   [facility] patient discharged from?  [place]   Does the patient have one of the following disease processes/diagnoses(primary or secondary)?  Other   Week 1 attempt successful?  No   Unsuccessful attempts  Attempt 2          Danita Gil RN

## 2021-04-11 ENCOUNTER — READMISSION MANAGEMENT (OUTPATIENT)
Dept: CALL CENTER | Facility: HOSPITAL | Age: 61
End: 2021-04-11

## 2021-04-11 NOTE — OUTREACH NOTE
Medical Week 1 Survey      Responses   [facility] patient discharged from?  [place]   Does the patient have one of the following disease processes/diagnoses(primary or secondary)?  Other   Week 1 attempt successful?  No   Unsuccessful attempts  Attempt 3          Bijal Niño RN

## 2021-04-12 ENCOUNTER — APPOINTMENT (OUTPATIENT)
Dept: CARDIAC REHAB | Facility: HOSPITAL | Age: 61
End: 2021-04-12

## 2021-04-14 ENCOUNTER — APPOINTMENT (OUTPATIENT)
Dept: CARDIAC REHAB | Facility: HOSPITAL | Age: 61
End: 2021-04-14

## 2021-04-14 ENCOUNTER — OFFICE VISIT (OUTPATIENT)
Dept: CARDIOLOGY | Facility: CLINIC | Age: 61
End: 2021-04-14

## 2021-04-14 VITALS
WEIGHT: 238 LBS | DIASTOLIC BLOOD PRESSURE: 68 MMHG | OXYGEN SATURATION: 96 % | HEART RATE: 68 BPM | RESPIRATION RATE: 18 BRPM | SYSTOLIC BLOOD PRESSURE: 114 MMHG | BODY MASS INDEX: 32.23 KG/M2 | HEIGHT: 72 IN

## 2021-04-14 DIAGNOSIS — J96.01 ACUTE HYPOXEMIC RESPIRATORY FAILURE (HCC): ICD-10-CM

## 2021-04-14 DIAGNOSIS — I25.810 CORONARY ARTERY DISEASE INVOLVING CORONARY BYPASS GRAFT OF NATIVE HEART WITHOUT ANGINA PECTORIS: Primary | ICD-10-CM

## 2021-04-14 DIAGNOSIS — I25.5 ISCHEMIC CARDIOMYOPATHY: ICD-10-CM

## 2021-04-14 PROCEDURE — 99214 OFFICE O/P EST MOD 30 MIN: CPT | Performed by: NURSE PRACTITIONER

## 2021-04-14 PROCEDURE — 93000 ELECTROCARDIOGRAM COMPLETE: CPT | Performed by: NURSE PRACTITIONER

## 2021-04-14 NOTE — PROGRESS NOTES
Date of Office Visit: 2021  Encounter Provider: DAYAMI Chavez  Place of Service: [facility] CARDIOLOGY  Patient Name: Harvinder Vega  :1960    Chief Complaint   Patient presents with   • Coronary Artery Disease     1 WK HOSP FOLLOW UP   • Cardiomyopathy   :     HPI: Harvinder Vega is a 61 y.o. male who presents today for follow-up.  Old records been obtained and reviewed by me.  He is a patient of Dr. Lowery with a past cardiac history significant for coronary artery disease.  In 2020, he experienced a non-STEMI complicated by cardiogenic shock.  Ultimately, he underwent a CABG x 3 with a LIMA to the LAD, individual vein grafts to the diagonal of the LAD, obtuse marginal branch of the circumflex, and posterior descending coronary artery of the RCA.  This was performed in [place].  EF at that time was 30 to 35%.  Follow-up echocardiogram in February of this year revealed normal LV systolic function and moderate to severe MR.   On 2021, he presented to the ED with shortness of breath and was noted to have bilateral pulmonary infiltrates.  Ultimately, he was intubated.  Blood pressure in the ED was 210/132.  His blood pressure actually dropped following intubation and sedation requiring pressors with Levophed.  He underwent IV diuresis with substantial improvement in his chest x-ray and symptoms.  Repeat echocardiogram revealed an EF of 46 to 50% and only mild mitral regurgitation.  On 2021, he was stable for discharge.  He is here today for follow-up.   Overall he has been feeling well.  He still has mild shortness of breath on exertion but reports it has improved significantly.  He denies any PND, orthopnea, or edema.  He denies any chest pain, palpitations, dizziness, syncope, bleeding difficulties or melena.  He got his second Covid vaccine yesterday.  He is eager to get back on the golf course.  He has been eating a lot of healthy choice  meals and is wondering if this is okay.  He has been a little depressed recently.  He lost his wife about a year and a half ago.    Past Medical History:   Diagnosis Date   • Acute respiratory failure (CMS/HCC)     hypoxic   • Arthritis    • Claustrophobia    • Coronary artery disease    • Herniated intervertebral disc of lumbar spine    • Hyperlipidemia    • Low back pain    • RBBB (right bundle branch block)        Past Surgical History:   Procedure Laterality Date   • CORONARY ARTERY BYPASS GRAFT     • GANGLION CYST EXCISION Right     ANKLE   • KNEE SURGERY Right    • LUMBAR EPIDURAL INJECTION     • LUMBAR LAMINECTOMY DISCECTOMY DECOMPRESSION Left 2017    Procedure: Left L4-5, L5-S1 laminectomy;  Surgeon: Galindo Hernandes MD;  Location: [facility];  Service:    • ROTATOR CUFF REPAIR Right    • SHOULDER SURGERY Right    • TONSILECTOMY, ADENOIDECTOMY, BILATERAL MYRINGOTOMY AND TUBES             Social History     Socioeconomic History   • Marital status:      Spouse name: Not on file   • Number of children: Not on file   • Years of education: Not on file   • Highest education level: Not on file   Tobacco Use   • Smoking status: Former Smoker     Packs/day: 1.50     Years: 30.00     Pack years: 45.00     Types: Cigarettes     Quit date: 2020     Years since quittin.4   • Smokeless tobacco: Never Used   • Tobacco comment: CAFFEINE USE: DIET MOUNTAIN DEW/TEA OCC.   Vaping Use   • Vaping Use: Never used   Substance and Sexual Activity   • Alcohol use: Not Currently   • Drug use: Defer   • Sexual activity: Yes     Partners: Female       Family History   Problem Relation Age of Onset   • Heart failure Mother    • Heart failure Father    • Diabetes Maternal Grandfather    • Malig Hyperthermia Neg Hx        Review of Systems   Constitutional: Negative.   Cardiovascular: Positive for dyspnea on exertion. Negative for chest pain, leg swelling, orthopnea, paroxysmal nocturnal dyspnea and  "syncope.   Respiratory: Negative.    Hematologic/Lymphatic: Negative for bleeding problem.   Musculoskeletal: Negative for falls.   Gastrointestinal: Negative for melena.   Neurological: Negative for dizziness and light-headedness.       No Known Allergies      Current Outpatient Medications:   •  atorvastatin (LIPITOR) 80 MG tablet, Take 1 tablet by mouth Daily., Disp: , Rfl:   •  carvedilol (COREG) 3.125 MG tablet, Take 1 tablet by mouth 2 (Two) Times a Day With Meals., Disp: 60 tablet, Rfl: 11  •  clopidogrel (PLAVIX) 75 MG tablet, Take 75 mg by mouth Daily., Disp: , Rfl:   •  furosemide (LASIX) 40 MG tablet, Take 1 tablet by mouth Daily., Disp: 30 tablet, Rfl: 11  •  lisinopril (PRINIVIL,ZESTRIL) 2.5 MG tablet, Take 1 tablet by mouth Daily., Disp: 30 tablet, Rfl: 11  •  potassium chloride (K-DUR,KLOR-CON) 20 MEQ CR tablet, Take 1 tablet by mouth Daily., Disp: 30 tablet, Rfl: 11  •  pregabalin (LYRICA) 150 MG capsule, Take 1 capsule by mouth 2 (Two) Times a Day., Disp: , Rfl:       Objective:     Vitals:    04/14/21 1431 04/14/21 1442   BP: 118/68 114/68   BP Location: Right arm Left arm   Patient Position: Sitting Sitting   Pulse: 69 68   Resp: 18    SpO2: 96%    Weight: 108 kg (238 lb)    Height: 182.9 cm (72\")      Body mass index is 32.28 kg/m².    PHYSICAL EXAM:    Neck:      Vascular: No JVD.   Pulmonary:      Effort: Pulmonary effort is normal.      Breath sounds: Normal breath sounds.   Cardiovascular:      Normal rate. Regular rhythm.      Murmurs: There is no murmur.      No gallop. No click. No rub.   Pulses:     Intact distal pulses.           ECG 12 Lead    Date/Time: 4/14/2021 2:51 PM  Performed by: Lulu Brown APRN  Authorized by: Lulu Brown APRN   Comparison: compared with previous ECG from 4/3/2021  Similar to previous ECG  Rhythm: sinus rhythm  Rate: normal  BPM: 68  Conduction: right bundle branch block  Q waves: III      Clinical impression: abnormal EKG  Comments: " Indication: CAD              Assessment:       Diagnosis Plan   1. Coronary artery disease involving coronary bypass graft of native heart without angina pectoris  ECG 12 Lead   2. Ischemic cardiomyopathy     3. Acute hypoxemic respiratory failure (CMS/[facility])       Orders Placed This Encounter   Procedures   • ECG 12 Lead     This order was created via procedure documentation     Order Specific Question:   Release to patient     Answer:   Immediate          Plan:       1.  Coronary artery disease.  Status post CABG x 3 in November 2020.  He is on Plavix and a high intensity statin.      2.  Ischemic cardiomyopathy.  Recent EF 46 to 50%.  He is on guideline directed medical therapy with carvedilol and lisinopril.  He is also on Lasix.  He appears euvolemic today.      Overall I think he stable and doing well.  He denies any symptoms of angina or heart failure.  I encouraged him to follow-up with his PCP regarding his depression.  I also advised him to be careful with the frozen meals as they are very high in sodium.  Otherwise, I am not going to make any changes.  He will follow-up with Dr. East 5/5/2021.      As always, it has been a pleasure to participate in your patient's care.      Sincerely,         DAYAMI Schwarz

## 2021-04-16 ENCOUNTER — APPOINTMENT (OUTPATIENT)
Dept: CARDIAC REHAB | Facility: HOSPITAL | Age: 61
End: 2021-04-16

## 2021-04-18 ENCOUNTER — READMISSION MANAGEMENT (OUTPATIENT)
Dept: CALL CENTER | Facility: HOSPITAL | Age: 61
End: 2021-04-18

## 2021-04-18 NOTE — OUTREACH NOTE
Medical Week 2 Survey      Responses   [facility] patient discharged from?  [place]   Does the patient have one of the following disease processes/diagnoses(primary or secondary)?  Other   Week 2 attempt successful?  No   Unsuccessful attempts  Attempt 1          Lexus Schmitz RN

## 2021-04-19 ENCOUNTER — APPOINTMENT (OUTPATIENT)
Dept: CARDIAC REHAB | Facility: HOSPITAL | Age: 61
End: 2021-04-19

## 2021-04-20 ENCOUNTER — READMISSION MANAGEMENT (OUTPATIENT)
Dept: CALL CENTER | Facility: HOSPITAL | Age: 61
End: 2021-04-20

## 2021-04-20 NOTE — OUTREACH NOTE
Medical Week 2 Survey      Responses   [facility] patient discharged from?  [place]   Does the patient have one of the following disease processes/diagnoses(primary or secondary)?  Other   Week 2 attempt successful?  No   Unsuccessful attempts  Attempt 2          Tarsha Jaffe RN

## 2021-04-21 ENCOUNTER — APPOINTMENT (OUTPATIENT)
Dept: CARDIAC REHAB | Facility: HOSPITAL | Age: 61
End: 2021-04-21

## 2021-04-29 ENCOUNTER — READMISSION MANAGEMENT (OUTPATIENT)
Dept: CALL CENTER | Facility: HOSPITAL | Age: 61
End: 2021-04-29

## 2021-04-29 NOTE — OUTREACH NOTE
"Medical Week 3 Survey      Responses   [facility] patient discharged from?  [employer]   Does the patient have one of the following disease processes/diagnoses(primary or secondary)?  Other   Week 3 attempt successful?  Yes   Call start time  1047   Call end time  1049   Is patient permission given to speak with other caregiver?  Yes   List who call center can speak with  [place]    Person spoke with today (if not patient) and relationship  Edwin [place]   Meds reviewed with patient/caregiver?  Yes   Is the patient taking all medications as directed (includes completed medication regime)?  Yes   Has the patient kept scheduled appointments due by today?  Yes [Edwin states pt has been to the PCP already and NP cardiologist \"he believes\" ]   What is the patient's perception of their health status since discharge?  Improving   Week 3 Call Completed?  Yes   Wrap up additional comments  Edwin, pt's son, states he spoke to his dad last night on the phone and that he was \"doing ok\"          Chelsey Oliva RN  "

## 2021-05-05 ENCOUNTER — OFFICE VISIT (OUTPATIENT)
Dept: CARDIOLOGY | Facility: CLINIC | Age: 61
End: 2021-05-05

## 2021-05-05 ENCOUNTER — LAB (OUTPATIENT)
Dept: LAB | Facility: HOSPITAL | Age: 61
End: 2021-05-05

## 2021-05-05 VITALS
DIASTOLIC BLOOD PRESSURE: 80 MMHG | BODY MASS INDEX: 32.64 KG/M2 | HEART RATE: 73 BPM | SYSTOLIC BLOOD PRESSURE: 140 MMHG | HEIGHT: 72 IN | WEIGHT: 241 LBS | OXYGEN SATURATION: 99 %

## 2021-05-05 DIAGNOSIS — I25.5 ISCHEMIC CARDIOMYOPATHY: ICD-10-CM

## 2021-05-05 DIAGNOSIS — I34.0 NONRHEUMATIC MITRAL VALVE REGURGITATION: ICD-10-CM

## 2021-05-05 DIAGNOSIS — I45.10 RIGHT BUNDLE BRANCH BLOCK: ICD-10-CM

## 2021-05-05 DIAGNOSIS — E78.2 MIXED HYPERLIPIDEMIA: ICD-10-CM

## 2021-05-05 DIAGNOSIS — I25.810 CORONARY ARTERY DISEASE INVOLVING CORONARY BYPASS GRAFT OF NATIVE HEART WITHOUT ANGINA PECTORIS: Primary | ICD-10-CM

## 2021-05-05 DIAGNOSIS — E66.3 OVER WEIGHT: ICD-10-CM

## 2021-05-05 DIAGNOSIS — J96.01 ACUTE HYPOXEMIC RESPIRATORY FAILURE (HCC): ICD-10-CM

## 2021-05-05 DIAGNOSIS — I10 ESSENTIAL HYPERTENSION: ICD-10-CM

## 2021-05-05 LAB
ANION GAP SERPL CALCULATED.3IONS-SCNC: 9.1 MMOL/L (ref 5–15)
BUN SERPL-MCNC: 21 MG/DL (ref 8–23)
BUN/CREAT SERPL: 21.2 (ref 7–25)
CALCIUM SPEC-SCNC: 10 MG/DL (ref 8.6–10.5)
CHLORIDE SERPL-SCNC: 102 MMOL/L (ref 98–107)
CO2 SERPL-SCNC: 27.9 MMOL/L (ref 22–29)
CREAT SERPL-MCNC: 0.99 MG/DL (ref 0.76–1.27)
GFR SERPL CREATININE-BSD FRML MDRD: 76 ML/MIN/1.73
GLUCOSE SERPL-MCNC: 110 MG/DL (ref 65–99)
POTASSIUM SERPL-SCNC: 6 MMOL/L (ref 3.5–5.2)
SODIUM SERPL-SCNC: 139 MMOL/L (ref 136–145)

## 2021-05-05 PROCEDURE — 80048 BASIC METABOLIC PNL TOTAL CA: CPT | Performed by: INTERNAL MEDICINE

## 2021-05-05 PROCEDURE — 99214 OFFICE O/P EST MOD 30 MIN: CPT | Performed by: INTERNAL MEDICINE

## 2021-05-05 PROCEDURE — 36415 COLL VENOUS BLD VENIPUNCTURE: CPT | Performed by: INTERNAL MEDICINE

## 2021-05-05 PROCEDURE — 93000 ELECTROCARDIOGRAM COMPLETE: CPT | Performed by: INTERNAL MEDICINE

## 2021-05-05 RX ORDER — ESCITALOPRAM OXALATE 10 MG/1
10 TABLET ORAL DAILY
COMMUNITY
Start: 2021-04-15 | End: 2022-04-15

## 2021-05-05 NOTE — PROGRESS NOTES
Date of Office Visit: 2021    Patient Name: Harvinder Vega  : 1960    Encounter Provider: James East MD  Referring Provider: James East MD  Place of Service: [facility] CARDIOLOGY  Patient Care Team:  Bruno Ricci MD as PCP - General (Internal Medicine)      Chief Complaint   Patient presents with   • Coronary Artery Disease   • Cardiomyopathy     History of Present Illness    The patient is a 61-year-old white male with a history of coronary disease who was recently discharged from the hospital after being treated for acute respiratory failure that was in part driven by hypertensive emergency and a history of ischemic cardiomyopathy.    During the hospitalization the patient did have an echocardiogram.  His ejection fraction was approximately 45%.  Mitral regurgitation was classified as mild.  The patient was started on diuretic therapy and since discharge has felt actually fairly well.  He does not complain of shortness of breath or any angina.    In 2020 the patient experienced a non-ST FIDELINA.  His infarct was complicated by cardiogenic shock.  He ultimately underwent bypass surgery with an internal mammary graft placed to the LAD and individual saphenous vein graft to the diagonal branch of the LAD obtuse marginal branch of the circumflex as well as posterior descending branch of the right coronary artery.  The surgery was performed in [facility].    The patient remains overweight.  He is not doing much with respect to his diet.  Despite that his most recent lipid panel was reviewed and excellent.  Total cholesterol was 110 with HDL of 39 and LDL of 55.  Triglycerides were 77.    Past Medical History:   Diagnosis Date   • Acute respiratory failure (CMS/HCC)     hypoxic   • Arthritis    • Claustrophobia    • Coronary artery disease    • Herniated intervertebral disc of lumbar spine    • Hyperlipidemia    • Low back pain      • RBBB (right bundle branch block)          Past Surgical History:   Procedure Laterality Date   • CORONARY ARTERY BYPASS GRAFT     • GANGLION CYST EXCISION Right     ANKLE   • KNEE SURGERY Right 2013   • LUMBAR EPIDURAL INJECTION     • LUMBAR LAMINECTOMY DISCECTOMY DECOMPRESSION Left 2017    Procedure: Left L4-5, L5-S1 laminectomy;  Surgeon: Galindo Hernandes MD;  Location: [facility];  Service:    • ROTATOR CUFF REPAIR Right    • SHOULDER SURGERY Right    • TONSILECTOMY, ADENOIDECTOMY, BILATERAL MYRINGOTOMY AND TUBES                 Current Outpatient Medications:   •  atorvastatin (LIPITOR) 80 MG tablet, Take 1 tablet by mouth Daily., Disp: , Rfl:   •  carvedilol (COREG) 3.125 MG tablet, Take 1 tablet by mouth 2 (Two) Times a Day With Meals., Disp: 60 tablet, Rfl: 11  •  clopidogrel (PLAVIX) 75 MG tablet, Take 75 mg by mouth Daily., Disp: , Rfl:   •  escitalopram (LEXAPRO) 10 MG tablet, Take 10 mg by mouth Daily., Disp: , Rfl:   •  furosemide (LASIX) 40 MG tablet, Take 1 tablet by mouth Daily., Disp: 30 tablet, Rfl: 11  •  lisinopril (PRINIVIL,ZESTRIL) 2.5 MG tablet, Take 1 tablet by mouth Daily., Disp: 30 tablet, Rfl: 11  •  potassium chloride (K-DUR,KLOR-CON) 20 MEQ CR tablet, Take 1 tablet by mouth Daily., Disp: 30 tablet, Rfl: 11  •  pregabalin (LYRICA) 150 MG capsule, Take 1 capsule by mouth 2 (Two) Times a Day., Disp: , Rfl:       Social History     Socioeconomic History   • Marital status:      Spouse name: Not on file   • Number of children: Not on file   • Years of education: Not on file   • Highest education level: Not on file   Tobacco Use   • Smoking status: Former Smoker     Packs/day: 1.50     Years: 30.00     Pack years: 45.00     Types: Cigarettes     Quit date: 2020     Years since quittin.4   • Smokeless tobacco: Never Used   • Tobacco comment: CAFFEINE USE: DIET MOUNTAIN DEW/TEA OCC.   Vaping Use   • Vaping Use: Never used   Substance and Sexual Activity   •  "Alcohol use: Not Currently   • Drug use: Defer   • Sexual activity: Yes     Partners: Female         Review of Systems   Constitutional: Negative.   HENT: Negative.    Eyes: Negative.    Cardiovascular: Negative.    Respiratory: Negative.    Endocrine: Negative.    Skin: Negative.    Musculoskeletal: Negative.    Gastrointestinal: Negative.    Neurological: Negative.    Psychiatric/Behavioral: Negative.        Procedures      ECG 12 Lead    Date/Time: 5/5/2021 10:09 AM  Performed by: James East MD  Authorized by: James East MD   Comparison: compared with previous ECG from 4/14/2021  Similar to previous ECG  Rhythm: sinus rhythm  Rate: normal  Conduction: right bundle branch block  Q waves: II, III and aVF    QRS axis: normal                  Objective:    /80 (BP Location: Left arm, Patient Position: Sitting, Cuff Size: Adult)   Pulse 73   Ht 182.9 cm (72\")   Wt 109 kg (241 lb)   SpO2 99%   BMI 32.69 kg/m²         Constitutional:       Appearance: Normal and healthy appearance. Well-developed and overweight.   Neck:      Vascular: No JVR.   Pulmonary:      Effort: Pulmonary effort is normal.      Breath sounds: Normal breath sounds.   Cardiovascular:      Normal rate. Regular rhythm. Normal S1. Normal S2.      Murmurs: There is no murmur.      No gallop. No rub.   Pulses:     Intact distal pulses.   Edema:     Peripheral edema absent.             Assessment:       Diagnosis Plan   1. Coronary artery disease involving coronary bypass graft of native heart without angina pectoris  Basic Metabolic Panel   2. Nonrheumatic mitral valve regurgitation     3. Essential hypertension     4. Over weight     5. Mixed hyperlipidemia     6. Acute hypoxemic respiratory failure (CMS/HCC)     7. Right bundle branch block     8. Ischemic cardiomyopathy       1.  Coronary artery disease: Status post NSTEMI.  Status post CABG November 2020.  No angina pectoris  2.  Ischemic cardiomyopathy: LVEF 45%..  On " guideline directed medical therapy with beta-blocker, ACE inhibitor, diuretic  3.  Hypertension: Controlled presently on medical therapy  4.  Overweight: Discussed dietary measures in detail and physical activity  5.  Hyperlipidemia: On statin therapy  6.  Acute respiratory failure: Resolved.  Secondary to ischemic cardiomyopathy and uncontrolled hypertension.  7.  Chronic right bundle branch block next number mitral regurgitation: Mild by most recent echocardiogram       Plan:       1.  Continue current medical therapy  2.  BMP.  Last potassium checked earlier in April was 3.1.

## 2021-05-06 DIAGNOSIS — I25.810 CORONARY ARTERY DISEASE INVOLVING CORONARY BYPASS GRAFT OF NATIVE HEART WITHOUT ANGINA PECTORIS: Primary | ICD-10-CM

## 2021-05-07 ENCOUNTER — READMISSION MANAGEMENT (OUTPATIENT)
Dept: CALL CENTER | Facility: HOSPITAL | Age: 61
End: 2021-05-07

## 2021-05-07 NOTE — OUTREACH NOTE
Medical Week 4 Survey      Responses   [facility] patient discharged from?  [place]   Does the patient have one of the following disease processes/diagnoses(primary or secondary)?  Other   Week 4 attempt successful?  No          Trang Trivedi RN

## 2021-05-13 ENCOUNTER — LAB (OUTPATIENT)
Dept: LAB | Facility: HOSPITAL | Age: 61
End: 2021-05-13

## 2021-05-13 DIAGNOSIS — I25.810 CORONARY ARTERY DISEASE INVOLVING CORONARY BYPASS GRAFT OF NATIVE HEART WITHOUT ANGINA PECTORIS: ICD-10-CM

## 2021-05-13 LAB
ANION GAP SERPL CALCULATED.3IONS-SCNC: 7.8 MMOL/L (ref 5–15)
BUN SERPL-MCNC: 21 MG/DL (ref 8–23)
BUN/CREAT SERPL: 22.3 (ref 7–25)
CALCIUM SPEC-SCNC: 9 MG/DL (ref 8.6–10.5)
CHLORIDE SERPL-SCNC: 104 MMOL/L (ref 98–107)
CO2 SERPL-SCNC: 30.2 MMOL/L (ref 22–29)
CREAT SERPL-MCNC: 0.94 MG/DL (ref 0.76–1.27)
GFR SERPL CREATININE-BSD FRML MDRD: 81 ML/MIN/1.73
GLUCOSE SERPL-MCNC: 110 MG/DL (ref 65–99)
POTASSIUM SERPL-SCNC: 5.3 MMOL/L (ref 3.5–5.2)
SODIUM SERPL-SCNC: 142 MMOL/L (ref 136–145)

## 2021-05-13 PROCEDURE — 80048 BASIC METABOLIC PNL TOTAL CA: CPT

## 2021-05-13 PROCEDURE — 36415 COLL VENOUS BLD VENIPUNCTURE: CPT

## 2021-12-20 ENCOUNTER — OFFICE VISIT (OUTPATIENT)
Dept: CARDIOLOGY | Facility: CLINIC | Age: 61
End: 2021-12-20

## 2021-12-20 VITALS
BODY MASS INDEX: 37.41 KG/M2 | OXYGEN SATURATION: 97 % | SYSTOLIC BLOOD PRESSURE: 140 MMHG | HEIGHT: 72 IN | WEIGHT: 276.2 LBS | HEART RATE: 62 BPM | DIASTOLIC BLOOD PRESSURE: 78 MMHG

## 2021-12-20 DIAGNOSIS — E66.3 OVER WEIGHT: ICD-10-CM

## 2021-12-20 DIAGNOSIS — I45.10 RIGHT BUNDLE BRANCH BLOCK: ICD-10-CM

## 2021-12-20 DIAGNOSIS — E78.2 MIXED HYPERLIPIDEMIA: ICD-10-CM

## 2021-12-20 DIAGNOSIS — I25.810 CORONARY ARTERY DISEASE INVOLVING CORONARY BYPASS GRAFT OF NATIVE HEART WITHOUT ANGINA PECTORIS: Primary | ICD-10-CM

## 2021-12-20 PROCEDURE — 93000 ELECTROCARDIOGRAM COMPLETE: CPT | Performed by: INTERNAL MEDICINE

## 2021-12-20 PROCEDURE — 99214 OFFICE O/P EST MOD 30 MIN: CPT | Performed by: INTERNAL MEDICINE

## 2021-12-20 RX ORDER — POTASSIUM CHLORIDE 20 MEQ/1
20 TABLET, EXTENDED RELEASE ORAL DAILY
COMMUNITY
Start: 2021-11-07 | End: 2022-04-07

## 2021-12-20 NOTE — PROGRESS NOTES
OFFICE VISIT      Date of Office Visit: 2021    Patient Name: Harvinder Vega  : 1960    Encounter Provider: James East MD  Referring Provider: No ref. provider found  Primary Care Provider: Bruno Ricci MD  Place of Service: [facility] CARDIOLOGY        Chief Complaint   Patient presents with   • Coronary Artery Disease   • Follow-up     History of Present Illness    The patient is a 61-year-old white male with coronary artery disease, hyperlipidemia who returns to the office today for follow-up.    The patient experienced a non-ST elevation myocardial infarction in 2020.  His infarct was complicated by cardiogenic shock.  Ultimately he underwent bypass surgery with an internal mammary graft to the LAD and individual saphenous vein graft to the diagonal branch of the LAD, obtuse marginal branch of the circumflex and posterior descending branch of the right coronary artery.  His surgery was performed in [facility].    He returns to the office today feeling well.  He was hospitalized in May 2021 with respiratory failure.  He is actually been doing fairly well.  Unfortunately he has gained over 25 pounds since his last visit.    Past Medical History:   Diagnosis Date   • Acute respiratory failure (HCC)     hypoxic   • Arthritis    • Claustrophobia    • Coronary artery disease    • Herniated intervertebral disc of lumbar spine    • Hyperlipidemia    • Low back pain    • RBBB (right bundle branch block)          Past Surgical History:   Procedure Laterality Date   • CORONARY ARTERY BYPASS GRAFT     • GANGLION CYST EXCISION Right     ANKLE   • KNEE SURGERY Right    • LUMBAR EPIDURAL INJECTION     • LUMBAR LAMINECTOMY DISCECTOMY DECOMPRESSION Left 2017    Procedure: Left L4-5, L5-S1 laminectomy;  Surgeon: Galindo Hernandes MD;  Location: [facility];  Service:    • ROTATOR CUFF REPAIR Right    • SHOULDER SURGERY Right    •  TONSILECTOMY, ADENOIDECTOMY, BILATERAL MYRINGOTOMY AND TUBES                 Current Outpatient Medications:   •  atorvastatin (LIPITOR) 80 MG tablet, Take 1 tablet by mouth Daily., Disp: , Rfl:   •  carvedilol (COREG) 3.125 MG tablet, Take 1 tablet by mouth 2 (Two) Times a Day With Meals., Disp: 60 tablet, Rfl: 11  •  clopidogrel (PLAVIX) 75 MG tablet, Take 75 mg by mouth Daily., Disp: , Rfl:   •  escitalopram (LEXAPRO) 10 MG tablet, Take 10 mg by mouth Daily., Disp: , Rfl:   •  furosemide (LASIX) 40 MG tablet, Take 1 tablet by mouth Daily., Disp: 30 tablet, Rfl: 11  •  lisinopril (PRINIVIL,ZESTRIL) 2.5 MG tablet, Take 1 tablet by mouth Daily., Disp: 30 tablet, Rfl: 11  •  potassium chloride (K-DUR,KLOR-CON) 20 MEQ CR tablet, Take 20 mEq by mouth Daily., Disp: , Rfl:   •  pregabalin (LYRICA) 150 MG capsule, Take 1 capsule by mouth 2 (Two) Times a Day., Disp: , Rfl:       Social History     Socioeconomic History   • Marital status:    Tobacco Use   • Smoking status: Former Smoker     Packs/day: 1.50     Years: 30.00     Pack years: 45.00     Types: Cigarettes     Quit date: 2020     Years since quittin.0   • Smokeless tobacco: Never Used   • Tobacco comment: CAFFEINE USE: DIET MOUNTAIN DEW/TEA OCC.   Vaping Use   • Vaping Use: Never used   Substance and Sexual Activity   • Alcohol use: Not Currently   • Drug use: Defer   • Sexual activity: Yes     Partners: Female         Review of Systems   Constitutional: Positive for weight gain.   HENT: Negative.    Eyes: Negative.    Cardiovascular: Positive for dyspnea on exertion.   Respiratory: Negative.    Endocrine: Negative.    Skin: Negative.    Musculoskeletal: Negative.    Gastrointestinal: Negative.    Neurological: Negative.    Psychiatric/Behavioral: Negative.        Procedures      ECG 12 Lead    Date/Time: 2021 2:19 PM  Performed by: James East MD  Authorized by: James East MD   Comparison: compared with previous ECG  "from 5/5/2021  Rhythm: sinus rhythm  Rate: normal  Conduction: right bundle branch block  QRS axis: normal                  Objective:    /78 (BP Location: Left arm, Patient Position: Sitting)   Pulse 62   Ht 182.9 cm (72\")   Wt 125 kg (276 lb 3.2 oz)   SpO2 97%   BMI 37.46 kg/m²         Vitals reviewed.   Constitutional:       Appearance: Healthy appearance. Well-developed and not in distress. Obese.   Eyes:      Pupils: Pupils are equal, round, and reactive to light.   HENT:      Head: Normocephalic.   Neck:      Thyroid: No thyromegaly.      Vascular: No carotid bruit or JVD.   Pulmonary:      Effort: Pulmonary effort is normal.      Breath sounds: Normal breath sounds.   Cardiovascular:      Normal rate. Regular rhythm. Normal S1. Normal S2.      Murmurs: There is no murmur.      No gallop. No click. No rub.   Pulses:     Intact distal pulses.   Edema:     Peripheral edema absent.   Abdominal:      General: Bowel sounds are normal.      Palpations: Abdomen is soft.   Musculoskeletal:      Cervical back: Normal range of motion. Skin:     General: Skin is warm and dry.      Findings: No erythema.   Neurological:      Mental Status: Alert and oriented to person, place, and time.             Assessment:       Diagnosis Plan   1. Coronary artery disease involving coronary bypass graft of native heart without angina pectoris     2. Mixed hyperlipidemia  Lipid Panel    Hepatic Function Panel   3. Right bundle branch block     4. Over weight         1.  Coronary artery disease: Status post non-ST FIDELINA.  Status post CABG 2020.  No angina pectoris  2.  Hyperlipidemia: On statin therapy.  Last check of his lipids was 8 months ago with good results with the exception of borderline low HDL.  Will place order for repeat  3.  Right bundle branch block: Chronic  4.  Obesity: The patient is approaching morbid obesity.  Discussed in detail     Plan:         "

## 2022-02-24 RX ORDER — CARVEDILOL 3.12 MG/1
TABLET ORAL
Qty: 180 TABLET | Refills: 2 | Status: SHIPPED | OUTPATIENT
Start: 2022-02-24 | End: 2022-08-01 | Stop reason: SDUPTHER

## 2022-03-28 RX ORDER — FUROSEMIDE 40 MG/1
40 TABLET ORAL DAILY
Qty: 90 TABLET | Refills: 1 | Status: SHIPPED | OUTPATIENT
Start: 2022-03-28 | End: 2022-08-01 | Stop reason: SDUPTHER

## 2022-03-31 RX ORDER — LISINOPRIL 2.5 MG/1
2.5 TABLET ORAL DAILY
Qty: 90 TABLET | Refills: 1 | Status: SHIPPED | OUTPATIENT
Start: 2022-03-31 | End: 2022-08-01 | Stop reason: SDUPTHER

## 2022-03-31 NOTE — TELEPHONE ENCOUNTER
Please advise filling Lisinopril.    LOV   -   12/20/21  Next   -   6/30/2022  Last Labs   -   5/13/21 BMP

## 2022-04-07 RX ORDER — POTASSIUM CHLORIDE 20 MEQ/1
TABLET, EXTENDED RELEASE ORAL
Qty: 30 TABLET | Refills: 2 | Status: SHIPPED | OUTPATIENT
Start: 2022-04-07 | End: 2022-07-06

## 2022-06-07 ENCOUNTER — TELEPHONE (OUTPATIENT)
Dept: CARDIOLOGY | Facility: CLINIC | Age: 62
End: 2022-06-07

## 2022-06-07 NOTE — TELEPHONE ENCOUNTER
Mr. 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    I will place the letter in your inbox up on the 6th floor.  You can just type something up and I will fax to Dr. Markham at [phone].    Melani

## 2022-06-28 PROBLEM — I10 ESSENTIAL HYPERTENSION: Status: ACTIVE | Noted: 2022-06-28

## 2022-07-06 RX ORDER — POTASSIUM CHLORIDE 20 MEQ/1
TABLET, EXTENDED RELEASE ORAL
Qty: 30 TABLET | Refills: 2 | Status: SHIPPED | OUTPATIENT
Start: 2022-07-06 | End: 2022-08-01 | Stop reason: SDUPTHER

## 2022-07-31 PROBLEM — I25.10 CORONARY ARTERY DISEASE INVOLVING NATIVE CORONARY ARTERY OF NATIVE HEART WITHOUT ANGINA PECTORIS: Status: ACTIVE | Noted: 2022-07-31

## 2022-07-31 PROBLEM — I25.810 CORONARY ARTERY DISEASE INVOLVING CORONARY BYPASS GRAFT OF NATIVE HEART WITHOUT ANGINA PECTORIS: Status: RESOLVED | Noted: 2021-01-27 | Resolved: 2022-07-31

## 2022-07-31 PROBLEM — Z95.1 S/P CABG (CORONARY ARTERY BYPASS GRAFT): Status: ACTIVE | Noted: 2022-07-31

## 2022-08-01 ENCOUNTER — OFFICE VISIT (OUTPATIENT)
Dept: CARDIOLOGY | Facility: CLINIC | Age: 62
End: 2022-08-01

## 2022-08-01 VITALS
WEIGHT: 280.8 LBS | OXYGEN SATURATION: 97 % | BODY MASS INDEX: 38.03 KG/M2 | HEART RATE: 66 BPM | DIASTOLIC BLOOD PRESSURE: 90 MMHG | HEIGHT: 72 IN | SYSTOLIC BLOOD PRESSURE: 144 MMHG

## 2022-08-01 DIAGNOSIS — Z95.1 S/P CABG (CORONARY ARTERY BYPASS GRAFT): ICD-10-CM

## 2022-08-01 DIAGNOSIS — I45.10 RIGHT BUNDLE BRANCH BLOCK: ICD-10-CM

## 2022-08-01 DIAGNOSIS — E78.2 MIXED HYPERLIPIDEMIA: Primary | ICD-10-CM

## 2022-08-01 DIAGNOSIS — E66.3 OVER WEIGHT: ICD-10-CM

## 2022-08-01 DIAGNOSIS — I10 ESSENTIAL HYPERTENSION: ICD-10-CM

## 2022-08-01 DIAGNOSIS — I25.10 CORONARY ARTERY DISEASE INVOLVING NATIVE CORONARY ARTERY OF NATIVE HEART WITHOUT ANGINA PECTORIS: ICD-10-CM

## 2022-08-01 PROCEDURE — 99214 OFFICE O/P EST MOD 30 MIN: CPT | Performed by: NURSE PRACTITIONER

## 2022-08-01 PROCEDURE — 93000 ELECTROCARDIOGRAM COMPLETE: CPT | Performed by: NURSE PRACTITIONER

## 2022-08-01 RX ORDER — LISINOPRIL 20 MG/1
20 TABLET ORAL DAILY
Qty: 90 TABLET | Refills: 3 | Status: SHIPPED | OUTPATIENT
Start: 2022-08-01

## 2022-08-01 RX ORDER — POTASSIUM CHLORIDE 20 MEQ/1
20 TABLET, EXTENDED RELEASE ORAL DAILY
Qty: 90 TABLET | Refills: 3 | Status: SHIPPED | OUTPATIENT
Start: 2022-08-01

## 2022-08-01 RX ORDER — ATORVASTATIN CALCIUM 80 MG/1
80 TABLET, FILM COATED ORAL DAILY
Qty: 90 TABLET | Refills: 3 | Status: SHIPPED | OUTPATIENT
Start: 2022-08-01

## 2022-08-01 RX ORDER — FUROSEMIDE 40 MG/1
40 TABLET ORAL DAILY
Qty: 90 TABLET | Refills: 3 | Status: SHIPPED | OUTPATIENT
Start: 2022-08-01

## 2022-08-01 RX ORDER — CARVEDILOL 3.12 MG/1
3.12 TABLET ORAL 2 TIMES DAILY WITH MEALS
Qty: 180 TABLET | Refills: 3 | Status: SHIPPED | OUTPATIENT
Start: 2022-08-01

## 2022-08-01 RX ORDER — CLOPIDOGREL BISULFATE 75 MG/1
75 TABLET ORAL DAILY
Qty: 90 TABLET | Refills: 3 | Status: SHIPPED | OUTPATIENT
Start: 2022-08-01

## 2023-02-08 ENCOUNTER — OFFICE VISIT (OUTPATIENT)
Dept: CARDIOLOGY | Facility: CLINIC | Age: 63
End: 2023-02-08
Payer: COMMERCIAL

## 2023-02-08 VITALS
HEIGHT: 60 IN | BODY MASS INDEX: 48.65 KG/M2 | DIASTOLIC BLOOD PRESSURE: 80 MMHG | WEIGHT: 247.8 LBS | OXYGEN SATURATION: 99 % | SYSTOLIC BLOOD PRESSURE: 130 MMHG | HEART RATE: 62 BPM

## 2023-02-08 DIAGNOSIS — I25.810 CORONARY ARTERY DISEASE INVOLVING CORONARY BYPASS GRAFT OF NATIVE HEART WITHOUT ANGINA PECTORIS: Primary | ICD-10-CM

## 2023-02-08 DIAGNOSIS — E78.2 MIXED HYPERLIPIDEMIA: ICD-10-CM

## 2023-02-08 DIAGNOSIS — I45.10 RIGHT BUNDLE BRANCH BLOCK: ICD-10-CM

## 2023-02-08 DIAGNOSIS — Z95.1 S/P CABG (CORONARY ARTERY BYPASS GRAFT): ICD-10-CM

## 2023-02-08 DIAGNOSIS — I10 ESSENTIAL HYPERTENSION: ICD-10-CM

## 2023-02-08 PROCEDURE — 93000 ELECTROCARDIOGRAM COMPLETE: CPT | Performed by: INTERNAL MEDICINE

## 2023-02-08 PROCEDURE — 99214 OFFICE O/P EST MOD 30 MIN: CPT | Performed by: INTERNAL MEDICINE

## 2023-02-08 NOTE — PROGRESS NOTES
Date of Office Visit: 23    Encounter Provider: Tate Viveros MD  Place of Service: [facility] CARDIOLOGY  Patient Name: Harvinder Vega  :1960    Chief Complaint   Patient presents with   • Shortness of Breath   • Follow-up   CAD  Essential hypertension    HPI: Harvinder Vega is a 62 y.o. with a medical history of coronary artery disease, hyperlipidemia, hypertension, chronic right bundle branch block, PVD and obesity.  He presented with a non-ST elevation MI in 2020.  His infarct was complicated by cardiogenic shock.  He ultimately underwent bypass surgery with LIMA to LAD and SVG to diagonal branch of the LAD, obtuse marginal branch of circumflex and posterior descending branch of the right coronary artery.  His surgery was performed in [place], [place].      He has actually done very well since that time.  His follow-up transthoracic echocardiogram showed a mildly depressed left ventricular systolic function at 45 to 50%.  He also had mild mitral and tricuspid valve regurgitation.  He has been tolerating his current medical regimen which includes Plavix monotherapy.  His cholesterol panel has been well controlled on atorvastatin 80 mg p.o. nightly.  Blood pressure is 130/80 today    Previous testing and notes have been reviewed by me.   Past Medical History:   Diagnosis Date   • Acute respiratory failure (HCC)     hypoxic   • Arthritis    • Claustrophobia    • Coronary artery disease    • Herniated intervertebral disc of lumbar spine    • Hyperlipidemia    • Low back pain    • RBBB (right bundle branch block)        Past Surgical History:   Procedure Laterality Date   • CORONARY ARTERY BYPASS GRAFT     • GANGLION CYST EXCISION Right     ANKLE   • KNEE SURGERY Right    • LUMBAR EPIDURAL INJECTION     • LUMBAR LAMINECTOMY DISCECTOMY DECOMPRESSION Left 2017    Procedure: Left L4-5, L5-S1 laminectomy;  Surgeon: Galindo Hernandes MD;   Location: [facility] OR;  Service:    • ROTATOR CUFF REPAIR Right    • SHOULDER SURGERY Right 2013   • TONSILECTOMY, ADENOIDECTOMY, BILATERAL MYRINGOTOMY AND TUBES             Social History     Socioeconomic History   • Marital status:    Tobacco Use   • Smoking status: Former     Packs/day: 1.50     Years: 30.00     Pack years: 45.00     Types: Cigarettes     Quit date: 2020     Years since quittin.2   • Smokeless tobacco: Never   • Tobacco comments:     CAFFEINE USE: DIET MOUNTAIN DEW/TEA OCC.   Vaping Use   • Vaping Use: Never used   Substance and Sexual Activity   • Alcohol use: Not Currently   • Drug use: Defer   • Sexual activity: Yes     Partners: Female       Family History   Problem Relation Age of Onset   • Heart failure Mother    • Heart failure Father    • Diabetes Maternal Grandfather    • Malig Hyperthermia Neg Hx        Review of Systems   Constitutional: Negative. Negative for fever and malaise/fatigue.   HENT: Negative.  Negative for nosebleeds and sore throat.    Eyes: Negative.  Negative for blurred vision and double vision.   Cardiovascular: Negative.  Negative for chest pain, claudication, palpitations and syncope.   Respiratory: Negative.  Negative for cough, shortness of breath and snoring.    Endocrine: Negative.  Negative for cold intolerance, heat intolerance and polydipsia.   Skin: Negative.  Negative for itching, poor wound healing and rash.   Musculoskeletal: Negative.  Negative for joint pain, joint swelling, muscle weakness and myalgias.   Gastrointestinal: Negative.  Negative for abdominal pain, melena, nausea and vomiting.   Genitourinary: Negative.    Neurological: Negative.  Negative for light-headedness, loss of balance, seizures, vertigo and weakness.   Psychiatric/Behavioral: Negative.  Negative for altered mental status and depression.   Allergic/Immunologic: Negative.        No Known Allergies      Current Outpatient Medications:   •  atorvastatin  (LIPITOR) 80 MG tablet, Take 1 tablet by mouth Daily., Disp: 90 tablet, Rfl: 3  •  carvedilol (COREG) 3.125 MG tablet, Take 1 tablet by mouth 2 (Two) Times a Day With Meals., Disp: 180 tablet, Rfl: 3  •  clopidogrel (PLAVIX) 75 MG tablet, Take 1 tablet by mouth Daily., Disp: 90 tablet, Rfl: 3  •  escitalopram (LEXAPRO) 10 MG tablet, Take 10 mg by mouth Daily., Disp: , Rfl:   •  furosemide (LASIX) 40 MG tablet, Take 1 tablet by mouth Daily., Disp: 90 tablet, Rfl: 3  •  lisinopril (PRINIVIL,ZESTRIL) 20 MG tablet, Take 1 tablet by mouth Daily., Disp: 90 tablet, Rfl: 3  •  potassium chloride (K-DUR,KLOR-CON) 20 MEQ CR tablet, Take 1 tablet by mouth Daily., Disp: 90 tablet, Rfl: 3  •  pregabalin (LYRICA) 150 MG capsule, Take 1 capsule by mouth 2 (Two) Times a Day., Disp: , Rfl:       Objective:     There were no vitals filed for this visit.  There is no height or weight on file to calculate BMI.     PHYSICAL EXAM:    Constitutional:       Appearance: Healthy appearance. Well-developed and not in distress.   Eyes:      General: No scleral icterus.     Conjunctiva/sclera: Conjunctivae normal.   HENT:      Head: Normocephalic and atraumatic.   Neck:      Thyroid: No thyromegaly.      Vascular: Normal carotid pulses. No carotid bruit, hepatojugular reflux, JVD or JVR. JVD normal.      Trachea: No tracheal deviation.   Pulmonary:      Effort: Pulmonary effort is normal. No respiratory distress.      Breath sounds: Normal breath sounds. No decreased breath sounds. No wheezing. No rhonchi. No rales.   Chest:      Chest wall: Not tender to palpatation.   Cardiovascular:      PMI at left midclavicular line. Normal rate. Regular rhythm. Normal S1. Normal S2.      Murmurs: There is no murmur.      No gallop. No click. No rub.   Edema:     Peripheral edema absent.   Abdominal:      General: Bowel sounds are normal. There is no distension.      Palpations: Abdomen is soft.      Tenderness: There is no abdominal tenderness.    Musculoskeletal: Normal range of motion.         General: No tenderness or deformity.      Cervical back: Normal range of motion and neck supple. Skin:     General: Skin is warm and dry.      Findings: No erythema or rash.   Neurological:      General: No focal deficit present.      Mental Status: Alert, oriented to person, place, and time and oriented to person, place and time.      Sensory: No sensory deficit.   Psychiatric:         Behavior: Behavior normal.           ECG 12 Lead    Date/Time: 2/8/2023 11:03 AM  Performed by: Tate Viveros MD  Authorized by: Tate Viveros MD   Comparison: compared with previous ECG from 8/1/2022  Rhythm: sinus rhythm  Conduction: right bundle branch block    Clinical impression: abnormal EKG  Comments: Inferior infarct             Cardiac studies:  2D Echocardiogram 4/5/2021:  LVEF 47%, LA dilated LV, all LV wall segments contract normally, normal diastolic function  Normal RV size and systolic function  Moderately dilated LA, moderately dilated RA  All valves with normal structure  Mild MR, mild TR, normal RVSP  Normal IVC with normal IVC collapse          Assessment:      Plan:   62-year-old male with medical history of coronary artery disease, prior non-ST elevation MI, CABG with LIMA to LAD, SVG to diagonal, SVG to OM, and SVG to PDA, ischemic cardiomyopathy with mildly depressed left ventricular systolic function at 47%, mild mitral valve and tricuspid valve regurgitation, hyperlipidemia, essential hypertension, obesity, who presents to me as a transfer of care.  He has been doing very well.  He has no chest pain or dyspnea on exertion    1.  Coronary artery disease status post CABG x 4 ( LIMA to LAD, SVG to diagonal branch of the LAD, obtuse marginal branch of circumflex and posterior descending branch of the right coronary artery)  -Recommend continuing Plavix monotherapy  - Continue atorvastatin 80 mg p.o. nightly along with carvedilol therapy.   Tolerating well.    2.  Hyperlipidemia: Lipid panel in target range except HDL low.  On high-dose statin.  No changes indicated.  No myalgias.    3.  Obesity: Increase exercise activity and watch caloric intake.    4.  Chronic right bundle branch block    5.  Hypertension: Blood pressure well controlled no changes indicated.  Continue lisinopril and carvedilol therapy current dose         Your medication list          Accurate as of February 8, 2023 10:45 AM. If you have any questions, ask your nurse or doctor.            CONTINUE taking these medications      Instructions Last Dose Given Next Dose Due   atorvastatin 80 MG tablet  Commonly known as: LIPITOR      Take 1 tablet by mouth Daily.       carvedilol 3.125 MG tablet  Commonly known as: COREG      Take 1 tablet by mouth 2 (Two) Times a Day With Meals.       clopidogrel 75 MG tablet  Commonly known as: PLAVIX      Take 1 tablet by mouth Daily.       escitalopram 10 MG tablet  Commonly known as: LEXAPRO      Take 10 mg by mouth Daily.       furosemide 40 MG tablet  Commonly known as: LASIX      Take 1 tablet by mouth Daily.       lisinopril 20 MG tablet  Commonly known as: PRINIVIL,ZESTRIL      Take 1 tablet by mouth Daily.       potassium chloride 20 MEQ CR tablet  Commonly known as: K-DUR,KLOR-CON      Take 1 tablet by mouth Daily.       pregabalin 150 MG capsule  Commonly known as: LYRICA      Take 1 capsule by mouth 2 (Two) Times a Day.

## 2023-07-31 RX ORDER — LISINOPRIL 20 MG/1
TABLET ORAL
Qty: 90 TABLET | Refills: 0 | Status: SHIPPED | OUTPATIENT
Start: 2023-07-31

## 2023-07-31 RX ORDER — CLOPIDOGREL BISULFATE 75 MG/1
TABLET ORAL
Qty: 90 TABLET | Refills: 0 | Status: SHIPPED | OUTPATIENT
Start: 2023-07-31

## 2023-08-14 ENCOUNTER — TELEPHONE (OUTPATIENT)
Dept: CARDIOLOGY | Facility: CLINIC | Age: 63
End: 2023-08-14
Payer: COMMERCIAL

## 2023-08-14 NOTE — TELEPHONE ENCOUNTER
See message below.    The pt is needing clearance to have Bilateral Total Knee Replacement with Dr. Mati Aguiar. It is not scheduled yet and they are wanting to know how long he can hold his Plavix for.    We saw him last on 2/8/23.  Please advise.    Thanks,  Iman

## 2023-08-14 NOTE — TELEPHONE ENCOUNTER
Caller: Harvinder Vega    Relationship: Self    Best call back number: [phone]    What form or medical record are you requesting: CARDIAC CLEARANCE    Who is requesting this form or medical record from you: DR ALDO CONLEY    How would you like to receive the form or medical records (pick-up, mail, fax):    Additional notes: PATIENT NEEDS CARDIAC CLEARANCE FOR KNEE REPLACEMENT. PATIENT IS HAVING DOCTOR TO SEND OVER REQUEST.

## 2023-09-05 RX ORDER — ATORVASTATIN CALCIUM 80 MG/1
TABLET, FILM COATED ORAL
Qty: 90 TABLET | Refills: 0 | Status: SHIPPED | OUTPATIENT
Start: 2023-09-05

## 2023-09-22 RX ORDER — FUROSEMIDE 40 MG/1
TABLET ORAL
Qty: 90 TABLET | Refills: 0 | Status: SHIPPED | OUTPATIENT
Start: 2023-09-22

## 2023-10-20 RX ORDER — CARVEDILOL 3.12 MG/1
3.12 TABLET ORAL 2 TIMES DAILY WITH MEALS
Qty: 180 TABLET | Refills: 0 | Status: SHIPPED | OUTPATIENT
Start: 2023-10-20

## 2023-10-25 RX ORDER — LISINOPRIL 20 MG/1
TABLET ORAL
Qty: 90 TABLET | Refills: 3 | Status: SHIPPED | OUTPATIENT
Start: 2023-10-25

## 2023-10-25 RX ORDER — CLOPIDOGREL BISULFATE 75 MG/1
TABLET ORAL
Qty: 90 TABLET | Refills: 3 | Status: SHIPPED | OUTPATIENT
Start: 2023-10-25

## 2023-11-27 RX ORDER — POTASSIUM CHLORIDE 20 MEQ/1
20 TABLET, EXTENDED RELEASE ORAL DAILY
Qty: 90 TABLET | Refills: 0 | Status: SHIPPED | OUTPATIENT
Start: 2023-11-27

## 2023-11-27 RX ORDER — ATORVASTATIN CALCIUM 80 MG/1
TABLET, FILM COATED ORAL
Qty: 90 TABLET | Refills: 0 | Status: SHIPPED | OUTPATIENT
Start: 2023-11-27

## 2023-11-27 NOTE — TELEPHONE ENCOUNTER
Last OV 2/8/23  Next OV 2/8/24  Labs 9/29/23    Does not meet protocol  Please advise    [facility]

## 2023-12-26 RX ORDER — FUROSEMIDE 40 MG/1
TABLET ORAL
Qty: 90 TABLET | Refills: 1 | Status: SHIPPED | OUTPATIENT
Start: 2023-12-26

## 2024-01-23 RX ORDER — CARVEDILOL 3.12 MG/1
3.12 TABLET ORAL 2 TIMES DAILY WITH MEALS
Qty: 180 TABLET | Refills: 0 | Status: SHIPPED | OUTPATIENT
Start: 2024-01-23

## 2024-02-19 ENCOUNTER — APPOINTMENT (OUTPATIENT)
Dept: CARDIOLOGY | Facility: HOSPITAL | Age: 64
End: 2024-02-19
Payer: MEDICARE

## 2024-02-19 ENCOUNTER — APPOINTMENT (OUTPATIENT)
Dept: CT IMAGING | Facility: HOSPITAL | Age: 64
End: 2024-02-19
Payer: MEDICARE

## 2024-02-19 ENCOUNTER — HOSPITAL ENCOUNTER (INPATIENT)
Facility: HOSPITAL | Age: 64
LOS: 2 days | Discharge: HOME | End: 2024-02-21
Attending: EMERGENCY MEDICINE | Admitting: HOSPITALIST
Payer: MEDICARE

## 2024-02-19 ENCOUNTER — APPOINTMENT (OUTPATIENT)
Dept: GENERAL RADIOLOGY | Facility: HOSPITAL | Age: 64
End: 2024-02-19
Payer: MEDICARE

## 2024-02-19 DIAGNOSIS — J81.0 FLASH PULMONARY EDEMA: ICD-10-CM

## 2024-02-19 DIAGNOSIS — R73.9 HYPERGLYCEMIA: ICD-10-CM

## 2024-02-19 DIAGNOSIS — J96.01 ACUTE HYPOXEMIC RESPIRATORY FAILURE: Primary | ICD-10-CM

## 2024-02-19 LAB
A-A DO2: 601.8 MMHG
ALBUMIN SERPL-MCNC: 3.9 G/DL (ref 3.5–5.2)
ALBUMIN/GLOB SERPL: 1.4 G/DL
ALP SERPL-CCNC: 122 U/L (ref 39–117)
ALT SERPL W P-5'-P-CCNC: 27 U/L (ref 1–41)
ANION GAP SERPL CALCULATED.3IONS-SCNC: 16 MMOL/L (ref 5–15)
APTT PPP: 23.9 SECONDS (ref 22.7–35.4)
ARTERIAL PATENCY WRIST A: POSITIVE
ARTERIAL PATENCY WRIST A: POSITIVE
AST SERPL-CCNC: 34 U/L (ref 1–40)
ATMOSPHERIC PRESS: 751 MMHG
ATMOSPHERIC PRESS: 751.8 MMHG
B PARAPERT DNA SPEC QL NAA+PROBE: NOT DETECTED
B PERT DNA SPEC QL NAA+PROBE: NOT DETECTED
BASE EXCESS BLDA CALC-SCNC: -0.2 MMOL/L (ref 0–2)
BASE EXCESS BLDA CALC-SCNC: -3.2 MMOL/L (ref 0–2)
BASOPHILS # BLD MANUAL: 0.24 10*3/MM3 (ref 0–0.2)
BASOPHILS NFR BLD MANUAL: 1.1 % (ref 0–1.5)
BDY SITE: ABNORMAL
BDY SITE: ABNORMAL
BH CV ECHO MEAS - ACS: 2.17 CM
BH CV ECHO MEAS - AO MAX PG: 6.8 MMHG
BH CV ECHO MEAS - AO MEAN PG: 3.4 MMHG
BH CV ECHO MEAS - AO ROOT DIAM: 3.8 CM
BH CV ECHO MEAS - AO V2 MAX: 130.7 CM/SEC
BH CV ECHO MEAS - AO V2 VTI: 22.5 CM
BH CV ECHO MEAS - AVA(I,D): 2.38 CM2
BH CV ECHO MEAS - EDV(CUBED): 164.1 ML
BH CV ECHO MEAS - EDV(MOD-SP2): 173 ML
BH CV ECHO MEAS - EDV(MOD-SP4): 172 ML
BH CV ECHO MEAS - EF(MOD-BP): 29.9 %
BH CV ECHO MEAS - EF(MOD-SP2): 30.1 %
BH CV ECHO MEAS - EF(MOD-SP4): 29.7 %
BH CV ECHO MEAS - ESV(CUBED): 77.7 ML
BH CV ECHO MEAS - ESV(MOD-SP2): 121 ML
BH CV ECHO MEAS - ESV(MOD-SP4): 121 ML
BH CV ECHO MEAS - FS: 22.1 %
BH CV ECHO MEAS - IVS/LVPW: 0.93 CM
BH CV ECHO MEAS - IVSD: 1.06 CM
BH CV ECHO MEAS - LAT PEAK E' VEL: 9 CM/SEC
BH CV ECHO MEAS - LV DIASTOLIC VOL/BSA (35-75): 70.5 CM2
BH CV ECHO MEAS - LV MASS(C)D: 240.1 GRAMS
BH CV ECHO MEAS - LV MAX PG: 3.2 MMHG
BH CV ECHO MEAS - LV MEAN PG: 1.53 MMHG
BH CV ECHO MEAS - LV SYSTOLIC VOL/BSA (12-30): 49.6 CM2
BH CV ECHO MEAS - LV V1 MAX: 90 CM/SEC
BH CV ECHO MEAS - LV V1 VTI: 17.4 CM
BH CV ECHO MEAS - LVIDD: 5.5 CM
BH CV ECHO MEAS - LVIDS: 4.3 CM
BH CV ECHO MEAS - LVOT AREA: 3.1 CM2
BH CV ECHO MEAS - LVOT DIAM: 1.98 CM
BH CV ECHO MEAS - LVPWD: 1.14 CM
BH CV ECHO MEAS - MED PEAK E' VEL: 5.7 CM/SEC
BH CV ECHO MEAS - MV A DUR: 0.13 SEC
BH CV ECHO MEAS - MV A MAX VEL: 54.2 CM/SEC
BH CV ECHO MEAS - MV DEC SLOPE: 306.5 CM/SEC2
BH CV ECHO MEAS - MV DEC TIME: 0.19 SEC
BH CV ECHO MEAS - MV E MAX VEL: 92.4 CM/SEC
BH CV ECHO MEAS - MV E/A: 1.71
BH CV ECHO MEAS - MV MAX PG: 3.5 MMHG
BH CV ECHO MEAS - MV MEAN PG: 1.73 MMHG
BH CV ECHO MEAS - MV P1/2T: 83 MSEC
BH CV ECHO MEAS - MV V2 VTI: 29.7 CM
BH CV ECHO MEAS - MVA(P1/2T): 2.6 CM2
BH CV ECHO MEAS - MVA(VTI): 1.81 CM2
BH CV ECHO MEAS - PA ACC TIME: 0.11 SEC
BH CV ECHO MEAS - PA V2 MAX: 77.4 CM/SEC
BH CV ECHO MEAS - PULM A REVS DUR: 0.11 SEC
BH CV ECHO MEAS - PULM A REVS VEL: 20.9 CM/SEC
BH CV ECHO MEAS - PULM DIAS VEL: 34.9 CM/SEC
BH CV ECHO MEAS - PULM S/D: 1.11
BH CV ECHO MEAS - PULM SYS VEL: 38.8 CM/SEC
BH CV ECHO MEAS - RV MAX PG: 1.09 MMHG
BH CV ECHO MEAS - RV V1 MAX: 52.3 CM/SEC
BH CV ECHO MEAS - RV V1 VTI: 8.6 CM
BH CV ECHO MEAS - SI(MOD-SP2): 21.3 ML/M2
BH CV ECHO MEAS - SI(MOD-SP4): 20.9 ML/M2
BH CV ECHO MEAS - SV(LVOT): 53.8 ML
BH CV ECHO MEAS - SV(MOD-SP2): 52 ML
BH CV ECHO MEAS - SV(MOD-SP4): 51 ML
BH CV ECHO MEAS - TAPSE (>1.6): 1.93 CM
BH CV ECHO MEAS - TR MAX PG: 18.4 MMHG
BH CV ECHO MEAS - TR MAX VEL: 214.5 CM/SEC
BH CV ECHO MEASUREMENTS AVERAGE E/E' RATIO: 12.57
BH CV XLRA - RV BASE: 3.4 CM
BH CV XLRA - RV LENGTH: 6.2 CM
BH CV XLRA - RV MID: 2.06 CM
BH CV XLRA - TDI S': 8.1 CM/SEC
BILIRUB SERPL-MCNC: 0.4 MG/DL (ref 0–1.2)
BUN BLDA-MCNC: 9 MG/DL
BUN SERPL-MCNC: 10 MG/DL (ref 8–23)
BUN/CREAT SERPL: 9.5 (ref 7–25)
C PNEUM DNA NPH QL NAA+NON-PROBE: NOT DETECTED
CA-I BLDA-SCNC: 1.16 MMOL/L (ref 2.2–2.55)
CALCIUM SPEC-SCNC: 8.7 MG/DL (ref 8.6–10.5)
CHLORIDE BLDA-SCNC: 103 MMOL/L (ref 98–107)
CHLORIDE SERPL-SCNC: 99 MMOL/L (ref 98–107)
CO2 BLDA-SCNC: 24 MMOL/L (ref 23–27)
CO2 BLDA-SCNC: 28.8 MMOL/L (ref 23–27)
CO2 SERPL-SCNC: 22 MMOL/L (ref 22–29)
CREAT BLDA-MCNC: 0.82 MG/DL (ref 0.6–130)
CREAT SERPL-MCNC: 1.05 MG/DL (ref 0.76–1.27)
D-LACTATE SERPL-SCNC: 2.1 MMOL/L (ref 0.5–2)
D-LACTATE SERPL-SCNC: 2.1 MMOL/L (ref 0.5–2)
D-LACTATE SERPL-SCNC: 2.6 MMOL/L (ref 0.5–2)
D-LACTATE SERPL-SCNC: 4.2 MMOL/L (ref 0.5–2)
D-LACTATE SERPL-SCNC: 4.4 MMOL/L (ref 0.5–2)
DEPRECATED RDW RBC AUTO: 41.9 FL (ref 37–54)
DEVICE COMMENT: ABNORMAL
EGFRCR SERPLBLD CKD-EPI 2021: 78.8 ML/MIN/1.73
ERYTHROCYTE [DISTWIDTH] IN BLOOD BY AUTOMATED COUNT: 12.3 % (ref 12.3–15.4)
FLUAV SUBTYP SPEC NAA+PROBE: NOT DETECTED
FLUBV RNA ISLT QL NAA+PROBE: NOT DETECTED
GLOBULIN UR ELPH-MCNC: 2.8 GM/DL
GLUCOSE BLDC GLUCOMTR-MCNC: 297 MG/DL (ref 70–130)
GLUCOSE SERPL-MCNC: 274 MG/DL (ref 65–99)
HADV DNA SPEC NAA+PROBE: NOT DETECTED
HCO3 BLDA-SCNC: 24.2 MMOL/L (ref 22–28)
HCO3 BLDA-SCNC: 27.2 MMOL/L (ref 22–28)
HCOV 229E RNA SPEC QL NAA+PROBE: NOT DETECTED
HCOV HKU1 RNA SPEC QL NAA+PROBE: NOT DETECTED
HCOV NL63 RNA SPEC QL NAA+PROBE: NOT DETECTED
HCOV OC43 RNA SPEC QL NAA+PROBE: NOT DETECTED
HCT VFR BLD AUTO: 42.4 % (ref 37.5–51)
HCT VFR BLDA CALC: 46 % (ref 38–51)
HEMODILUTION: NO
HEMODILUTION: NO
HGB BLD-MCNC: 13.8 G/DL (ref 13–17.7)
HGB BLDA-MCNC: 15.5 G/DL (ref 12–17)
HMPV RNA NPH QL NAA+NON-PROBE: NOT DETECTED
HOLD SPECIMEN: NORMAL
HOLD SPECIMEN: NORMAL
HPIV1 RNA ISLT QL NAA+PROBE: NOT DETECTED
HPIV2 RNA SPEC QL NAA+PROBE: NOT DETECTED
HPIV3 RNA NPH QL NAA+PROBE: NOT DETECTED
HPIV4 P GENE NPH QL NAA+PROBE: NOT DETECTED
INHALED O2 CONCENTRATION: 100 %
INHALED O2 CONCENTRATION: 100 %
INR PPP: 1.06 (ref 0.9–1.1)
INSPIRATORY TIME: 1
LEFT ATRIUM VOLUME INDEX: 23.9 ML/M2
LV EF 2D ECHO EST: 40 %
LYMPHOCYTES # BLD MANUAL: 4.01 10*3/MM3 (ref 0.7–3.1)
LYMPHOCYTES NFR BLD MANUAL: 7.4 % (ref 5–12)
Lab: ABNORMAL
Lab: ABNORMAL
M PNEUMO IGG SER IA-ACNC: NOT DETECTED
MAGNESIUM SERPL-MCNC: 1.8 MG/DL (ref 1.6–2.4)
MCH RBC QN AUTO: 30.7 PG (ref 26.6–33)
MCHC RBC AUTO-ENTMCNC: 32.5 G/DL (ref 31.5–35.7)
MCV RBC AUTO: 94.2 FL (ref 79–97)
MODALITY: ABNORMAL
MODALITY: ABNORMAL
MONOCYTES # BLD: 1.64 10*3/MM3 (ref 0.1–0.9)
NEUTROPHILS # BLD AUTO: 16.28 10*3/MM3 (ref 1.7–7)
NEUTROPHILS NFR BLD MANUAL: 73.4 % (ref 42.7–76)
NOTIFIED WHO: ABNORMAL
NT-PROBNP SERPL-MCNC: 760 PG/ML (ref 0–900)
O2 A-A PPRESDIFF RESPIRATORY: 0.1 MMHG
O2 A-A PPRESDIFF RESPIRATORY: 0.2 MMHG
PAW @ PEAK INSP FLOW SETTING VENT: 10 CMH2O
PCO2 BLDA: 50.8 MM HG (ref 35–45)
PCO2 BLDA: 53.8 MM HG (ref 35–45)
PH BLDA: 7.29 PH UNITS (ref 7.35–7.45)
PH BLDA: 7.31 PH UNITS (ref 7.35–7.45)
PLAT MORPH BLD: NORMAL
PLATELET # BLD AUTO: 197 10*3/MM3 (ref 140–450)
PMV BLD AUTO: 10.9 FL (ref 6–12)
PO2 BLDA: 157.6 MM HG (ref 80–100)
PO2 BLDA: 51.3 MM HG (ref 80–100)
POTASSIUM BLDA-SCNC: 4.4 MMOL/L (ref 3.5–5.2)
POTASSIUM SERPL-SCNC: 5.1 MMOL/L (ref 3.5–5.2)
PROCALCITONIN SERPL-MCNC: <0.02 NG/ML (ref 0–0.25)
PROT SERPL-MCNC: 6.7 G/DL (ref 6–8.5)
PROTHROMBIN TIME: 13.9 SECONDS (ref 11.7–14.2)
QT INTERVAL: 361 MS
QTC INTERVAL: 466 MS
RBC # BLD AUTO: 4.5 10*6/MM3 (ref 4.14–5.8)
RBC MORPH BLD: NORMAL
READ BACK: YES
RHINOVIRUS RNA SPEC NAA+PROBE: NOT DETECTED
RSV RNA NPH QL NAA+NON-PROBE: DETECTED
SAO2 % BLDCOA: 81 % (ref 92–98.5)
SAO2 % BLDCOA: 99.2 % (ref 92–98.5)
SARS-COV-2 RNA NPH QL NAA+NON-PROBE: NOT DETECTED
SET MECH RESP RATE: 12
SMUDGE CELLS BLD QL SMEAR: ABNORMAL
SODIUM BLD-SCNC: 137 MMOL/L (ref 136–145)
SODIUM SERPL-SCNC: 137 MMOL/L (ref 136–145)
TOTAL RATE: 28 BREATHS/MINUTE
TOTAL RATE: 30 BREATHS/MINUTE
TROPONIN T SERPL HS-MCNC: 30 NG/L
VARIANT LYMPHS NFR BLD MANUAL: 18.1 % (ref 19.6–45.3)
VENTILATOR MODE: ABNORMAL
VT ON VENT VENT: 500 ML
WBC NRBC COR # BLD AUTO: 22.18 10*3/MM3 (ref 3.4–10.8)
WHOLE BLOOD HOLD COAG: NORMAL
WHOLE BLOOD HOLD SPECIMEN: NORMAL

## 2024-02-19 PROCEDURE — 25510000001 IOPAMIDOL PER 1 ML: Performed by: EMERGENCY MEDICINE

## 2024-02-19 PROCEDURE — 71045 X-RAY EXAM CHEST 1 VIEW: CPT

## 2024-02-19 PROCEDURE — 0202U NFCT DS 22 TRGT SARS-COV-2: CPT | Performed by: PHYSICIAN ASSISTANT

## 2024-02-19 PROCEDURE — 83605 ASSAY OF LACTIC ACID: CPT

## 2024-02-19 PROCEDURE — 99222 1ST HOSP IP/OBS MODERATE 55: CPT | Performed by: INTERNAL MEDICINE

## 2024-02-19 PROCEDURE — 93010 ELECTROCARDIOGRAM REPORT: CPT | Performed by: INTERNAL MEDICINE

## 2024-02-19 PROCEDURE — 25010000002 FUROSEMIDE PER 20 MG: Performed by: INTERNAL MEDICINE

## 2024-02-19 PROCEDURE — 94799 UNLISTED PULMONARY SVC/PX: CPT

## 2024-02-19 PROCEDURE — 25010000002 FUROSEMIDE PER 20 MG: Performed by: PHYSICIAN ASSISTANT

## 2024-02-19 PROCEDURE — 93005 ELECTROCARDIOGRAM TRACING: CPT

## 2024-02-19 PROCEDURE — 25010000002 AZITHROMYCIN PER 500 MG: Performed by: EMERGENCY MEDICINE

## 2024-02-19 PROCEDURE — 99291 CRITICAL CARE FIRST HOUR: CPT

## 2024-02-19 PROCEDURE — 36415 COLL VENOUS BLD VENIPUNCTURE: CPT

## 2024-02-19 PROCEDURE — 25010000002 CEFTRIAXONE PER 250 MG: Performed by: EMERGENCY MEDICINE

## 2024-02-19 PROCEDURE — 25810000003 SODIUM CHLORIDE 0.9 % SOLUTION 250 ML FLEX CONT: Performed by: EMERGENCY MEDICINE

## 2024-02-19 PROCEDURE — 83735 ASSAY OF MAGNESIUM: CPT | Performed by: PHYSICIAN ASSISTANT

## 2024-02-19 PROCEDURE — 25010000002 CEFTRIAXONE PER 250 MG: Performed by: HOSPITALIST

## 2024-02-19 PROCEDURE — 71275 CT ANGIOGRAPHY CHEST: CPT

## 2024-02-19 PROCEDURE — 94761 N-INVAS EAR/PLS OXIMETRY MLT: CPT

## 2024-02-19 PROCEDURE — 85025 COMPLETE CBC W/AUTO DIFF WBC: CPT | Performed by: EMERGENCY MEDICINE

## 2024-02-19 PROCEDURE — 87040 BLOOD CULTURE FOR BACTERIA: CPT | Performed by: PHYSICIAN ASSISTANT

## 2024-02-19 PROCEDURE — 82803 BLOOD GASES ANY COMBINATION: CPT

## 2024-02-19 PROCEDURE — 36600 WITHDRAWAL OF ARTERIAL BLOOD: CPT

## 2024-02-19 PROCEDURE — 93306 TTE W/DOPPLER COMPLETE: CPT

## 2024-02-19 PROCEDURE — 83880 ASSAY OF NATRIURETIC PEPTIDE: CPT | Performed by: EMERGENCY MEDICINE

## 2024-02-19 PROCEDURE — 25510000001 PERFLUTREN (DEFINITY) 8.476 MG IN SODIUM CHLORIDE (PF) 0.9 % 10 ML INJECTION: Performed by: INTERNAL MEDICINE

## 2024-02-19 PROCEDURE — 25010000002 MIDAZOLAM PER 1 MG: Performed by: EMERGENCY MEDICINE

## 2024-02-19 PROCEDURE — 85018 HEMOGLOBIN: CPT

## 2024-02-19 PROCEDURE — 82803 BLOOD GASES ANY COMBINATION: CPT | Performed by: PHYSICIAN ASSISTANT

## 2024-02-19 PROCEDURE — 85007 BL SMEAR W/DIFF WBC COUNT: CPT | Performed by: EMERGENCY MEDICINE

## 2024-02-19 PROCEDURE — 80047 BASIC METABLC PNL IONIZED CA: CPT

## 2024-02-19 PROCEDURE — 85610 PROTHROMBIN TIME: CPT | Performed by: PHYSICIAN ASSISTANT

## 2024-02-19 PROCEDURE — 36600 WITHDRAWAL OF ARTERIAL BLOOD: CPT | Performed by: PHYSICIAN ASSISTANT

## 2024-02-19 PROCEDURE — 84145 PROCALCITONIN (PCT): CPT | Performed by: PHYSICIAN ASSISTANT

## 2024-02-19 PROCEDURE — 25010000002 AZITHROMYCIN PER 500 MG: Performed by: HOSPITALIST

## 2024-02-19 PROCEDURE — 25810000003 SODIUM CHLORIDE 0.9 % SOLUTION 250 ML FLEX CONT: Performed by: HOSPITALIST

## 2024-02-19 PROCEDURE — 80053 COMPREHEN METABOLIC PANEL: CPT | Performed by: EMERGENCY MEDICINE

## 2024-02-19 PROCEDURE — 5A09357 ASSISTANCE WITH RESPIRATORY VENTILATION, LESS THAN 24 CONSECUTIVE HOURS, CONTINUOUS POSITIVE AIRWAY PRESSURE: ICD-10-PCS | Performed by: HOSPITALIST

## 2024-02-19 PROCEDURE — 85730 THROMBOPLASTIN TIME PARTIAL: CPT | Performed by: PHYSICIAN ASSISTANT

## 2024-02-19 PROCEDURE — 93005 ELECTROCARDIOGRAM TRACING: CPT | Performed by: EMERGENCY MEDICINE

## 2024-02-19 PROCEDURE — 94660 CPAP INITIATION&MGMT: CPT

## 2024-02-19 PROCEDURE — 93306 TTE W/DOPPLER COMPLETE: CPT | Performed by: INTERNAL MEDICINE

## 2024-02-19 PROCEDURE — 84484 ASSAY OF TROPONIN QUANT: CPT | Performed by: EMERGENCY MEDICINE

## 2024-02-19 RX ORDER — SODIUM CHLORIDE 0.9 % (FLUSH) 0.9 %
10 SYRINGE (ML) INJECTION AS NEEDED
Status: DISCONTINUED | OUTPATIENT
Start: 2024-02-19 | End: 2024-02-21

## 2024-02-19 RX ORDER — MIDAZOLAM HYDROCHLORIDE 1 MG/ML
2 INJECTION INTRAMUSCULAR; INTRAVENOUS ONCE
Status: COMPLETED | OUTPATIENT
Start: 2024-02-19 | End: 2024-02-19

## 2024-02-19 RX ORDER — LISINOPRIL 5 MG/1
10 TABLET ORAL
Status: DISCONTINUED | OUTPATIENT
Start: 2024-02-19 | End: 2024-02-19

## 2024-02-19 RX ORDER — FUROSEMIDE 10 MG/ML
80 INJECTION INTRAMUSCULAR; INTRAVENOUS ONCE
Status: COMPLETED | OUTPATIENT
Start: 2024-02-19 | End: 2024-02-19

## 2024-02-19 RX ORDER — CLOPIDOGREL BISULFATE 75 MG/1
75 TABLET ORAL DAILY
Status: DISCONTINUED | OUTPATIENT
Start: 2024-02-19 | End: 2024-02-21 | Stop reason: HOSPADM

## 2024-02-19 RX ORDER — ATORVASTATIN CALCIUM 80 MG/1
80 TABLET, FILM COATED ORAL DAILY
Status: DISCONTINUED | OUTPATIENT
Start: 2024-02-19 | End: 2024-02-19

## 2024-02-19 RX ORDER — ESCITALOPRAM OXALATE 10 MG/1
10 TABLET ORAL DAILY
Status: DISCONTINUED | OUTPATIENT
Start: 2024-02-19 | End: 2024-02-21 | Stop reason: HOSPADM

## 2024-02-19 RX ORDER — PANTOPRAZOLE SODIUM 40 MG/1
40 TABLET, DELAYED RELEASE ORAL
Status: DISCONTINUED | OUTPATIENT
Start: 2024-02-20 | End: 2024-02-19

## 2024-02-19 RX ORDER — ATORVASTATIN CALCIUM 20 MG/1
40 TABLET, FILM COATED ORAL NIGHTLY
Status: DISCONTINUED | OUTPATIENT
Start: 2024-02-19 | End: 2024-02-20

## 2024-02-19 RX ORDER — MIDAZOLAM HYDROCHLORIDE 1 MG/ML
2 INJECTION INTRAMUSCULAR; INTRAVENOUS ONCE
Status: DISCONTINUED | OUTPATIENT
Start: 2024-02-19 | End: 2024-02-19

## 2024-02-19 RX ORDER — CARVEDILOL 6.25 MG/1
3.12 TABLET ORAL 2 TIMES DAILY WITH MEALS
Status: DISCONTINUED | OUTPATIENT
Start: 2024-02-19 | End: 2024-02-21 | Stop reason: HOSPADM

## 2024-02-19 RX ORDER — ASPIRIN 81 MG/1
81 TABLET, CHEWABLE ORAL DAILY
Status: DISCONTINUED | OUTPATIENT
Start: 2024-02-19 | End: 2024-02-21 | Stop reason: HOSPADM

## 2024-02-19 RX ORDER — LISINOPRIL 5 MG/1
5 TABLET ORAL
Status: DISCONTINUED | OUTPATIENT
Start: 2024-02-20 | End: 2024-02-19

## 2024-02-19 RX ORDER — FUROSEMIDE 10 MG/ML
80 INJECTION INTRAMUSCULAR; INTRAVENOUS ONCE
Status: DISCONTINUED | OUTPATIENT
Start: 2024-02-19 | End: 2024-02-19

## 2024-02-19 RX ORDER — PANTOPRAZOLE SODIUM 40 MG/1
40 TABLET, DELAYED RELEASE ORAL
Status: DISCONTINUED | OUTPATIENT
Start: 2024-02-19 | End: 2024-02-21 | Stop reason: HOSPADM

## 2024-02-19 RX ORDER — FUROSEMIDE 10 MG/ML
80 INJECTION INTRAMUSCULAR; INTRAVENOUS EVERY 12 HOURS
Status: DISCONTINUED | OUTPATIENT
Start: 2024-02-19 | End: 2024-02-21 | Stop reason: HOSPADM

## 2024-02-19 RX ORDER — GUAIFENESIN 600 MG/1
600 TABLET, EXTENDED RELEASE ORAL EVERY 12 HOURS SCHEDULED
Status: DISCONTINUED | OUTPATIENT
Start: 2024-02-19 | End: 2024-02-21 | Stop reason: HOSPADM

## 2024-02-19 RX ORDER — LISINOPRIL 2.5 MG/1
2.5 TABLET ORAL
Status: DISCONTINUED | OUTPATIENT
Start: 2024-02-20 | End: 2024-02-21 | Stop reason: HOSPADM

## 2024-02-19 RX ORDER — IPRATROPIUM BROMIDE AND ALBUTEROL SULFATE 2.5; .5 MG/3ML; MG/3ML
3 SOLUTION RESPIRATORY (INHALATION) EVERY 4 HOURS PRN
Status: DISCONTINUED | OUTPATIENT
Start: 2024-02-19 | End: 2024-02-21

## 2024-02-19 RX ORDER — BUDESONIDE AND FORMOTEROL FUMARATE DIHYDRATE 160; 4.5 UG/1; UG/1
2 AEROSOL RESPIRATORY (INHALATION)
Status: DISCONTINUED | OUTPATIENT
Start: 2024-02-19 | End: 2024-02-21 | Stop reason: HOSPADM

## 2024-02-19 RX ADMIN — CLOPIDOGREL BISULFATE 75 MG: 75 TABLET, FILM COATED ORAL at 13:55

## 2024-02-19 RX ADMIN — ASPIRIN 81 MG: 81 TABLET, CHEWABLE ORAL at 13:55

## 2024-02-19 RX ADMIN — ATORVASTATIN CALCIUM 40 MG: 20 TABLET, FILM COATED ORAL at 20:10

## 2024-02-19 RX ADMIN — MIDAZOLAM 2 MG: 1 INJECTION INTRAMUSCULAR; INTRAVENOUS at 06:22

## 2024-02-19 RX ADMIN — PANTOPRAZOLE SODIUM 40 MG: 40 TABLET, DELAYED RELEASE ORAL at 16:59

## 2024-02-19 RX ADMIN — ESCITALOPRAM OXALATE 10 MG: 10 TABLET, FILM COATED ORAL at 15:40

## 2024-02-19 RX ADMIN — LISINOPRIL 10 MG: 5 TABLET ORAL at 12:55

## 2024-02-19 RX ADMIN — CEFTRIAXONE SODIUM 1000 MG: 1 INJECTION, POWDER, FOR SOLUTION INTRAMUSCULAR; INTRAVENOUS at 13:54

## 2024-02-19 RX ADMIN — CARVEDILOL 3.12 MG: 6.25 TABLET, FILM COATED ORAL at 12:55

## 2024-02-19 RX ADMIN — GUAIFENESIN 600 MG: 600 TABLET, EXTENDED RELEASE ORAL at 13:55

## 2024-02-19 RX ADMIN — AZITHROMYCIN MONOHYDRATE 500 MG: 500 INJECTION, POWDER, LYOPHILIZED, FOR SOLUTION INTRAVENOUS at 09:31

## 2024-02-19 RX ADMIN — GUAIFENESIN 600 MG: 600 TABLET, EXTENDED RELEASE ORAL at 20:10

## 2024-02-19 RX ADMIN — AZITHROMYCIN MONOHYDRATE 500 MG: 500 INJECTION, POWDER, LYOPHILIZED, FOR SOLUTION INTRAVENOUS at 15:39

## 2024-02-19 RX ADMIN — IOPAMIDOL 95 ML: 755 INJECTION, SOLUTION INTRAVENOUS at 08:08

## 2024-02-19 RX ADMIN — FUROSEMIDE 80 MG: 10 INJECTION, SOLUTION INTRAMUSCULAR; INTRAVENOUS at 20:11

## 2024-02-19 RX ADMIN — PERFLUTREN 2 ML: 6.52 INJECTION, SUSPENSION INTRAVENOUS at 11:02

## 2024-02-19 RX ADMIN — FUROSEMIDE 80 MG: 10 INJECTION, SOLUTION INTRAMUSCULAR; INTRAVENOUS at 06:47

## 2024-02-19 RX ADMIN — CEFTRIAXONE 2000 MG: 2 INJECTION, POWDER, FOR SOLUTION INTRAMUSCULAR; INTRAVENOUS at 08:47

## 2024-02-19 RX ADMIN — BUDESONIDE AND FORMOTEROL FUMARATE DIHYDRATE 2 PUFF: 160; 4.5 AEROSOL RESPIRATORY (INHALATION) at 19:40

## 2024-02-19 NOTE — CONSULTS
Patient Name: Harvinder Vega  Age/Sex: 63 y.o. male  : 1960  MRN: [id number]    Date of Admission: 2024  Date of Encounter Visit: 24  Encounter Provider: Tate Viveros MD  Place of Service: [facility] CARDIOLOGY      Referring Provider: No ref. provider found  Patient Care Team:  Bruno Ricci MD as PCP - General (Internal Medicine)    Subjective:     Admitted/Consulted for: Flash pulmonary edema, history of cardiomyopathy    Chief Complaint: Dyspnea: Acute onset       History of Present Illness:  Harvinder Vega is a 63 y.o. male  with a medical history of coronary artery disease, hyperlipidemia, hypertension, chronic right bundle branch block, PVD and obesity.  He presented with a non-ST elevation MI in 2020.  His infarct was complicated by cardiogenic shock.  He ultimately underwent bypass surgery with LIMA to LAD and SVG to diagonal branch of the LAD, obtuse marginal branch of circumflex and posterior descending branch of the right coronary artery.  His surgery was performed in [place], [place].       He has actually done very well since that time.  His follow-up transthoracic echocardiogram showed a mildly depressed left ventricular systolic function at 45 to 50%.  He also had mild mitral and tricuspid valve regurgitation.      He presented to the ED via EMS early this morning with complaints of acute onset shortness of breath that started when he got up to the bathroom today.  He denies any recent issues with weight gain or lower extremity edema.  No prior issues with fever or chills.  He denies chest pain.  He received 2 nitroglycerin by EMS for hypertension, his O2 saturations were 67% when EMS first arrived.    Upon arrival to the ED, , RR 30, /168 and Spo2 78%.    Work up is notable for pH 7.285, pCO2 50.8, pO2 51.3 on 100% NRB  HS troponin 30 with a proBNP of 760, creatinine 1.05, lactic acid 4.2.  Blood cultures  and respiratory panel are pending.  Chest x-ray showed interstitial edema.  White count was greater than 20,000.  He was on BiPAP for short period of time given IV Lasix but is clinically improved.      Previous testing:     ECHO 4-5-21  Calculated left ventricular EF = 47.2% Estimated left ventricular EF = 47% Estimated left ventricular EF was in disagreement with the calculated left ventricular EF. Left ventricular ejection fraction appears to be 46 - 50%. Left ventricular systolic function is low normal. The left ventricular cavity is mildly dilated. Left ventricular wall thickness is consistent with concentric hypertrophy. All left ventricular wall segments contract normally. Left ventricular diastolic function was normal.  The left atrial cavity is moderately dilated. RThe right atrial cavity is moderately dilated.  Mild mitral valve regurgitation is present.  Limited 2D imaging of cardiac valves      Past Medical History:  Past Medical History:   Diagnosis Date    Acute respiratory failure     hypoxic    Arthritis     Claustrophobia     Coronary artery disease     Herniated intervertebral disc of lumbar spine     Hyperlipidemia     Low back pain     RBBB (right bundle branch block)        Past Surgical History:   Procedure Laterality Date    CORONARY ARTERY BYPASS GRAFT      GANGLION CYST EXCISION Right     ANKLE    KNEE SURGERY Right 2013    LUMBAR EPIDURAL INJECTION      LUMBAR LAMINECTOMY DISCECTOMY DECOMPRESSION Left 8/31/2017    Procedure: Left L4-5, L5-S1 laminectomy;  Surgeon: Galindo Hernandes MD;  Location: [facility];  Service:     ROTATOR CUFF REPAIR Right     SHOULDER SURGERY Right 2013    TONSILECTOMY, ADENOIDECTOMY, BILATERAL MYRINGOTOMY AND TUBES      1962       Home Medications:   (Not in a hospital admission)      Allergies:  No Known Allergies    Past Social History:  Social History     Socioeconomic History    Marital status:    Tobacco Use    Smoking status: Former     Packs/day:  "1.50     Years: 30.00     Additional pack years: 0.00     Total pack years: 45.00     Types: Cigarettes     Quit date: 11/14/2020     Years since quitting: 3.2    Smokeless tobacco: Never    Tobacco comments:     CAFFEINE USE: DIET MOUNTAIN DEW/TEA OCC.   Vaping Use    Vaping Use: Never used   Substance and Sexual Activity    Alcohol use: Not Currently    Drug use: Defer    Sexual activity: Yes     Partners: Female        Past Family History:  Family History   Problem Relation Age of Onset    Heart failure Mother     Heart failure Father     Diabetes Maternal Grandfather     Malig Hyperthermia Neg Hx          Review of Systems:  All systems reviewed. Pertinent positives identified in HPI. All other systems are negative.       Objective:     Objective:  Temp:  [98 °F (36.7 °C)] 98 °F (36.7 °C)  Heart Rate:  [] 79  Resp:  [30] 30  BP: ()/() 104/70  No intake or output data in the 24 hours ending 02/19/24 0956  Body mass index is 37.3 kg/m².      02/19/24  0612   Weight: 125 kg (275 lb)           Physical Exam:   Temp:  [98 °F (36.7 °C)] 98 °F (36.7 °C)  Heart Rate:  [] 79  Resp:  [30] 30  BP: ()/() 104/70  No intake or output data in the 24 hours ending 02/19/24 0956  Flowsheet Rows      Flowsheet Row First Filed Value   Admission Height 182.9 cm (72\") Documented at 02/19/2024 0614   Admission Weight 125 kg (275 lb) Documented at 02/19/2024 0612            General Appearance:    Alert, cooperative, in no acute distress.  Looks comfortable   Head:    Normocephalic, without obvious abnormality, atraumatic       Neck/Lymph   No adenopathy, supple, no thyromegaly, no carotid bruit, no    JVD   Lungs:   Rales base to mid lung fields bilaterally.    Cardiac:    Normal rate, regular rhythm, no murmur, no rub, no gallop   Chest Wall:  Sternotomy   GI:     Normal bowel sounds, soft, nontender, nondistended,            no rebound tenderness   Extremities:   No cyanosis, clubbing, or edema "   Circulatory/Peripheral Vascular :   Pulses palpable and equal bilaterally   Integumentary:   No bleeding or rash. Normal temperature                Lab Review:     Results from last 7 days   Lab Units 02/19/24 0618 02/19/24 0616   SODIUM mmol/L 137  --    POTASSIUM mmol/L 5.1  --    CHLORIDE mmol/L 99  --    CO2 mmol/L 22.0  --    BUN mg/dL 10  --    CREATININE mg/dL 1.05 0.82   GLUCOSE mg/dL 274*  --    CALCIUM mg/dL 8.7  --        Results from last 7 days   Lab Units 02/19/24 0618   HSTROP T ng/L 30*     Results from last 7 days   Lab Units 02/19/24 0618   WBC 10*3/mm3 22.18*   HEMOGLOBIN g/dL 13.8   HEMATOCRIT % 42.4   PLATELETS 10*3/mm3 197     Results from last 7 days   Lab Units 02/19/24 0618   INR  1.06   APTT seconds 23.9         Results from last 7 days   Lab Units 02/19/24 0618   MAGNESIUM mg/dL 1.8         Results from last 7 days   Lab Units 02/19/24 0618   PROBNP pg/mL 760.0               Imaging:    Imaging Results (Most Recent)       Procedure Component Value Units Date/Time    CT Angiogram Chest Pulmonary Embolism [[id number]] Collected: 02/19/24 0917     Updated: 02/19/24 0917    Narrative:      CT ANGIOGRAPHY OF THE CHEST WITH INTRAVENOUS CONTRAST AND 3D  RECONSTRUCTIONS     HISTORY: Acute shortness of breath. Previous bypass surgery.     FINDINGS: The CT scan was performed as an emergency procedure with CT  angiography protocol using intravenous contrast and 3D reconstructions.  The following findings are present:  1. The pulmonary arteries are well-opacified and there is no evidence of  pulmonary embolus. The thoracic aorta shows no evidence of aneurysm or  dissection.  2. There are very small bilateral pleural effusions associated with some  groundglass opacity and patchy consolidation predominantly in the lower  lobes and this may relate to resolving changes of pulmonary edema as  suggested on the portable chest x-ray performed 2.5 hours ago. I cannot  completely exclude a component of  pneumonia as a cause of the areas of  consolidation and further clinical correlation is required.  3. There is an enlarged precarinal lymph node measuring 1.2 x 2.0 cm.  There is also an enlarged right hilar lymph node measuring up to 2.4 cm.  This is nonspecific and may be reactive in this setting but followup CT  scan in 3-6 months is recommended. There is no axillary adenopathy.  4. There is no pericardial effusion. The CT images through the upper  liver, spleen, both adrenal glands, and upper poles of both kidneys are  unremarkable.           Radiation dose reduction techniques were utilized, including automated  exposure control and exposure modulation based on body size.          XR Chest 1 View [[id number]] Collected: 02/19/24 0639     Updated: 02/19/24 0644    Narrative:      Portable chest x-ray     HISTORY: Shortness of breath; previous coronary artery bypass.     TECHNIQUE: Single apical lordotic portable chest x-ray is correlated  with chest x-ray April 4, 2021.     FINDINGS: There are sternal wires. Cardiac silhouette is mildly  prominent. The pulmonary vasculature is diffusely engorged and there is  interstitial edema with suspected right pleural effusion layering  posteriorly, although body wall soft tissue shadow might create this  appearance. No pneumothorax.       Impression:      Volume overload or heart failure with interstitial edema and  possible right pleural effusion.     This report was finalized on 2/19/2024 6:41 AM by Dr. Mati Cancino M.D on Workstation: [id number]               Results for orders placed during the hospital encounter of 04/01/21    Adult Transthoracic Echo Complete W/ Cont if Necessary Per Protocol    Interpretation Summary  · Calculated left ventricular EF = 47.2% Estimated left ventricular EF = 47% Estimated left ventricular EF was in disagreement with the calculated left ventricular EF. Left ventricular ejection fraction appears to be 46 - 50%. Left ventricular  systolic function is low normal. The left ventricular cavity is mildly dilated. Left ventricular wall thickness is consistent with concentric hypertrophy. All left ventricular wall segments contract normally. Left ventricular diastolic function was normal.  · The left atrial cavity is moderately dilated. RThe right atrial cavity is moderately dilated.  · Mild mitral valve regurgitation is present.  · Limited 2D imaging of cardiac valves      EK-19-24      BASELINE:     23      I personally viewed and interpreted the patient's EKG/Telemetry data.    Assessment:   Assessment & Plan   1.  Acute heart failure with reduced ejection fraction  2.  Acute hypoxic respiratory failure  3.  Lactic acidosis  4.  Leukocytosis: Procalcitonin normal.  Likely stress response.  5.  Ischemic cardiomyopathy: Last echo was in  with an ejection fraction of about 45 to 50%.  6.  Coronary disease with prior CABG: No chest pain.    -Patient presented with what seems to be acute heart failure with reduced ejection fraction in the setting of severe hypertension.  Blood pressure is fine currently after diuresis and BiPAP.  -In the setting of lack of infectious symptoms and a normal procalcitonin I think it is unlikely that he has a pneumonia.  - Recommend continuing IV Lasix at 80 mg IV every 12 hours.  - Restart home antihypertensives with hold parameters.  Blood pressure is a little bit lower at this point in time.  Hold for systolic less than 100 mmHg.  - Echo has been ordered.  - No indication for ischemic workup currently.  Chest pain-free.  Will follow-up echo.    Thank you for allowing me to participate in the care of Harvinder Vega. Feel free to contact me directly with any further questions or concerns.    Tate Viveros MD  [facility]  24  09:56 EST

## 2024-02-19 NOTE — ED PROVIDER NOTES
Pt presents to the ED c/o acute shortness of breath this morning when he woke up to go to the bathroom.  No recent illnesses, no chest pain, no peripheral edema.  Has a history of coronary artery disease status post CABG.  Currently on BiPAP is feeling better.  Reportedly received 2 nitro prior to arrival for severe hypertension, oxygen saturation 67% on room air on EMS arrival to the house.     On exam,   General: Mildly ill-appearing, non-diaphoretic  HEENT: Mucous membranes moist, atraumatic, EOMI  Neck: Full ROM  Pulm: Symmetric chest rise, nonlabored, lungs slightly diminished bilaterally with scattered rales  Cardiovascular: Regular rate and rhythm, intact distal pulses, no significant peripheral edema  GI: Soft, nontender, nondistended, no rebound, no guarding, bowel sounds present  MSK: Full ROM, no deformity  Skin: Warm, dry  Neuro: Awake, alert, oriented x 4, GCS 15, moving all extremities, no focal deficits  Psych: Calm, cooperative      EKG - Per my independent interpretation:    EKG Time: 0620  Rhythm/Rate: Sinus tachycardia with a rate of 100  Left axis deviation  Incomplete right bundle branch block  No acute ischemic changes  No STEMI       Increased rate as compared to 2021    Critical Care    Performed by: Jonathan Kennedy MD  Authorized by: Jonathan Kennedy MD    Critical care provider statement:     Critical care time (minutes):  35    Critical care time was exclusive of:  Separately billable procedures and treating other patients    Critical care was necessary to treat or prevent imminent or life-threatening deterioration of the following conditions:  Respiratory failure and cardiac failure    Critical care was time spent personally by me on the following activities:  Development of treatment plan with patient or surrogate, discussions with consultants, evaluation of patient's response to treatment, ordering and performing treatments and interventions, ordering and review of laboratory studies,  ordering and review of radiographic studies, pulse oximetry, re-evaluation of patient's condition and review of old charts    Care discussed with: admitting provider      Care discussed with comment:  Dr. Viveros, Cardiology; Dr. Garza, ICU; Dr. Marie, admitting; Respiratory therapist          Plan: Initial concerns for flash pulmonary edema, PE, pneumothorax, renal failure, CHF, arrhythmia, electrolyte abnormalities, anemia, pneumonia, among others.  Consideration of the lower concern for this point ACS, aortic dissection.  Patient improving on BiPAP, continue to wait on labs and imaging results, continue to monitor blood pressure.    ED Course as of 02/19/24 1304   Mon Feb 19, 2024   0700 pO2, Arterial(!!): 51.3  Pt placed on BiPAP [MP]   0703 XR Chest 1 View  Per my independent interpretation of the chest x-ray, no evidence of pneumothorax [DC]   0703 pH, Arterial(!!): 7.285 [DC]   0703 pCO2, Arterial(!): 50.8 [DC]   0703 Glucose(!): 274 [DC]   0703 BUN: 10 [DC]   0703 Creatinine: 1.05 [DC]   0703 Sodium: 137 [DC]   0703 Potassium: 5.1 [DC]   0703 HS Troponin T(!): 30 [DC]   0703 proBNP: 760.0 [DC]   0703 Procalcitonin: <0.02 [DC]   0703 Magnesium: 1.8 [DC]   0705 Pt care assumed by Dr. Kennedy [MP]   0707 Patient feeling much improved at this point but still on BiPAP.  Will keep him on the BiPAP for now but could potentially come off of it later this morning if symptoms continue to progress.  Blood pressure 145/80.  [DC]   0813 pH, Arterial(!): 7.311 [DC]   0813 pCO2, Arterial(!): 53.8 [DC]   0817 CT Angiogram Chest Pulmonary Embolism  Per my independent interpretation of the CT angiogram of the chest, bilateral mid to lower lobe opacity no overtly obvious pulmonary emboli although subtle finding could be missed and will defer to final radiology report [DC]   0824 CT Angiogram Chest Pulmonary Embolism  Discussed with Dr. Carvalho, Radiologist, patient with fluffy opacities in the bilateral lower lobes which could  represent edema versus potentially pneumonia. No PE. Enlarged pre-carinal lymph node, recommends 3 month follow up imaging [DC]   0833 While at this point I suspect more likely flash pulmonary edema, given the leukocytosis and elevated lactic acid I will cover with the first dose of antibiotics just in case there is a component of pneumonia here. [DC]   0848 Discussed with Dr. Marie, [facility], discussed patient's clinical course advisedly, treatment modalities, improved clinical condition.  He does request consulting with the ICU first [DC]   0854 Patient has been taken off of BiPAP and is now on nasal cannula and will continue to try to wean oxygenation.  He is overall much improved appearance, feeling much better, no significant increased work of breathing at this time [DC]   0914 Discussed with Dr. Viveros, Cardiology, discussed patient's clinical course and findings today, treatment modalities, and they will consult [DC]   0939 Dr. Viveros has evaluated at bedside, he feels patient can likely go telemetry [DC]   0949 Discussed with Dr. Garza, ICU, discussed patient's clinical course and findings today, treatment modalities, and he does not feel that patient needs to be in the ICU at this time [DC]   0952 Dr. Marie will accept to his service [DC]      ED Course User Index  [DC] Jonathan Kennedy MD  [MP] Charlene Bennett, PASeanC       Plan for hospitalization for further ongoing management support of what appears to be flash pulmonary edema.  Labs are overall fairly reassuring, no overt evidence of pneumonia, BNP is actually fairly reasonable at 760 but given the severe hypoxia and hypertension and abnormal lung sounds I do think flash pulmonary edema is more likely.    Suspect flash pulmonary edema, may be a component pneumonia and will cover with antibiotics.  Has been able to come off of BiPAP without significant difficulty.     MD Attestation Note    SHARED VISIT: This visit was performed by BOTH a physician and an  APC. The substantive portion of the medical decision making was performed by this attesting physician who made or approved the management plan and takes responsibility for patient management. All studies in the APC note (if performed) were independently interpreted by me.                   Jonathan Kennedy MD  02/19/24 8525

## 2024-02-19 NOTE — CONSULTS
Patient Identification:  Harvinder Vega  63 y.o.  male  1960  [id number]          LOS 0    Requesting physician: Dr. Marie    Reason for Consultation: Acute hypoxemic respiratory failure    History of Present Illness:   63-year-old male presented with acute shortness of breath that started early this morning when he woke up.  Shortness of breath has worsened this morning.  He denies productive cough or chest pain.  Chest x-ray shows vascular congestion.  Initially had evidence of hypercapnic failure with acidosis in addition to hypoxemia but this has improved with NIPPV.  He is now off BiPAP and on nasal cannula oxygen.  Labs, films reviewed.  Discussed with ER physician.  Consultation to help with respiratory failure.    Past Medical History:  Past Medical History:   Diagnosis Date    Acute respiratory failure     hypoxic    Arthritis     Claustrophobia     Coronary artery disease     Herniated intervertebral disc of lumbar spine     Hyperlipidemia     Low back pain     RBBB (right bundle branch block)        Past Surgical History:  Past Surgical History:   Procedure Laterality Date    CORONARY ARTERY BYPASS GRAFT      GANGLION CYST EXCISION Right     ANKLE    KNEE SURGERY Right 2013    LUMBAR EPIDURAL INJECTION      LUMBAR LAMINECTOMY DISCECTOMY DECOMPRESSION Left 8/31/2017    Procedure: Left L4-5, L5-S1 laminectomy;  Surgeon: Galindo Hernandes MD;  Location: [facility]  Service:     ROTATOR CUFF REPAIR Right     SHOULDER SURGERY Right 2013    TONSILECTOMY, ADENOIDECTOMY, BILATERAL MYRINGOTOMY AND TUBES      1962        Home Meds:  (Not in a hospital admission)        Allergies:  No Known Allergies    Social History:   Social History     Socioeconomic History    Marital status:    Tobacco Use    Smoking status: Former     Packs/day: 1.50     Years: 30.00     Additional pack years: 0.00     Total pack years: 45.00     Types: Cigarettes     Quit date: 11/14/2020     Years since quitting: 3.2     "Smokeless tobacco: Never    Tobacco comments:     CAFFEINE USE: DIET MOUNTAIN DEW/TEA OCC.   Vaping Use    Vaping Use: Never used   Substance and Sexual Activity    Alcohol use: Not Currently    Drug use: Defer    Sexual activity: Yes     Partners: Female       Family History:  Family History   Problem Relation Age of Onset    Heart failure Mother     Heart failure Father     Diabetes Maternal Grandfather     Malig Hyperthermia Neg Hx        Review of Systems:  Denies fevers or chills  Denies nausea or vomiting  No new vision or hearing changes  No chest pain  Shortness of breath  No diarrhea, hematemesis or hematochezia, no dysuria or frequency  No musculoskeletal complaints  No heat or cold intolerance  No skin rashes  No dizziness or confusion.  No seizure activity  No new anxiety or depression  12 system review of systems performed and all else negative    Objective:    PHYSICAL EXAM:    /76   Pulse 73   Temp 98 °F (36.7 °C)   Resp (!) 30   Ht 182.9 cm (72\")   Wt 125 kg (275 lb)   SpO2 100%   BMI 37.30 kg/m²  Body mass index is 37.3 kg/m². 100% 125 kg (275 lb)    GENERAL APPEARANCE:   Well developed  Well nourished  No acute distress   EYES:    PERRL                                                                           Conjunctivae normal  Sclerae nonicteric.  HENT:   Atraumatic, normocephalic  External ears and nose normal  Moist mucous membranes and no ulcers  NECK:  Thyroid not enlarged  Trachea midline   RESPIRATORY:    Tachypneic  Diminished bilateral breath sounds  No rales. No wheezing  No dullness  CARDIOVASCULAR:    Tachycardic  Normal S1, S2  No murmur  Lower extremity edema: none    GI:   Bowel sounds normal  Abdomen soft , nondistended, nontender  No abdominal masses  MUSCULOSKELETAL:  Normal movement of extremities  No tenderness, no deformities  No clubbing or cyanosis   Skin:    No visible rashes  No palpable nodules  Cap refill normal.  No mottling.   PSYCHIATRIC:  Speech and " behavior appropriate  Normal mood and affect  Oriented to person, place and time  NEUROLOGIC:  Cranial nerves II through XII grossly intact.  Sensation intact.      Lab Review:   Results from last 7 days   Lab Units 02/19/24  0618 02/19/24  0616   WBC 10*3/mm3 22.18*  --    HEMOGLOBIN g/dL 13.8  --    HEMOGLOBIN, POC g/dL  --  15.5   HEMATOCRIT % 42.4  --    HEMATOCRIT POC %  --  46   PLATELETS 10*3/mm3 197  --      Results from last 7 days   Lab Units 02/19/24  0618 02/19/24  0616   SODIUM mmol/L 137  --    POTASSIUM mmol/L 5.1  --    CHLORIDE mmol/L 99  --    CO2 mmol/L 22.0  --    BUN mg/dL 10  --    CREATININE mg/dL 1.05 0.82   CALCIUM mg/dL 8.7  --    BILIRUBIN mg/dL 0.4  --    ALK PHOS U/L 122*  --    ALT (SGPT) U/L 27  --    AST (SGOT) U/L 34  --    GLUCOSE mg/dL 274*  --      Results from last 7 days   Lab Units 02/19/24  0710   PH, ARTERIAL pH units 7.311*   PO2 ART mm Hg 157.6*   PCO2, ARTERIAL mm Hg 53.8*   HCO3 ART mmol/L 27.2         Lab 02/19/24  0650 02/19/24  0616   LACTATE 4.4* 4.2*      Procalitonin Results:      Lab 02/19/24  0618   PROCALCITONIN <0.02     Lab Results   Lab Value Date/Time    TROPONINT 30 (H) 02/19/2024 0618    TROPONINT 0.075 (C) 04/03/2021 0522    TROPONINT 0.044 (C) 04/02/2021 1410    TROPONINT 0.112 (C) 04/02/2021 0508    TROPONINT <0.010 04/01/2021 2342                     Imaging reviewed  chest X-ray           Most recent 2D echo in our system/4/21 shows EF 47%.  Mildly dilated left ventricle.  Moderately dilated left atrial cavity.  Moderately dilated right atrial cavity.          Assessment:  Acute hypoxemic and hypercapnic respiratory failure  Acute CHF with pulmonary edema  COPD  Lactic acidosis  History of tobacco use: 45-pack-year history quit 2020  Leukocytosis and potential infiltrates with pneumonia versus all edema  CAD with history of CABG        Recommendations:  Continue antibiotics for potential multifocal pneumonia but I favor this to be predominantly  pulmonary edema from CHF.  He has received a dose of Lasix and oxygenation is improving.  Able to come off NIPPV and acidosis improved.  Wean oxygen for goal saturation greater than 90%.  Bronchodilators as needed for COPD symptoms.    Thank you for allowing me to participate in the care of this patient.  I will continue to follow along with you.      Denny Garza MD  [facility], [facility]  Pulmonary and Critical Care Medicine    2/19/2024  09:47 EST

## 2024-02-19 NOTE — PLAN OF CARE
Goal Outcome Evaluation:  Plan of Care Reviewed With: patient        Progress: improving   New to floor from ED for hypoxic respiratory failure. Transition from 6 liters highflow to room air. IV antibiotics started.

## 2024-02-19 NOTE — ED PROVIDER NOTES
EMERGENCY DEPARTMENT ENCOUNTER  Room Number:  16/16  PCP: Bruno Ricci MD  Independent Historians: Patient and EMS      HPI:  Chief Complaint: had concerns including Respiratory Distress.     A complete HPI/ROS/PMH/PSH/SH/FH are unobtainable due to: Dyspnea    Chronic or social conditions impacting patient care (Social Determinants of Health): None      Context: Harvinder Vega is a 63 y.o. male with a medical history of hypertension, hyperlipidemia, and CAD status post CABG who presents to the ED c/o acute dyspnea.  Patient reports he got up to go to the bathroom approximately 3 hours ago and became short of air.  Dyspnea progressively worsened.  He denies chest pain, syncope, headache, fever, leg swelling.  No injury or trauma to the chest.  No known sick contacts.  Patient on Plavix.  History otherwise limited as patient is in moderate respiratory distress.      Review of prior external notes (non-ED) -and- Review of prior external test results outside of this encounter:  Patient seen in office by PCP on 10/5/2023 for mixed anxiety and depression.  Reviewed assessment and plan.  Patient referred to psychology.  Reviewed labs collected on 11/27/2023.  CBC with hemoglobin 13.1, BMP with creatinine 1.20.    Prescription drug monitoring program review:     N/A    PAST MEDICAL HISTORY  Active Ambulatory Problems     Diagnosis Date Noted    Spinal stenosis of lumbar region 08/09/2017    Mixed hyperlipidemia 01/27/2021    Over weight 01/27/2021    Acute ST elevation myocardial infarction (STEMI) 01/27/2021    Acute hypoxemic respiratory failure 04/02/2021    Ischemic cardiomyopathy 04/14/2021    Right bundle branch block 05/05/2021    Essential hypertension 06/28/2022    Coronary artery disease involving native coronary artery of native heart without angina pectoris 07/31/2022    S/P CABG (coronary artery bypass graft) 07/31/2022     Resolved Ambulatory Problems     Diagnosis Date Noted    Coronary artery disease  involving coronary bypass graft of native heart without angina pectoris 01/27/2021     Past Medical History:   Diagnosis Date    Acute respiratory failure     Arthritis     Claustrophobia     Coronary artery disease     Herniated intervertebral disc of lumbar spine     Hyperlipidemia     Low back pain     RBBB (right bundle branch block)          PAST SURGICAL HISTORY  Past Surgical History:   Procedure Laterality Date    CORONARY ARTERY BYPASS GRAFT      GANGLION CYST EXCISION Right     ANKLE    KNEE SURGERY Right 2013    LUMBAR EPIDURAL INJECTION      LUMBAR LAMINECTOMY DISCECTOMY DECOMPRESSION Left 8/31/2017    Procedure: Left L4-5, L5-S1 laminectomy;  Surgeon: Galindo Hernandes MD;  Location: [facility] OR;  Service:     ROTATOR CUFF REPAIR Right     SHOULDER SURGERY Right 2013    TONSILECTOMY, ADENOIDECTOMY, BILATERAL MYRINGOTOMY AND TUBES      1962         FAMILY HISTORY  Family History   Problem Relation Age of Onset    Heart failure Mother     Heart failure Father     Diabetes Maternal Grandfather     Malig Hyperthermia Neg Hx          SOCIAL HISTORY  Social History     Socioeconomic History    Marital status:    Tobacco Use    Smoking status: Former     Packs/day: 1.50     Years: 30.00     Additional pack years: 0.00     Total pack years: 45.00     Types: Cigarettes     Quit date: 11/14/2020     Years since quitting: 3.2    Smokeless tobacco: Never    Tobacco comments:     CAFFEINE USE: DIET MOUNTAIN DEW/TEA OCC.   Vaping Use    Vaping Use: Never used   Substance and Sexual Activity    Alcohol use: Not Currently    Drug use: Defer    Sexual activity: Yes     Partners: Female         ALLERGIES  Patient has no known allergies.      REVIEW OF SYSTEMS  Review of Systems   Constitutional:  Negative for chills and fever.   HENT:  Negative for ear pain and sore throat.    Respiratory:  Positive for shortness of breath. Negative for cough.    Cardiovascular:  Negative for chest pain and palpitations.    Gastrointestinal:  Negative for abdominal pain and vomiting.   Genitourinary:  Negative for dysuria and hematuria.   Musculoskeletal:  Negative for arthralgias and joint swelling.   Skin:  Negative for pallor and rash.   Neurological:  Negative for syncope and headaches.   Psychiatric/Behavioral:  Negative for confusion and hallucinations.      Included in HPI  All systems reviewed and negative except for those discussed in HPI.      PHYSICAL EXAM    I have reviewed the triage vital signs and nursing notes.    ED Triage Vitals   Temp Heart Rate Resp BP SpO2   02/19/24 0614 02/19/24 0611 02/19/24 0611 02/19/24 0611 02/19/24 0611   98 °F (36.7 °C) (!) 126 (!) 30 (!) 225/168 (!) 78 %      Temp src Heart Rate Source Patient Position BP Location FiO2 (%)   -- 02/19/24 0611 -- -- --    Monitor          Physical Exam  Constitutional:       General: He is not in acute distress.     Appearance: Normal appearance.   HENT:      Head: Normocephalic and atraumatic.      Nose: Nose normal.      Mouth/Throat:      Mouth: Mucous membranes are moist.   Eyes:      Conjunctiva/sclera: Conjunctivae normal.      Pupils: Pupils are equal, round, and reactive to light.   Cardiovascular:      Rate and Rhythm: Regular rhythm. Tachycardia present.      Pulses: Normal pulses.      Heart sounds: Normal heart sounds.      Comments: Distal pulses intact  Pulmonary:      Effort: Tachypnea and respiratory distress present.      Breath sounds: Normal breath sounds.   Abdominal:      General: There is distension.   Musculoskeletal:         General: Normal range of motion.      Cervical back: Normal range of motion and neck supple.   Skin:     General: Skin is warm.      Capillary Refill: Capillary refill takes less than 2 seconds.   Neurological:      General: No focal deficit present.      Mental Status: He is alert and oriented to person, place, and time.   Psychiatric:         Mood and Affect: Mood normal.           LAB RESULTS  Recent Results  (from the past 24 hour(s))   POC Lactate    Collection Time: 02/19/24  6:16 AM    Specimen: Arterial Blood   Result Value Ref Range    Lactate 4.2 (C) 0.5 - 2.0 mmol/L    Notified Time      Notified Who dr marques     Read Back Yes    POC Chem Panel    Collection Time: 02/19/24  6:16 AM    Specimen: Arterial Blood   Result Value Ref Range    Glucose 297 (H) 70 - 130 mg/dL    Sodium 137 136 - 145 mmol/L    POC Potassium 4.4 3.5 - 5.2 mmol/L    Ionized Calcium 1.16 (L) 2.20 - 2.55 mmol/L    Chloride 103 98 - 107 mmol/L    Creatinine 0.82 0.60 - 130.00 mg/dL    BUN 9 mg/dL    CO2 Content 24.0 23 - 27 mmol/L    Notified Who dr marques     Read Back Yes    POC H&H    Collection Time: 02/19/24  6:16 AM    Specimen: Arterial Blood   Result Value Ref Range    Hemoglobin 15.5 12.0 - 17.0 g/dL    Hematocrit 46 38 - 51 %   Blood Gas, Arterial -    Collection Time: 02/19/24  6:16 AM    Specimen: Arterial Blood   Result Value Ref Range    Site Arterial: right radial     Orlando's Test Positive     pH, Arterial 7.285 (C) 7.350 - 7.450 pH units    pCO2, Arterial 50.8 (H) 35.0 - 45.0 mm Hg    pO2, Arterial 51.3 (C) 80.0 - 100.0 mm Hg    HCO3, Arterial 24.2 22.0 - 28.0 mmol/L    Base Excess, Arterial -3.2 (L) 0.0 - 2.0 mmol/L    O2 Saturation, Arterial 81.0 (L) 92.0 - 98.5 %    A-a DO2 601.8 mmHg    A-a DO2 0.1 mmHg    Barometric Pressure for Blood Gas 751.0000 mmHg    Modality NRB     FIO2 100 %    Rate 28 Breaths/minute    Notified Who dr marques     Read Back Yes     Notified Time      Hemodilution No    Comprehensive Metabolic Panel    Collection Time: 02/19/24  6:18 AM    Specimen: Blood   Result Value Ref Range    Glucose 274 (H) 65 - 99 mg/dL    BUN 10 8 - 23 mg/dL    Creatinine 1.05 0.76 - 1.27 mg/dL    Sodium 137 136 - 145 mmol/L    Potassium 5.1 3.5 - 5.2 mmol/L    Chloride 99 98 - 107 mmol/L    CO2 22.0 22.0 - 29.0 mmol/L    Calcium 8.7 8.6 - 10.5 mg/dL    Total Protein 6.7 6.0 - 8.5 g/dL    Albumin 3.9 3.5 - 5.2 g/dL    ALT  (SGPT) 27 1 - 41 U/L    AST (SGOT) 34 1 - 40 U/L    Alkaline Phosphatase 122 (H) 39 - 117 U/L    Total Bilirubin 0.4 0.0 - 1.2 mg/dL    Globulin 2.8 gm/dL    A/G Ratio 1.4 g/dL    BUN/Creatinine Ratio 9.5 7.0 - 25.0    Anion Gap 16.0 (H) 5.0 - 15.0 mmol/L    eGFR 78.8 >60.0 mL/min/1.73   BNP    Collection Time: 02/19/24  6:18 AM    Specimen: Blood   Result Value Ref Range    proBNP 760.0 0.0 - 900.0 pg/mL   Single High Sensitivity Troponin T    Collection Time: 02/19/24  6:18 AM    Specimen: Blood   Result Value Ref Range    HS Troponin T 30 (H) <22 ng/L   Magnesium    Collection Time: 02/19/24  6:18 AM    Specimen: Blood   Result Value Ref Range    Magnesium 1.8 1.6 - 2.4 mg/dL   Procalcitonin    Collection Time: 02/19/24  6:18 AM    Specimen: Blood   Result Value Ref Range    Procalcitonin <0.02 0.00 - 0.25 ng/mL   ECG 12 Lead ED Triage Standing Order; SOA    Collection Time: 02/19/24  6:20 AM   Result Value Ref Range    QT Interval 361 ms    QTC Interval 466 ms   Blood Gas, Arterial -    Collection Time: 02/19/24  7:10 AM    Specimen: Arterial Blood   Result Value Ref Range    Site Right Radial     Orlando's Test Positive     pH, Arterial 7.311 (L) 7.350 - 7.450 pH units    pCO2, Arterial 53.8 (H) 35.0 - 45.0 mm Hg    pO2, Arterial 157.6 (H) 80.0 - 100.0 mm Hg    HCO3, Arterial 27.2 22.0 - 28.0 mmol/L    Base Excess, Arterial -0.2 (L) 0.0 - 2.0 mmol/L    O2 Saturation, Arterial 99.2 (H) 92.0 - 98.5 %    A-a DO2 0.2 mmHg    CO2 Content 28.8 (H) 23 - 27 mmol/L    Barometric Pressure for Blood Gas 751.8000 mmHg    Modality BiPap     FIO2 100 %    Ventilator Mode NIV     Set Tidal Volume 500     Set Mech Resp Rate 12     Rate 30 Breaths/minute    PIP 10 cmH2O    Hemodilution No     Inspiratory Time 1     Device Comment avaps 10-25/6 12 100%          RADIOLOGY  XR Chest 1 View    Result Date: 2/19/2024  Portable chest x-ray  HISTORY: Shortness of breath; previous coronary artery bypass.  TECHNIQUE: Single apical  lordotic portable chest x-ray is correlated with chest x-ray April 4, 2021.  FINDINGS: There are sternal wires. Cardiac silhouette is mildly prominent. The pulmonary vasculature is diffusely engorged and there is interstitial edema with suspected right pleural effusion layering posteriorly, although body wall soft tissue shadow might create this appearance. No pneumothorax.      Volume overload or heart failure with interstitial edema and possible right pleural effusion.  This report was finalized on 2/19/2024 6:41 AM by Dr. Mati Cancino M.D on Workstation: [id number]         MEDICATIONS GIVEN IN ER  Medications   sodium chloride 0.9 % flush 10 mL (has no administration in time range)   midazolam (VERSED) injection 2 mg (2 mg Intravenous Given 2/19/24 0622)         ORDERS PLACED DURING THIS VISIT:  Orders Placed This Encounter   Procedures    Blood Culture - Blood,    Blood Culture - Blood,    XR Chest 1 View    CT Angiogram Chest Pulmonary Embolism    Bunn Draw    Comprehensive Metabolic Panel    BNP    Single High Sensitivity Troponin T    CBC Auto Differential    Blood Gas, Arterial -    Protime-INR    aPTT    Magnesium    Procalcitonin    Blood Gas, Arterial -    STAT Lactic Acid, Reflex    NPO Diet NPO Type: Strict NPO    Undress & Gown    Continuous Pulse Oximetry    Vital Signs    Oxygen Therapy- Nasal Cannula; Titrate 1-6 LPM Per SpO2; 90 - 95%    POC Lactate    POC Chem Panel    POC H&H    ECG 12 Lead ED Triage Standing Order; SOA    Insert Peripheral IV    CBC & Differential    Green Top (Gel)    Lavender Top    Gold Top - SST    Light Blue Top         OUTPATIENT MEDICATION MANAGEMENT:  Current Facility-Administered Medications Ordered in Epic   Medication Dose Route Frequency Provider Last Rate Last Admin    sodium chloride 0.9 % flush 10 mL  10 mL Intravenous PRN Emergency, Triage Protocol, MD         Current Outpatient Medications Ordered in Epic   Medication Sig Dispense Refill    atorvastatin  (LIPITOR) 80 MG tablet Take 1 tablet by mouth once daily 90 tablet 0    carvedilol (COREG) 3.125 MG tablet TAKE 1 TABLET BY MOUTH TWICE DAILY WITH MEALS 180 tablet 0    clopidogrel (PLAVIX) 75 MG tablet Take 1 tablet by mouth once daily 90 tablet 3    escitalopram (LEXAPRO) 10 MG tablet Take 10 mg by mouth Daily.      furosemide (LASIX) 40 MG tablet Take 1 tablet by mouth once daily 90 tablet 1    lisinopril (PRINIVIL,ZESTRIL) 20 MG tablet Take 1 tablet by mouth once daily 90 tablet 3    potassium chloride (K-DUR,KLOR-CON) 20 MEQ CR tablet Take 1 tablet by mouth once daily 90 tablet 0    pregabalin (LYRICA) 150 MG capsule Take 1 capsule by mouth 2 (Two) Times a Day.           49 minutes of critical care provided. This time excludes other billable procedures. Time does include preparation of documents, medical consultations, review of old records, and direct bedside care. Patient is at high risk for life-threatening deterioration due to acute hypoxemic respiratory failure requiring mechanical ventilation, close cardiopulmonary monitoring, admission to the hospital.         PROGRESS, DATA ANALYSIS, CONSULTS, AND MEDICAL DECISION MAKING  All labs have been independently interpreted by me.  All radiology studies have been reviewed by me. All EKG's have been independently viewed and interpreted by me.  Discussion below represents my analysis of pertinent findings related to patient's condition, differential diagnosis, treatment plan and final disposition.    Differential diagnosis includes but is not limited to CHF, PE, pneumonia, ACS.          ED Course as of 02/19/24 0718   Mon Feb 19, 2024   0700 pO2, Arterial(!!): 51.3  Pt placed on BiPAP [MP]   0703 XR Chest 1 View  Per my independent interpretation of the chest x-ray, no evidence of pneumothorax [DC]   0703 pH, Arterial(!!): 7.285 [DC]   0703 pCO2, Arterial(!): 50.8 [DC]   0703 Glucose(!): 274 [DC]   0703 BUN: 10 [DC]   0703 Creatinine: 1.05 [DC]   0703 Sodium: 137  [DC]   0703 Potassium: 5.1 [DC]   0703 HS Troponin T(!): 30 [DC]   0703 proBNP: 760.0 [DC]   0703 Procalcitonin: <0.02 [DC]   0703 Magnesium: 1.8 [DC]   0705 Pt care assumed by Dr. Kennedy [MP]   0707 Patient feeling much improved at this point but still on BiPAP.  Will keep him on the BiPAP for now but could potentially come off of it later this morning if symptoms continue to progress.  Blood pressure 145/80.  [DC]      ED Course User Index  [DC] Jonathan Kennedy MD  [MP] Charlene Bennett PA-C             AS OF 06:26 EST VITALS:    BP - (!) 225/168  HR - 104  TEMP - 98 °F (36.7 °C)  O2 SATS - (!) 84%    COMPLEXITY OF CARE  The patient requires admission.      DIAGNOSIS  Final diagnoses:   Acute hypoxemic respiratory failure   Hyperglycemia   Flash pulmonary edema         Please note that portions of this document were completed with a voice recognition program.    Note Disclaimer: At [facility], we believe that sharing information builds trust and better relationships. You are receiving this note because you recently visited [facility]. It is possible you will see health information before a provider has talked with you about it. This kind of information can be easy to misunderstand. To help you fully understand what it means for your health, we urge you to discuss this note with your provider.         Charlene Bennett PA-C  02/19/24 0725

## 2024-02-19 NOTE — ED NOTES
Nursing report ED to floor  Harvinder Vega  63 y.o.  male    HPI :   Chief Complaint   Patient presents with    Respiratory Distress       Admitting doctor:   Jason Marie MD    Admitting diagnosis:   The primary encounter diagnosis was Acute hypoxemic respiratory failure. Diagnoses of Hyperglycemia and Flash pulmonary edema were also pertinent to this visit.    Code status:   Current Code Status       Date Active Code Status Order ID Comments User Context       Prior            Allergies:   Patient has no known allergies.    Isolation:   Enhanced Droplet/Contact     Intake and Output  No intake or output data in the 24 hours ending 02/19/24 0956    Weight:       02/19/24  0612   Weight: 125 kg (275 lb)       Most recent vitals:   Vitals:    02/19/24 0828 02/19/24 0833 02/19/24 0918 02/19/24 0933   BP: 108/73 119/52 106/76    Pulse: 85 84 76 73   Resp:       Temp:       SpO2: 99% 100% 100% 100%   Weight:       Height:           Active LDAs/IV Access:   Lines, Drains & Airways       Active LDAs       Name Placement date Placement time Site Days    Peripheral IV 02/19/24 0615 Anterior;Distal;Right Forearm 02/19/24 0615  Forearm  less than 1                    Labs (abnormal labs have a star):   Labs Reviewed   COMPREHENSIVE METABOLIC PANEL - Abnormal; Notable for the following components:       Result Value    Glucose 274 (*)     Alkaline Phosphatase 122 (*)     Anion Gap 16.0 (*)     All other components within normal limits    Narrative:     GFR Normal >60  Chronic Kidney Disease <60  Kidney Failure <15     SINGLE HSTROPONIN T - Abnormal; Notable for the following components:    HS Troponin T 30 (*)     All other components within normal limits    Narrative:     High Sensitive Troponin T Reference Range:  <14.0 ng/L- Negative Female for AMI  <22.0 ng/L- Negative Male for AMI  >=14 - Abnormal Female indicating possible myocardial injury.  >=22 - Abnormal Male indicating possible myocardial injury.   Clinicians would  have to utilize clinical acumen, EKG, Troponin, and serial changes to determine if it is an Acute Myocardial Infarction or myocardial injury due to an underlying chronic condition.        CBC WITH AUTO DIFFERENTIAL - Abnormal; Notable for the following components:    WBC 22.18 (*)     All other components within normal limits   BLOOD GAS, ARTERIAL - Abnormal; Notable for the following components:    pH, Arterial 7.285 (*)     pCO2, Arterial 50.8 (*)     pO2, Arterial 51.3 (*)     Base Excess, Arterial -3.2 (*)     O2 Saturation, Arterial 81.0 (*)     All other components within normal limits   LACTIC ACID, REFLEX - Abnormal; Notable for the following components:    Lactate 4.4 (*)     All other components within normal limits   MANUAL DIFFERENTIAL - Abnormal; Notable for the following components:    Lymphocyte % 18.1 (*)     Neutrophils Absolute 16.28 (*)     Lymphocytes Absolute 4.01 (*)     Monocytes Absolute 1.64 (*)     Basophils Absolute 0.24 (*)     All other components within normal limits   BLOOD GAS, ARTERIAL - Abnormal; Notable for the following components:    pH, Arterial 7.311 (*)     pCO2, Arterial 53.8 (*)     pO2, Arterial 157.6 (*)     Base Excess, Arterial -0.2 (*)     O2 Saturation, Arterial 99.2 (*)     CO2 Content 28.8 (*)     All other components within normal limits   POC LACTATE - Abnormal; Notable for the following components:    Lactate 4.2 (*)     All other components within normal limits   POC CHEM PANEL - Abnormal; Notable for the following components:    Glucose 297 (*)     Ionized Calcium 1.16 (*)     All other components within normal limits   BNP (IN-HOUSE) - Normal    Narrative:     This assay is used as an aid in the diagnosis of individuals suspected of having heart failure. It can be used as an aid in the diagnosis of acute decompensated heart failure (ADHF) in patients presenting with signs and symptoms of ADHF to the emergency department (ED). In addition, NT-proBNP of <300  "pg/mL indicates ADHF is not likely.    Age Range Result Interpretation  NT-proBNP Concentration (pg/mL:      <50             Positive            >450                   Gray                 300-450                    Negative             <300    50-75           Positive            >900                  Gray                300-900                  Negative            <300      >75             Positive            >1800                  Gray                300-1800                  Negative            <300   PROTIME-INR - Normal   APTT - Normal   MAGNESIUM - Normal   PROCALCITONIN - Normal    Narrative:     As a Marker for Sepsis (Non-Neonates):    1. <0.5 ng/mL represents a low risk of severe sepsis and/or septic shock.  2. >2 ng/mL represents a high risk of severe sepsis and/or septic shock.    As a Marker for Lower Respiratory Tract Infections that require antibiotic therapy:    PCT on Admission    Antibiotic Therapy       6-12 Hrs later    >0.5                Strongly Recommended  >0.25 - <0.5        Recommended   0.1 - 0.25          Discouraged              Remeasure/reassess PCT  <0.1                Strongly Discouraged     Remeasure/reassess PCT    As 28 day mortality risk marker: \"Change in Procalcitonin Result\" (>80% or <=80%) if Day 0 (or Day 1) and Day 4 values are available. Refer to [email or web address]://[email or web address]-pct-calculator.[email or web address]    Change in PCT <=80%  A decrease of PCT levels below or equal to 80% defines a positive change in PCT test result representing a higher risk for 28-day all-cause mortality of patients diagnosed with severe sepsis for septic shock.    Change in PCT >80%  A decrease of PCT levels of more than 80% defines a negative change in PCT result representing a lower risk for 28-day all-cause mortality of patients diagnosed with severe sepsis or septic shock.      HGB & HCT POC - Normal   BLOOD CULTURE   BLOOD CULTURE   RESPIRATORY PANEL PCR W/ COVID-19 (SARS-COV-2), NP SWAB IN UTM/VTP, 2 HR TAT "   RAINBOW DRAW    Narrative:     The following orders were created for panel order [place] Draw.  Procedure                               Abnormality         Status                     ---------                               -----------         ------                     Green Top (Gel)[[id number]]                                  Final result               Lavender Top[[id number]]                                     Final result               Gold Top - SST[[id number]]                                   Final result               Light Blue Top[[id number]]                                   Final result                 Please view results for these tests on the individual orders.   BLOOD GAS, ARTERIAL   LACTIC ACID, REFLEX   POC ELECTROLYTE PANEL   POC LACTATE   CBC AND DIFFERENTIAL    Narrative:     The following orders were created for panel order CBC & Differential.  Procedure                               Abnormality         Status                     ---------                               -----------         ------                     CBC Auto Differential[[id number]]        Abnormal            Final result                 Please view results for these tests on the individual orders.   GREEN TOP   LAVENDER TOP   GOLD TOP - SST   LIGHT BLUE TOP       EKG:   ECG 12 Lead ED Triage Standing Order; SOA   Preliminary Result   HEART RATE= 100  bpm   RR Interval= 600  ms   IL Interval= 144  ms   P Horizontal Axis= -29  deg   P Front Axis= 49  deg   QRSD Interval= 119  ms   QT Interval= 361  ms   QTcB= 466  ms   QRS Axis= -49  deg   T Wave Axis= 20  deg   - ABNORMAL ECG -   Sinus tachycardia   Probable left atrial enlargement   Incomplete right bundle branch block   Minimal ST elevation, lateral leads   Electronically Signed By:    Date and Time of Study: 2024-02-19 06:20:25          Meds given in ED:   Medications   sodium chloride 0.9 % flush 10 mL (has no administration in time range)   azithromycin (ZITHROMAX) 500 mg in  sodium chloride 0.9 % 250 mL IVPB-VTB (500 mg Intravenous New Bag 2/19/24 0931)   budesonide-formoterol (SYMBICORT) 160-4.5 MCG/ACT inhaler 2 puff (has no administration in time range)   ipratropium-albuterol (DUO-NEB) nebulizer solution 3 mL (has no administration in time range)   midazolam (VERSED) injection 2 mg (2 mg Intravenous Given 2/19/24 0622)   furosemide (LASIX) injection 80 mg (80 mg Intravenous Given 2/19/24 0647)   iopamidol (ISOVUE-370) 76 % injection 100 mL (95 mL Intravenous Given by Other 2/19/24 0808)   cefTRIAXone (ROCEPHIN) 2,000 mg in sodium chloride 0.9 % 100 mL IVPB-VTB (0 mg Intravenous Stopped 2/19/24 0929)       Imaging results:  XR Chest 1 View    Result Date: 2/19/2024  Volume overload or heart failure with interstitial edema and possible right pleural effusion.  This report was finalized on 2/19/2024 6:41 AM by Dr. Mati Cancino M.D on Workstation: [id number]       Ambulatory status:   - assist    Social issues:   Social History     Socioeconomic History    Marital status:    Tobacco Use    Smoking status: Former     Packs/day: 1.50     Years: 30.00     Additional pack years: 0.00     Total pack years: 45.00     Types: Cigarettes     Quit date: 11/14/2020     Years since quitting: 3.2    Smokeless tobacco: Never    Tobacco comments:     CAFFEINE USE: DIET [place] DEW/TEA OCC.   Vaping Use    Vaping Use: Never used   Substance and Sexual Activity    Alcohol use: Not Currently    Drug use: Defer    Sexual activity: Yes     Partners: Female       NIH Stroke Scale:         Selena Lee RN  02/19/24 09:56 EST

## 2024-02-19 NOTE — H&P
History and physical    Primary care physician  Dr. Bruno Ricci    Chief complaint  Shortness of breath    History of present illness  63-year-old white male with history of coronary artery disease morbidly obese obstructive sleep apnea hypertension and valvular heart disease presented to [facility] emergency room with increased shortness of breath 3 hours prior to the presentation at the hospital with no chest pain fever cough congestion night sweats weight loss or weight gain.  Patient workup in ER revealed acute hypoxic respiratory failure with pulmonary edema and also developing pneumonia admitted for management.  At the time of examination he is feeling better with diuretics and empiric antibiotics and answer all question appropriately.    PAST MEDICAL HISTORY   Acute respiratory failure      Arthritis      Claustrophobia      Coronary artery disease      Herniated intervertebral disc of lumbar spine      Hyperlipidemia      Low back pain      RBBB (right bundle branch block)        PAST SURGICAL HISTORY              Procedure Laterality Date    CORONARY ARTERY BYPASS GRAFT        GANGLION CYST EXCISION Right       ANKLE    KNEE SURGERY Right 2013    LUMBAR EPIDURAL INJECTION        LUMBAR LAMINECTOMY DISCECTOMY DECOMPRESSION Left 8/31/2017     Procedure: Left L4-5, L5-S1 laminectomy;  Surgeon: Galindo Hernandes MD;  Location: [facility] OR;  Service:     ROTATOR CUFF REPAIR Right      SHOULDER SURGERY Right 2013    TONSILECTOMY, ADENOIDECTOMY, BILATERAL MYRINGOTOMY AND TUBES         1962         FAMILY HISTORY           Problem Relation Age of Onset    Heart failure Mother      Heart failure Father      Diabetes Maternal Grandfather      Malig Hyperthermia Neg Hx       SOCIAL HISTORY                 Socioeconomic History    Marital status:    Tobacco Use    Smoking status: Former       Packs/day: 1.50       Years: 30.00       Additional pack years: 0.00       Total pack years: 45.00        "Types: Cigarettes       Quit date: 11/14/2020       Years since quitting: 3.2    Smokeless tobacco: Never    Tobacco comments:       CAFFEINE USE: DIET MOUNTAIN DEW/TEA OCC.   Vaping Use    Vaping Use: Never used   Substance and Sexual Activity    Alcohol use: Not Currently    Drug use: Defer    Sexual activity: Yes       Partners: Female         ALLERGIES  Patient has no known allergies.  Home medications reviewed     REVIEW OF SYSTEMS  Constitutional:  Negative for chills and fever.   HENT:  Negative for ear pain and sore throat.    Respiratory:  Positive for shortness of breath. Negative for cough.    Cardiovascular:  Negative for chest pain and palpitations.   Gastrointestinal:  Negative for abdominal pain and vomiting.   Genitourinary:  Negative for dysuria and hematuria.   Musculoskeletal:  Negative for arthralgias and joint swelling.   Skin:  Negative for pallor and rash.   Neurological:  Negative for syncope and headaches.   Psychiatric/Behavioral:  Negative for confusion and hallucinations.       PHYSICAL EXAM   Blood pressure 95/74, pulse 78, temperature 98.2 °F (36.8 °C), temperature source Oral, resp. rate 18, height 182.9 cm (72\"), weight 125 kg (275 lb), SpO2 98%.    Constitutional:       General: He is not in acute distress.     Appearance: Normal appearance.   HENT:      Head: Normocephalic and atraumatic.      Nose: Nose normal.      Mouth/Throat:      Mouth: Mucous membranes are moist.   Eyes:      Conjunctiva/sclera: Conjunctivae normal.      Pupils: Pupils are equal, round, and reactive to light.   Cardiovascular:      Rate and Rhythm: Regular rhythm. Tachycardia present.      Pulses: Normal pulses.      Heart sounds: Normal heart sounds.      Comments: Distal pulses intact  Pulmonary:      Effort: Tachypnea and respiratory distress present.      Breath sounds: Normal breath sounds.   Abdominal:      General: There is distension.   Musculoskeletal:         General: Normal range of motion.      " Cervical back: Normal range of motion and neck supple.   Skin:     General: Skin is warm.      Capillary Refill: Capillary refill takes less than 2 seconds.   Neurological:      General: No focal deficit present.      Mental Status: He is alert and oriented to person, place, and time.   Psychiatric:         Mood and Affect: Mood normal.     LAB RESULTS  Lab Results (last 24 hours)       Procedure Component Value Units Date/Time    STAT Lactic Acid, Reflex [[id number]] Collected: 02/19/24 1303    Specimen: Blood Updated: 02/19/24 1308    STAT Lactic Acid, Reflex [[id number]]  (Abnormal) Collected: 02/19/24 1003    Specimen: Blood Updated: 02/19/24 1058     Lactate 2.1 mmol/L     Respiratory Panel PCR w/COVID-19(SARS-CoV-2) RICH/MAGUI/RAFFAELE/PAD/COR/CAMPBELL In-House, NP Swab in UTM/VTM, 2 HR TAT - Swab, Nasopharynx [[id number]]  (Abnormal) Collected: 02/19/24 0641    Specimen: Swab from Nasopharynx Updated: 02/19/24 1054     ADENOVIRUS, PCR Not Detected     Coronavirus 229E Not Detected     Coronavirus HKU1 Not Detected     Coronavirus NL63 Not Detected     Coronavirus OC43 Not Detected     COVID19 Not Detected     Human Metapneumovirus Not Detected     Human Rhinovirus/Enterovirus Not Detected     Influenza A PCR Not Detected     Influenza B PCR Not Detected     Parainfluenza Virus 1 Not Detected     Parainfluenza Virus 2 Not Detected     Parainfluenza Virus 3 Not Detected     Parainfluenza Virus 4 Not Detected     RSV, PCR Detected     Bordetella pertussis pcr Not Detected     Bordetella parapertussis PCR Not Detected     Chlamydophila pneumoniae PCR Not Detected     Mycoplasma pneumo by PCR Not Detected    Narrative:      In the setting of a positive respiratory panel with a viral infection PLUS a negative procalcitonin without other underlying concern for bacterial infection, consider observing off antibiotics or discontinuation of antibiotics and continue supportive care. If the respiratory panel is positive for atypical  bacterial infection (Bordetella pertussis, Chlamydophila pneumoniae, or Mycoplasma pneumoniae), consider antibiotic de-escalation to target atypical bacterial infection.    Manual Differential [[id number]]  (Abnormal) Collected: 02/19/24 0618    Specimen: Blood Updated: 02/19/24 0904     Neutrophil % 73.4 %      Lymphocyte % 18.1 %      Monocyte % 7.4 %      Basophil % 1.1 %      Neutrophils Absolute 16.28 10*3/mm3      Lymphocytes Absolute 4.01 10*3/mm3      Monocytes Absolute 1.64 10*3/mm3      Basophils Absolute 0.24 10*3/mm3      RBC Morphology Normal     Smudge Cells Large/3+     Platelet Morphology Normal    STAT Lactic Acid, Reflex [[id number]]  (Abnormal) Collected: 02/19/24 0650    Specimen: Blood from Arm, Left Updated: 02/19/24 0804     Lactate 4.4 mmol/L     Protime-INR [[id number]]  (Normal) Collected: 02/19/24 0618    Specimen: Blood Updated: 02/19/24 0803     Protime 13.9 Seconds      INR 1.06    aPTT [[id number]]  (Normal) Collected: 02/19/24 0618    Specimen: Blood Updated: 02/19/24 0803     PTT 23.9 seconds     Blood Culture - Blood, Arm, Left [[id number]] Collected: 02/19/24 0650    Specimen: Blood from Arm, Left Updated: 02/19/24 0740    CBC & Differential [[id number]]  (Abnormal) Collected: 02/19/24 0618    Specimen: Blood Updated: 02/19/24 0737    Narrative:      The following orders were created for panel order CBC & Differential.  Procedure                               Abnormality         Status                     ---------                               -----------         ------                     CBC Auto Differential[[id number]]        Abnormal            Final result                 Please view results for these tests on the individual orders.    CBC Auto Differential [[id number]]  (Abnormal) Collected: 02/19/24 0618    Specimen: Blood Updated: 02/19/24 0737     WBC 22.18 10*3/mm3      RBC 4.50 10*6/mm3      Hemoglobin 13.8 g/dL      Hematocrit 42.4 %      MCV 94.2 fL      MCH 30.7 pg       MCHC 32.5 g/dL      RDW 12.3 %      RDW-SD 41.9 fl      MPV 10.9 fL      Platelets 197 10*3/mm3     [place] Draw [[id number]] Collected: 02/19/24 0618    Specimen: Blood Updated: 02/19/24 0731    Narrative:      The following orders were created for panel order [place] Draw.  Procedure                               Abnormality         Status                     ---------                               -----------         ------                     Green Top (Gel)[[id number]]                                  Final result               Lavender Top[[id number]]                                     Final result               Gold Top - SST[[id number]]                                   Final result               Light Blue Top[[id number]]                                   Final result                 Please view results for these tests on the individual orders.    Green Top (Gel) [[id number]] Collected: 02/19/24 0618    Specimen: Blood Updated: 02/19/24 0731     Extra Tube Hold for add-ons.     Comment: Auto resulted.       Lavender Top [[id number]] Collected: 02/19/24 0618    Specimen: Blood Updated: 02/19/24 0731     Extra Tube hold for add-on     Comment: Auto resulted       Gold Top - SST [[id number]] Collected: 02/19/24 0618    Specimen: Blood Updated: 02/19/24 0731     Extra Tube Hold for add-ons.     Comment: Auto resulted.       Light Blue Top [[id number]] Collected: 02/19/24 0618    Specimen: Blood Updated: 02/19/24 0731     Extra Tube Hold for add-ons.     Comment: Auto resulted       Blood Gas, Arterial - [[id number]]  (Abnormal) Collected: 02/19/24 0710    Specimen: Arterial Blood Updated: 02/19/24 0713     Site Right Radial     [place] Test Positive     pH, Arterial 7.311 pH units      pCO2, Arterial 53.8 mm Hg      pO2, Arterial 157.6 mm Hg      HCO3, Arterial 27.2 mmol/L      Base Excess, Arterial -0.2 mmol/L      Comment: Serial Number: [id number]:  [id number]        O2 Saturation, Arterial 99.2 %      A-a DO2 0.2  mmHg      CO2 Content 28.8 mmol/L      Barometric Pressure for Blood Gas 751.8000 mmHg      Modality BiPap     FIO2 100 %      Ventilator Mode NIV     Set Tidal Volume 500     Set Mech Resp Rate 12     Rate 30 Breaths/minute      PIP 10 cmH2O      Hemodilution No     Inspiratory Time 1     Device Comment avaps 10-25/6 12 100%    Blood Gas, Arterial - [[id number]]  (Abnormal) Collected: 02/19/24 0616    Specimen: Arterial Blood Updated: 02/19/24 0656     Site Arterial: right radial     Comment: Late entry due to user error  Nicholas Donahue, RRT, CPFT  2/19/2024  06:55 EST          [place] Test Positive     pH, Arterial 7.285 pH units      pCO2, Arterial 50.8 mm Hg      pO2, Arterial 51.3 mm Hg      HCO3, Arterial 24.2 mmol/L      Base Excess, Arterial -3.2 mmol/L      Comment: Serial Number: [id number]:  [id number]        O2 Saturation, Arterial 81.0 %      A-a DO2 601.8 mmHg      A-a DO2 0.1 mmHg      Barometric Pressure for Blood Gas 751.0000 mmHg      Modality NRB     FIO2 100 %      Rate 28 Breaths/minute      Notified Who dr marques     Read Back Yes     Notified Time --     Hemodilution No    BNP [[id number]]  (Normal) Collected: 02/19/24 0618    Specimen: Blood Updated: 02/19/24 0655     proBNP 760.0 pg/mL     Narrative:      This assay is used as an aid in the diagnosis of individuals suspected of having heart failure. It can be used as an aid in the diagnosis of acute decompensated heart failure (ADHF) in patients presenting with signs and symptoms of ADHF to the emergency department (ED). In addition, NT-proBNP of <300 pg/mL indicates ADHF is not likely.    Age Range Result Interpretation  NT-proBNP Concentration (pg/mL:      <50             Positive            >450                   Gray                 300-450                    Negative             <300    50-75           Positive            >900                  Gray                300-900                  Negative            <300      >75              "Positive            >1800                  Gray                300-1800                  Negative            <300    Single High Sensitivity Troponin T [[id number]]  (Abnormal) Collected: 02/19/24 0618    Specimen: Blood Updated: 02/19/24 0655     HS Troponin T 30 ng/L     Narrative:      High Sensitive Troponin T Reference Range:  <14.0 ng/L- Negative Female for AMI  <22.0 ng/L- Negative Male for AMI  >=14 - Abnormal Female indicating possible myocardial injury.  >=22 - Abnormal Male indicating possible myocardial injury.   Clinicians would have to utilize clinical acumen, EKG, Troponin, and serial changes to determine if it is an Acute Myocardial Infarction or myocardial injury due to an underlying chronic condition.         Procalcitonin [[id number]]  (Normal) Collected: 02/19/24 0618    Specimen: Blood Updated: 02/19/24 0655     Procalcitonin <0.02 ng/mL     Narrative:      As a Marker for Sepsis (Non-Neonates):    1. <0.5 ng/mL represents a low risk of severe sepsis and/or septic shock.  2. >2 ng/mL represents a high risk of severe sepsis and/or septic shock.    As a Marker for Lower Respiratory Tract Infections that require antibiotic therapy:    PCT on Admission    Antibiotic Therapy       6-12 Hrs later    >0.5                Strongly Recommended  >0.25 - <0.5        Recommended   0.1 - 0.25          Discouraged              Remeasure/reassess PCT  <0.1                Strongly Discouraged     Remeasure/reassess PCT    As 28 day mortality risk marker: \"Change in Procalcitonin Result\" (>80% or <=80%) if Day 0 (or Day 1) and Day 4 values are available. Refer to http://[email or web address]-pct-calculator.com    Change in PCT <=80%  A decrease of PCT levels below or equal to 80% defines a positive change in PCT test result representing a higher risk for 28-day all-cause mortality of patients diagnosed with severe sepsis for septic shock.    Change in PCT >80%  A decrease of PCT levels of more than 80% defines a negative " change in PCT result representing a lower risk for 28-day all-cause mortality of patients diagnosed with severe sepsis or septic shock.       Comprehensive Metabolic Panel [[id number]]  (Abnormal) Collected: 02/19/24 0618    Specimen: Blood Updated: 02/19/24 0650     Glucose 274 mg/dL      BUN 10 mg/dL      Creatinine 1.05 mg/dL      Sodium 137 mmol/L      Potassium 5.1 mmol/L      Comment: Slight hemolysis detected by analyzer. Result may be falsely elevated.        Chloride 99 mmol/L      CO2 22.0 mmol/L      Calcium 8.7 mg/dL      Total Protein 6.7 g/dL      Albumin 3.9 g/dL      ALT (SGPT) 27 U/L      AST (SGOT) 34 U/L      Comment: Slight hemolysis detected by analyzer. Result may be falsely elevated.        Alkaline Phosphatase 122 U/L      Total Bilirubin 0.4 mg/dL      Globulin 2.8 gm/dL      A/G Ratio 1.4 g/dL      BUN/Creatinine Ratio 9.5     Anion Gap 16.0 mmol/L      eGFR 78.8 mL/min/1.73     Narrative:      GFR Normal >60  Chronic Kidney Disease <60  Kidney Failure <15      Magnesium [[id number]]  (Normal) Collected: 02/19/24 0618    Specimen: Blood Updated: 02/19/24 0650     Magnesium 1.8 mg/dL     Blood Culture - Blood, Hand, Right [[id number]] Collected: 02/19/24 0638    Specimen: Blood from Hand, Right Updated: 02/19/24 0650    POC Lactate [[id number]]  (Abnormal) Collected: 02/19/24 0616    Specimen: Arterial Blood Updated: 02/19/24 0622     Lactate 4.2 mmol/L      Comment: Serial Number: [id number]:  [id number]        Notified Time --     Notified Who dr marques     Read Back Yes    POC Chem Panel [[id number]]  (Abnormal) Collected: 02/19/24 0616    Specimen: Arterial Blood Updated: 02/19/24 0622     Glucose 297 mg/dL      Comment: Serial Number: [id number]:  [id number]        Sodium 137 mmol/L      POC Potassium 4.4 mmol/L      Ionized Calcium 1.16 mmol/L      Chloride 103 mmol/L      Creatinine 0.82 mg/dL      BUN 9 mg/dL      CO2 Content 24.0 mmol/L      Notified Who dr marques     Read Back Yes    POC  H&H [[id number]]  (Normal) Collected: 02/19/24 0616    Specimen: Arterial Blood Updated: 02/19/24 0622     Hemoglobin 15.5 g/dL      Comment: Serial Number: [id number]:  [id number]        Hematocrit 46 %           Imaging Results (Last 24 Hours)       Procedure Component Value Units Date/Time    CT Angiogram Chest Pulmonary Embolism [[id number]] Collected: 02/19/24 0917     Updated: 02/19/24 1050    Narrative:      CT ANGIOGRAPHY OF THE CHEST WITH INTRAVENOUS CONTRAST AND 3D  RECONSTRUCTIONS     HISTORY: Acute shortness of breath. Previous bypass surgery.     FINDINGS: The CT scan was performed as an emergency procedure with CT  angiography protocol using intravenous contrast and 3D reconstructions.  The following findings are present:  1. The pulmonary arteries are well-opacified and there is no evidence of  pulmonary embolus. The thoracic aorta shows no evidence of aneurysm or  dissection.  2. There are very small bilateral pleural effusions associated with some  groundglass opacity and patchy consolidation predominantly in the lower  lobes and this may relate to resolving changes of pulmonary edema as  suggested on the portable chest x-ray performed 2.5 hours ago. I cannot  completely exclude a component of pneumonia as a cause of the areas of  consolidation and further clinical correlation is required.  3. There is an enlarged precarinal lymph node measuring 1.2 x 2.0 cm.  There is also an enlarged right hilar lymph node measuring up to 2.4 cm.  This is nonspecific and may be reactive in this setting but followup CT  scan in 3-6 months is recommended. There is no axillary adenopathy.  4. There is no pericardial effusion. The CT images through the upper  liver, spleen, both adrenal glands, and upper poles of both kidneys are  unremarkable.           Radiation dose reduction techniques were utilized, including automated  exposure control and exposure modulation based on body size.        This report was finalized on  2/19/2024 10:47 AM by Dr. Keven Carvalho M.D on Workstation: [id number]       XR Chest 1 View [[id number]] Collected: 02/19/24 0639     Updated: 02/19/24 0644    Narrative:      Portable chest x-ray     HISTORY: Shortness of breath; previous coronary artery bypass.     TECHNIQUE: Single apical lordotic portable chest x-ray is correlated  with chest x-ray April 4, 2021.     FINDINGS: There are sternal wires. Cardiac silhouette is mildly  prominent. The pulmonary vasculature is diffusely engorged and there is  interstitial edema with suspected right pleural effusion layering  posteriorly, although body wall soft tissue shadow might create this  appearance. No pneumothorax.       Impression:      Volume overload or heart failure with interstitial edema and  possible right pleural effusion.     This report was finalized on 2/19/2024 6:41 AM by Dr. Mati Cancino M.D on Workstation: [id number]             Results  Scan on 2/19/2024 0622 by [facility], [place]: ECG 12-LEAD         Author: -- Service: -- Author Type: --   Filed: Date of Service: Creation Time:   Status: (Other)   HEART RATE= 100  bpm  RR Interval= 600  ms  ME Interval= 144  ms  P Horizontal Axis= -29  deg  P Front Axis= 49  deg  QRSD Interval= 119  ms  QT Interval= 361  ms  QTcB= 466  ms  QRS Axis= -49  deg  T Wave Axis= 20  deg  - ABNORMAL ECG -  Sinus tachycardia  Probable left atrial enlargement  Incomplete right bundle branch block  Minimal ST elevation, lateral leads          Current Facility-Administered Medications:     atorvastatin (LIPITOR) tablet 80 mg, 80 mg, Oral, Daily, Jason Marie MD    azithromycin (ZITHROMAX) 500 mg in sodium chloride 0.9 % 250 mL IVPB-VTB, 500 mg, Intravenous, Q24H, Jason Marie MD    budesonide-formoterol (SYMBICORT) 160-4.5 MCG/ACT inhaler 2 puff, 2 puff, Inhalation, BID - RT, Denny Garza MD    carvedilol (COREG) tablet 3.125 mg, 3.125 mg, Oral, BID With Meals, Tate Viveros MD, 3.125 mg at 02/19/24  1255    cefTRIAXone (ROCEPHIN) 1,000 mg in sodium chloride 0.9 % 100 mL IVPB-VTB, 1,000 mg, Intravenous, Daily, Kayli Marie MD    clopidogrel (PLAVIX) tablet 75 mg, 75 mg, Oral, Daily, Kayli Marie MD    escitalopram (LEXAPRO) tablet 10 mg, 10 mg, Oral, Daily, Kayli Marie MD    furosemide (LASIX) injection 80 mg, 80 mg, Intravenous, Q12H, Tate Viveros MD    Influenza Vac High-Dose Quad (FLUZONE HIGH DOSE) injection 0.7 mL, 0.7 mL, Intramuscular, During Hospitalization, Kayli Marie MD    ipratropium-albuterol (DUO-NEB) nebulizer solution 3 mL, 3 mL, Nebulization, Q4H PRN, Denny Garza MD    [START ON 2/20/2024] lisinopril (PRINIVIL,ZESTRIL) tablet 2.5 mg, 2.5 mg, Oral, Q24H, Kayli Marie MD    [START ON 2/20/2024] pantoprazole (PROTONIX) EC tablet 40 mg, 40 mg, Oral, Q AM, Kayli Marie MD    sodium chloride 0.9 % flush 10 mL, 10 mL, Intravenous, PRN, Jonathan Kennedy MD     ASSESSMENT  Acute hypoxic respiratory failure  Acute pulmonary edema  Bilateral lower lobe pneumonia with sepsis  Acute history bronchitis  Morbidly obese  Obstructive sleep apnea  Coronary artery disease  COPD  Hypertension  Gastroesophageal reflux disease    PLAN  Admit  Supplemental oxygen nebulizer  BiPAP as needed  IV diuresis  IV antibiotics after obtaining cultures   Symptomatic treatment for RSV bronchitis  Serial cardiac enzymes and EKG  Check 2D echo  Cardiology consult  Pulmonary to follow patient  Adjust home medications  Stress ulcer DVT prophylaxis  Supportive care  Patient is full code  Discussed with nursing staff  Follow closely further recommendation current hospital course    KAYLI MARIE MD

## 2024-02-19 NOTE — PROGRESS NOTES
Clinical Pharmacy Services: Medication History    Harvinder Vega is a 63 y.o. male presenting to [facility] for   Chief Complaint   Patient presents with    Respiratory Distress       He  has a past medical history of Acute respiratory failure, Arthritis, Claustrophobia, Coronary artery disease, Herniated intervertebral disc of lumbar spine, Hyperlipidemia, Low back pain, and RBBB (right bundle branch block).    Allergies as of 02/19/2024    (No Known Allergies)       Medication information was obtained from: Pharmacy  Pharmacy and Phone Number:   [id number] - [place], [place] - [address] [facility] [address] [facility] [address] [place] [address] [address] [facility] [address] - [phone]  - [phone] FX  [address] [facility] [address] [address]  [facility] [address]  Phone: [phone] Fax: [phone]    [id number] - [place], [place] - [address] - [phone]  - [phone]   [address] [facility] [address]  Phone: [phone] Fax: [phone]        Prior to Admission Medications       Prescriptions Last Dose Informant Patient Reported? Taking?    atorvastatin (LIPITOR) 80 MG tablet  Pharmacy No Yes    Take 1 tablet by mouth once daily    Patient taking differently:  Take 1 tablet by mouth Daily.    carvedilol (COREG) 3.125 MG tablet  Pharmacy No Yes    TAKE 1 TABLET BY MOUTH TWICE DAILY WITH MEALS    clopidogrel (PLAVIX) 75 MG tablet  Pharmacy No Yes    Take 1 tablet by mouth once daily    Patient taking differently:  Take 1 tablet by mouth Daily.    escitalopram (LEXAPRO) 10 MG tablet  Pharmacy Yes Yes    Take 1 tablet by mouth Daily.    furosemide (LASIX) 40 MG tablet  Pharmacy No Yes    Take 1 tablet by mouth once daily    Patient taking differently:  Take 1 tablet by mouth Daily.    lisinopril (PRINIVIL,ZESTRIL) 20 MG tablet  Pharmacy No Yes    Take 1 tablet by mouth once daily    Patient taking differently:  Take 1 tablet by mouth Daily.    potassium chloride (K-DUR,KLOR-CON)  20 MEQ CR tablet  Pharmacy No Yes    Take 1 tablet by mouth once daily              Medication notes:     This medication list is complete to the best of my knowledge as of 2/19/2024    Please call if questions.    Armando Leahy  Medication History Technician  [phone]    2/19/2024 09:23 EST

## 2024-02-20 ENCOUNTER — APPOINTMENT (OUTPATIENT)
Dept: GENERAL RADIOLOGY | Facility: HOSPITAL | Age: 64
End: 2024-02-20
Payer: MEDICARE

## 2024-02-20 LAB
ALBUMIN SERPL-MCNC: 3.9 G/DL (ref 3.5–5.2)
ALBUMIN/GLOB SERPL: 1.5 G/DL
ALP SERPL-CCNC: 98 U/L (ref 39–117)
ALT SERPL W P-5'-P-CCNC: 20 U/L (ref 1–41)
ANION GAP SERPL CALCULATED.3IONS-SCNC: 11.6 MMOL/L (ref 5–15)
AST SERPL-CCNC: 35 U/L (ref 1–40)
BILIRUB SERPL-MCNC: 0.7 MG/DL (ref 0–1.2)
BUN SERPL-MCNC: 15 MG/DL (ref 8–23)
BUN/CREAT SERPL: 16 (ref 7–25)
CALCIUM SPEC-SCNC: 8.5 MG/DL (ref 8.6–10.5)
CHLORIDE SERPL-SCNC: 99 MMOL/L (ref 98–107)
CHOLEST SERPL-MCNC: 103 MG/DL (ref 0–200)
CO2 SERPL-SCNC: 26.4 MMOL/L (ref 22–29)
CREAT SERPL-MCNC: 0.94 MG/DL (ref 0.76–1.27)
D-LACTATE SERPL-SCNC: 1.7 MMOL/L (ref 0.5–2)
DEPRECATED RDW RBC AUTO: 40.3 FL (ref 37–54)
EGFRCR SERPLBLD CKD-EPI 2021: 90 ML/MIN/1.73
ERYTHROCYTE [DISTWIDTH] IN BLOOD BY AUTOMATED COUNT: 12.4 % (ref 12.3–15.4)
GEN 5 2HR TROPONIN T REFLEX: 298 NG/L
GLOBULIN UR ELPH-MCNC: 2.6 GM/DL
GLUCOSE SERPL-MCNC: 115 MG/DL (ref 65–99)
HBA1C MFR BLD: 5.6 % (ref 4.8–5.6)
HCT VFR BLD AUTO: 35.3 % (ref 37.5–51)
HDLC SERPL-MCNC: 38 MG/DL (ref 40–60)
HGB BLD-MCNC: 11.6 G/DL (ref 13–17.7)
LDLC SERPL CALC-MCNC: 43 MG/DL (ref 0–100)
LDLC/HDLC SERPL: 1.08 {RATIO}
LYMPHOCYTES # BLD MANUAL: 4.5 10*3/MM3 (ref 0.7–3.1)
LYMPHOCYTES NFR BLD MANUAL: 5.1 % (ref 5–12)
MCH RBC QN AUTO: 29.8 PG (ref 26.6–33)
MCHC RBC AUTO-ENTMCNC: 32.9 G/DL (ref 31.5–35.7)
MCV RBC AUTO: 90.7 FL (ref 79–97)
MONOCYTES # BLD: 0.9 10*3/MM3 (ref 0.1–0.9)
NEUTROPHILS # BLD AUTO: 12.26 10*3/MM3 (ref 1.7–7)
NEUTROPHILS NFR BLD MANUAL: 69.4 % (ref 42.7–76)
PLAT MORPH BLD: NORMAL
PLATELET # BLD AUTO: 179 10*3/MM3 (ref 140–450)
PMV BLD AUTO: 11.1 FL (ref 6–12)
POTASSIUM SERPL-SCNC: 3.7 MMOL/L (ref 3.5–5.2)
PROT SERPL-MCNC: 6.5 G/DL (ref 6–8.5)
RBC # BLD AUTO: 3.89 10*6/MM3 (ref 4.14–5.8)
RBC MORPH BLD: NORMAL
SMUDGE CELLS BLD QL SMEAR: ABNORMAL
SODIUM SERPL-SCNC: 137 MMOL/L (ref 136–145)
TRIGL SERPL-MCNC: 119 MG/DL (ref 0–150)
TROPONIN T DELTA: -38 NG/L
TROPONIN T SERPL HS-MCNC: 336 NG/L
TSH SERPL DL<=0.05 MIU/L-ACNC: 0.53 UIU/ML (ref 0.27–4.2)
VARIANT LYMPHS NFR BLD MANUAL: 25.5 % (ref 19.6–45.3)
VLDLC SERPL-MCNC: 22 MG/DL (ref 5–40)
WBC NRBC COR # BLD AUTO: 17.66 10*3/MM3 (ref 3.4–10.8)

## 2024-02-20 PROCEDURE — 99232 SBSQ HOSP IP/OBS MODERATE 35: CPT | Performed by: INTERNAL MEDICINE

## 2024-02-20 PROCEDURE — 83605 ASSAY OF LACTIC ACID: CPT

## 2024-02-20 PROCEDURE — 71045 X-RAY EXAM CHEST 1 VIEW: CPT

## 2024-02-20 PROCEDURE — 84443 ASSAY THYROID STIM HORMONE: CPT | Performed by: HOSPITALIST

## 2024-02-20 PROCEDURE — 80061 LIPID PANEL: CPT | Performed by: HOSPITALIST

## 2024-02-20 PROCEDURE — 85025 COMPLETE CBC W/AUTO DIFF WBC: CPT | Performed by: HOSPITALIST

## 2024-02-20 PROCEDURE — 94664 DEMO&/EVAL PT USE INHALER: CPT

## 2024-02-20 PROCEDURE — 85007 BL SMEAR W/DIFF WBC COUNT: CPT | Performed by: HOSPITALIST

## 2024-02-20 PROCEDURE — 94799 UNLISTED PULMONARY SVC/PX: CPT

## 2024-02-20 PROCEDURE — 25010000002 AZITHROMYCIN PER 500 MG: Performed by: HOSPITALIST

## 2024-02-20 PROCEDURE — 80053 COMPREHEN METABOLIC PANEL: CPT | Performed by: HOSPITALIST

## 2024-02-20 PROCEDURE — 25010000002 CEFTRIAXONE PER 250 MG: Performed by: HOSPITALIST

## 2024-02-20 PROCEDURE — 25010000002 FUROSEMIDE PER 20 MG: Performed by: INTERNAL MEDICINE

## 2024-02-20 PROCEDURE — 25810000003 SODIUM CHLORIDE 0.9 % SOLUTION 250 ML FLEX CONT: Performed by: HOSPITALIST

## 2024-02-20 PROCEDURE — 94761 N-INVAS EAR/PLS OXIMETRY MLT: CPT

## 2024-02-20 PROCEDURE — 84484 ASSAY OF TROPONIN QUANT: CPT | Performed by: HOSPITALIST

## 2024-02-20 PROCEDURE — 83036 HEMOGLOBIN GLYCOSYLATED A1C: CPT | Performed by: HOSPITALIST

## 2024-02-20 RX ORDER — ATORVASTATIN CALCIUM 20 MG/1
10 TABLET, FILM COATED ORAL NIGHTLY
Status: DISCONTINUED | OUTPATIENT
Start: 2024-02-20 | End: 2024-02-21 | Stop reason: HOSPADM

## 2024-02-20 RX ADMIN — CLOPIDOGREL BISULFATE 75 MG: 75 TABLET, FILM COATED ORAL at 08:25

## 2024-02-20 RX ADMIN — GUAIFENESIN 600 MG: 600 TABLET, EXTENDED RELEASE ORAL at 08:25

## 2024-02-20 RX ADMIN — CARVEDILOL 3.12 MG: 6.25 TABLET, FILM COATED ORAL at 17:01

## 2024-02-20 RX ADMIN — ATORVASTATIN CALCIUM 10 MG: 20 TABLET, FILM COATED ORAL at 20:11

## 2024-02-20 RX ADMIN — ASPIRIN 81 MG: 81 TABLET, CHEWABLE ORAL at 08:26

## 2024-02-20 RX ADMIN — AZITHROMYCIN MONOHYDRATE 500 MG: 500 INJECTION, POWDER, LYOPHILIZED, FOR SOLUTION INTRAVENOUS at 14:25

## 2024-02-20 RX ADMIN — CEFTRIAXONE SODIUM 1000 MG: 1 INJECTION, POWDER, FOR SOLUTION INTRAMUSCULAR; INTRAVENOUS at 08:26

## 2024-02-20 RX ADMIN — GUAIFENESIN 600 MG: 600 TABLET, EXTENDED RELEASE ORAL at 20:12

## 2024-02-20 RX ADMIN — FUROSEMIDE 80 MG: 10 INJECTION, SOLUTION INTRAMUSCULAR; INTRAVENOUS at 20:11

## 2024-02-20 RX ADMIN — FUROSEMIDE 80 MG: 10 INJECTION, SOLUTION INTRAMUSCULAR; INTRAVENOUS at 08:25

## 2024-02-20 RX ADMIN — PANTOPRAZOLE SODIUM 40 MG: 40 TABLET, DELAYED RELEASE ORAL at 06:31

## 2024-02-20 RX ADMIN — ESCITALOPRAM OXALATE 10 MG: 10 TABLET, FILM COATED ORAL at 08:25

## 2024-02-20 RX ADMIN — PANTOPRAZOLE SODIUM 40 MG: 40 TABLET, DELAYED RELEASE ORAL at 17:01

## 2024-02-20 RX ADMIN — LISINOPRIL 2.5 MG: 2.5 TABLET ORAL at 08:25

## 2024-02-20 RX ADMIN — BUDESONIDE AND FORMOTEROL FUMARATE DIHYDRATE 2 PUFF: 160; 4.5 AEROSOL RESPIRATORY (INHALATION) at 22:01

## 2024-02-20 RX ADMIN — BUDESONIDE AND FORMOTEROL FUMARATE DIHYDRATE 2 PUFF: 160; 4.5 AEROSOL RESPIRATORY (INHALATION) at 09:15

## 2024-02-20 RX ADMIN — CARVEDILOL 3.12 MG: 6.25 TABLET, FILM COATED ORAL at 08:25

## 2024-02-20 NOTE — PROGRESS NOTES
"Daily progress note    Primary care physician  Dr. Bruno Ricci    Subjective  Awake and alert and feeling much better than yesterday with no new complaints.  Patient wants to take shower.    History of present illness  63-year-old white male with history of coronary artery disease morbidly obese obstructive sleep apnea hypertension and valvular heart disease presented to [facility] emergency room with increased shortness of breath 3 hours prior to the presentation at the hospital with no chest pain fever cough congestion night sweats weight loss or weight gain.  Patient workup in ER revealed acute hypoxic respiratory failure with pulmonary edema and also developing pneumonia admitted for management.  At the time of examination he is feeling better with diuretics and empiric antibiotics and answer all question appropriately.     REVIEW OF SYSTEMS  Constitutional:  Negative for chills and fever.   HENT:  Negative for ear pain and sore throat.    Respiratory:  Positive for shortness of breath. Negative for cough.    Cardiovascular:  Negative for chest pain and palpitations.   Gastrointestinal:  Negative for abdominal pain and vomiting.   Genitourinary:  Negative for dysuria and hematuria.   Musculoskeletal:  Negative for arthralgias and joint swelling.   Skin:  Negative for pallor and rash.   Neurological:  Negative for syncope and headaches.   Psychiatric/Behavioral:  Negative for confusion and hallucinations.       PHYSICAL EXAM   Blood pressure 129/77, pulse 88, temperature 98.1 °F (36.7 °C), temperature source Oral, resp. rate 18, height 182.9 cm (72\"), weight 125 kg (275 lb), SpO2 97%.    Constitutional:       General: He is not in acute distress.     Appearance: Normal appearance.   HENT:      Head: Normocephalic and atraumatic.      Nose: Nose normal.      Mouth/Throat:      Mouth: Mucous membranes are moist.   Eyes:      Conjunctiva/sclera: Conjunctivae normal.      Pupils: Pupils are equal, round, " and reactive to light.   Cardiovascular:      Rate and Rhythm: Regular rhythm. Tachycardia present.      Pulses: Normal pulses.      Heart sounds: Normal heart sounds.      Comments: Distal pulses intact  Pulmonary:      Effort: Tachypnea and respiratory distress present.      Breath sounds: Normal breath sounds.   Abdominal:      General: There is distension.   Musculoskeletal:         General: Normal range of motion.      Cervical back: Normal range of motion and neck supple.   Skin:     General: Skin is warm.      Capillary Refill: Capillary refill takes less than 2 seconds.   Neurological:      General: No focal deficit present.      Mental Status: He is alert and oriented to person, place, and time.   Psychiatric:         Mood and Affect: Mood normal.     LAB RESULTS  Lab Results (last 24 hours)       Procedure Component Value Units Date/Time    High Sensitivity Troponin T 2Hr [[id number]]  (Abnormal) Collected: 02/20/24 0724    Specimen: Blood Updated: 02/20/24 0834     HS Troponin T 298 ng/L      Troponin T Delta -38 ng/L     Narrative:      High Sensitive Troponin T Reference Range:  <14.0 ng/L- Negative Female for AMI  <22.0 ng/L- Negative Male for AMI  >=14 - Abnormal Female indicating possible myocardial injury.  >=22 - Abnormal Male indicating possible myocardial injury.   Clinicians would have to utilize clinical acumen, EKG, Troponin, and serial changes to determine if it is an Acute Myocardial Infarction or myocardial injury due to an underlying chronic condition.         Blood Culture - Blood, Arm, Left [[id number]]  (Normal) Collected: 02/19/24 0650    Specimen: Blood from Arm, Left Updated: 02/20/24 0747     Blood Culture No growth at 24 hours    Blood Culture - Blood, Hand, Right [[id number]]  (Normal) Collected: 02/19/24 0638    Specimen: Blood from Hand, Right Updated: 02/20/24 0702     Blood Culture No growth at 24 hours    High Sensitivity Troponin T [[id number]]  (Abnormal) Collected: 02/20/24  0355    Specimen: Blood from Arm, Right Updated: 02/20/24 0555     HS Troponin T 336 ng/L     Narrative:      High Sensitive Troponin T Reference Range:  <14.0 ng/L- Negative Female for AMI  <22.0 ng/L- Negative Male for AMI  >=14 - Abnormal Female indicating possible myocardial injury.  >=22 - Abnormal Male indicating possible myocardial injury.   Clinicians would have to utilize clinical acumen, EKG, Troponin, and serial changes to determine if it is an Acute Myocardial Infarction or myocardial injury due to an underlying chronic condition.         Manual Differential [[id number]]  (Abnormal) Collected: 02/20/24 0355    Specimen: Blood from Arm, Right Updated: 02/20/24 0536     Neutrophil % 69.4 %      Lymphocyte % 25.5 %      Monocyte % 5.1 %      Neutrophils Absolute 12.26 10*3/mm3      Lymphocytes Absolute 4.50 10*3/mm3      Monocytes Absolute 0.90 10*3/mm3      RBC Morphology Normal     Smudge Cells Mod/2+     Platelet Morphology Normal    Comprehensive Metabolic Panel [[id number]]  (Abnormal) Collected: 02/20/24 0355    Specimen: Blood from Arm, Right Updated: 02/20/24 0524     Glucose 115 mg/dL      BUN 15 mg/dL      Creatinine 0.94 mg/dL      Sodium 137 mmol/L      Potassium 3.7 mmol/L      Chloride 99 mmol/L      CO2 26.4 mmol/L      Calcium 8.5 mg/dL      Total Protein 6.5 g/dL      Albumin 3.9 g/dL      ALT (SGPT) 20 U/L      AST (SGOT) 35 U/L      Alkaline Phosphatase 98 U/L      Total Bilirubin 0.7 mg/dL      Globulin 2.6 gm/dL      A/G Ratio 1.5 g/dL      BUN/Creatinine Ratio 16.0     Anion Gap 11.6 mmol/L      eGFR 90.0 mL/min/1.73     Narrative:      GFR Normal >60  Chronic Kidney Disease <60  Kidney Failure <15      TSH [[id number]]  (Normal) Collected: 02/20/24 0355    Specimen: Blood from Arm, Right Updated: 02/20/24 0508     TSH 0.528 uIU/mL     Lipid Panel [[id number]]  (Abnormal) Collected: 02/20/24 0355    Specimen: Blood from Arm, Right Updated: 02/20/24 0502     Total Cholesterol 103 mg/dL       Triglycerides 119 mg/dL      HDL Cholesterol 38 mg/dL      LDL Cholesterol  43 mg/dL      VLDL Cholesterol 22 mg/dL      LDL/HDL Ratio 1.08    Narrative:      Cholesterol Reference Ranges  (U.S. Department of Health and Human Services ATP III Classifications)    Desirable          <200 mg/dL  Borderline High    200-239 mg/dL  High Risk          >240 mg/dL      Triglyceride Reference Ranges  (U.S. Department of Health and Human Services ATP III Classifications)    Normal           <150 mg/dL  Borderline High  150-199 mg/dL  High             200-499 mg/dL  Very High        >500 mg/dL    HDL Reference Ranges  (U.S. Department of Health and Human Services ATP III Classifications)    Low     <40 mg/dl (major risk factor for CHD)  High    >60 mg/dl ('negative' risk factor for CHD)        LDL Reference Ranges  (U.S. Department of Health and Human Services ATP III Classifications)    Optimal          <100 mg/dL  Near Optimal     100-129 mg/dL  Borderline High  130-159 mg/dL  High             160-189 mg/dL  Very High        >189 mg/dL    CBC & Differential [[id number]]  (Abnormal) Collected: 02/20/24 0355    Specimen: Blood from Arm, Right Updated: 02/20/24 0457    Narrative:      The following orders were created for panel order CBC & Differential.  Procedure                               Abnormality         Status                     ---------                               -----------         ------                     CBC Auto Differential[[id number]]        Abnormal            Final result                 Please view results for these tests on the individual orders.    CBC Auto Differential [[id number]]  (Abnormal) Collected: 02/20/24 0355    Specimen: Blood from Arm, Right Updated: 02/20/24 0457     WBC 17.66 10*3/mm3      RBC 3.89 10*6/mm3      Hemoglobin 11.6 g/dL      Hematocrit 35.3 %      MCV 90.7 fL      MCH 29.8 pg      MCHC 32.9 g/dL      RDW 12.4 %      RDW-SD 40.3 fl      MPV 11.1 fL      Platelets 179 10*3/mm3      Hemoglobin A1c [[id number]]  (Normal) Collected: 02/20/24 0355    Specimen: Blood from Arm, Right Updated: 02/20/24 0456     Hemoglobin A1C 5.60 %     Narrative:      Hemoglobin A1C Ranges:    Increased Risk for Diabetes  5.7% to 6.4%  Diabetes                     >= 6.5%  Diabetic Goal                < 7.0%    STAT Lactic Acid, Reflex [[id number]]  (Normal) Collected: 02/20/24 0039    Specimen: Blood Updated: 02/20/24 0122     Lactate 1.7 mmol/L     STAT Lactic Acid, Reflex [[id number]]  (Abnormal) Collected: 02/19/24 1850    Specimen: Blood Updated: 02/19/24 1931     Lactate 2.1 mmol/L           Imaging Results (Last 24 Hours)       Procedure Component Value Units Date/Time    XR Chest 1 View [[id number]] Collected: 02/20/24 1237     Updated: 02/20/24 1242    Narrative:      XR CHEST 1 VW-     HISTORY: Male who is 63 years-old, pneumonia versus edema, follow-up     TECHNIQUE: Frontal view of the chest     COMPARISON: 2/19/2024     FINDINGS: The heart size is borderline with mild, decreased pulmonary  vascular congestion. Sternotomy wires are noted. Previous bilateral  pulmonary opacities have improved. No large pleural effusion, or  pneumothorax. No acute osseous process.       Impression:      Interval improvement.     This report was finalized on 2/20/2024 12:39 PM by Dr. Spencer Major M.D on Workstation: [id number]             Results  Scan on 2/19/2024 0622 by [facility], [place]: ECG 12-LEAD         Author: -- Service: -- Author Type: --   Filed: Date of Service: Creation Time:   Status: (Other)   HEART RATE= 100  bpm  RR Interval= 600  ms  SD Interval= 144  ms  P Horizontal Axis= -29  deg  P Front Axis= 49  deg  QRSD Interval= 119  ms  QT Interval= 361  ms  QTcB= 466  ms  QRS Axis= -49  deg  T Wave Axis= 20  deg  - ABNORMAL ECG -  Sinus tachycardia  Probable left atrial enlargement  Incomplete right bundle branch block  Minimal ST elevation, lateral leads          Current Facility-Administered Medications:      aspirin chewable tablet 81 mg, 81 mg, Oral, Daily, Jason Marie MD, 81 mg at 02/20/24 0826    atorvastatin (LIPITOR) tablet 40 mg, 40 mg, Oral, Nightly, Jason Marie MD, 40 mg at 02/19/24 2010    azithromycin (ZITHROMAX) 500 mg in sodium chloride 0.9 % 250 mL IVPB-VTB, 500 mg, Intravenous, Q24H, Jason Marie MD, 500 mg at 02/20/24 1425    budesonide-formoterol (SYMBICORT) 160-4.5 MCG/ACT inhaler 2 puff, 2 puff, Inhalation, BID - RT, [place], Denny Ramirez MD, 2 puff at 02/20/24 0915    carvedilol (COREG) tablet 3.125 mg, 3.125 mg, Oral, BID With Meals, Tate Viveros MD, 3.125 mg at 02/20/24 0825    cefTRIAXone (ROCEPHIN) 1,000 mg in sodium chloride 0.9 % 100 mL IVPB-VTB, 1,000 mg, Intravenous, Daily, Jason Marie MD, Last Rate: 200 mL/hr at 02/20/24 0826, 1,000 mg at 02/20/24 0826    clopidogrel (PLAVIX) tablet 75 mg, 75 mg, Oral, Daily, Jason Marie MD, 75 mg at 02/20/24 0825    escitalopram (LEXAPRO) tablet 10 mg, 10 mg, Oral, Daily, Jason Marie MD, 10 mg at 02/20/24 0825    furosemide (LASIX) injection 80 mg, 80 mg, Intravenous, Q12H, Tate Viveros MD, 80 mg at 02/20/24 0825    guaiFENesin (MUCINEX) 12 hr tablet 600 mg, 600 mg, Oral, Q12H, Jason Marie MD, 600 mg at 02/20/24 0825    Influenza Vac High-Dose Quad (FLUZONE HIGH DOSE) injection 0.7 mL, 0.7 mL, Intramuscular, During Hospitalization, Jason Marie MD    ipratropium-albuterol (DUO-NEB) nebulizer solution 3 mL, 3 mL, Nebulization, Q4H PRN, Denny Garza MD    lisinopril (PRINIVIL,ZESTRIL) tablet 2.5 mg, 2.5 mg, Oral, Q24H, Jason Marie MD, 2.5 mg at 02/20/24 0825    pantoprazole (PROTONIX) EC tablet 40 mg, 40 mg, Oral, BID AC, Jason Marie MD, 40 mg at 02/20/24 0631    sodium chloride 0.9 % flush 10 mL, 10 mL, Intravenous, PRN, Jonathan Kennedy MD     ASSESSMENT  Acute hypoxic respiratory failure  Acute pulmonary edema  Elevated troponin  Bilateral lower lobe pneumonia with sepsis  Acute history bronchitis  Morbidly  obese  Obstructive sleep apnea  Coronary artery disease  COPD  Hypertension  Gastroesophageal reflux disease    PLAN  CPM  Supplemental oxygen nebulizer  BiPAP as needed  Continue IV diuresis  Continue IV antibiotics   Symptomatic treatment for RSV bronchitis  Serial cardiac enzymes and EKG  Cardiology consult appreciated  Pulmonary to follow patient  Adjust home medications  Stress ulcer DVT prophylaxis  Supportive care  Discussed with nursing staff  Follow closely further recommendation current hospital course    KAYLI DONG MD    Copied text in this note has been reviewed and is accurate as of 02/20/24

## 2024-02-20 NOTE — CASE MANAGEMENT/SOCIAL WORK
Discharge Planning Assessment  [facility]     Patient Name: Harvinder Vega  MRN: [id number]  Today's Date: 2/20/2024    Admit Date: 2/19/2024    Plan: Discharge home   Discharge Needs Assessment       Row Name 02/20/24 1529       Living Environment    People in Home alone    Current Living Arrangements home    Primary Care Provided by self    Provides Primary Care For no one    Family Caregiver if Needed significant other;child(chrystal), adult    Able to Return to Prior Arrangements yes       Resource/Environmental Concerns    Transportation Concerns none       Food Insecurity    Within the past 12 months, you worried that your food would run out before you got the money to buy more. Never true    Within the past 12 months, the food you bought just didn't last and you didn't have money to get more. Never true       Transition Planning    Patient/Family Anticipates Transition to home    Transportation Anticipated family or friend will provide       Discharge Needs Assessment    Equipment Currently Used at Home other (see comments)  Has walker/cane status post knee replacement three months ago    Concerns to be Addressed discharge planning                   Discharge Plan       Row Name 02/20/24 1660       Plan    Plan Discharge home    Patient/Family in Agreement with Plan yes    Plan Comments CCP spoke with patient at bedside; verified facesheet; patient lives alone; DME - walker/cane; no history of HH/SNF. Three steps to enter home; patient uses first floor only of two-level home. Plan - Discharge home. CCP following for discharge needs. Son or significant other to transport home. RM, CSW                  Continued Care and Services - Admitted Since 2/19/2024    Coordination has not been started for this encounter.       Expected Discharge Date and Time       Expected Discharge Date Expected Discharge Time    Feb 22, 2024            Demographic Summary       Row Name 02/20/24 1528       General Information     Admission Type inpatient    Arrived From home    Referral Source admission list    Reason for Consult discharge planning    Preferred Language English                   Functional Status       Row Name 02/20/24 1528       Functional Status    Usual Activity Tolerance good    Current Activity Tolerance good       Functional Status, IADL    Medications independent    Meal Preparation independent    Housekeeping independent    Laundry independent    Shopping independent       Mental Status    General Appearance WDL WDL                   Psychosocial    No documentation.                  Abuse/Neglect    No documentation.                  Legal    No documentation.                  Substance Abuse    No documentation.                  Patient Forms    No documentation.                     Sherine Claros

## 2024-02-20 NOTE — PLAN OF CARE
Problem: Adult Inpatient Plan of Care  Goal: Plan of Care Review  Outcome: Ongoing, Progressing  Flowsheets (Taken 2/20/2024 0449)  Progress: improving  Plan of Care Reviewed With: patient  Goal: Patient-Specific Goal (Individualized)  Outcome: Ongoing, Progressing  Goal: Absence of Hospital-Acquired Illness or Injury  Outcome: Ongoing, Progressing  Intervention: Identify and Manage Fall Risk  Recent Flowsheet Documentation  Taken 2/20/2024 0400 by Latia Schaffer RN  Safety Promotion/Fall Prevention:   activity supervised   clutter free environment maintained   fall prevention program maintained   lighting adjusted   nonskid shoes/slippers when out of bed   room organization consistent   safety round/check completed  Taken 2/20/2024 0200 by Latia Schaffer RN  Safety Promotion/Fall Prevention:   activity supervised   clutter free environment maintained   fall prevention program maintained   lighting adjusted   nonskid shoes/slippers when out of bed   room organization consistent   safety round/check completed  Taken 2/20/2024 0000 by Latia Schaffer RN  Safety Promotion/Fall Prevention:   safety round/check completed   activity supervised   clutter free environment maintained   fall prevention program maintained   lighting adjusted   nonskid shoes/slippers when out of bed   room organization consistent  Taken 2/19/2024 2200 by Latia Schaffer RN  Safety Promotion/Fall Prevention:   activity supervised   clutter free environment maintained   fall prevention program maintained   lighting adjusted   nonskid shoes/slippers when out of bed   room organization consistent   safety round/check completed  Taken 2/19/2024 2000 by Latia Schaffer RN  Safety Promotion/Fall Prevention: safety round/check completed  Intervention: Prevent Skin Injury  Recent Flowsheet Documentation  Taken 2/20/2024 0400 by Latia Schaffer RN  Body Position: position changed independently  Taken 2/20/2024 0200 by Latia Schaffer  RN  Body Position: position changed independently  Taken 2/20/2024 0000 by Latia Schaffer RN  Body Position: position changed independently  Taken 2/19/2024 2200 by Latia Schaffer RN  Body Position: position changed independently  Taken 2/19/2024 2000 by Latia Schaffer RN  Body Position: position changed independently  Intervention: Prevent and Manage VTE (Venous Thromboembolism) Risk  Recent Flowsheet Documentation  Taken 2/20/2024 0400 by Latia Schaffer RN  Activity Management: activity encouraged  Taken 2/20/2024 0200 by Latia Schaffer RN  Activity Management: activity encouraged  Taken 2/19/2024 2000 by Latia Schaffer RN  Activity Management: activity encouraged  Intervention: Prevent Infection  Recent Flowsheet Documentation  Taken 2/20/2024 0400 by Latia Schaffer RN  Infection Prevention:   rest/sleep promoted   single patient room provided  Taken 2/20/2024 0200 by Latia Schaffer RN  Infection Prevention:   rest/sleep promoted   single patient room provided  Taken 2/20/2024 0000 by Latia Schaffer RN  Infection Prevention:   rest/sleep promoted   single patient room provided  Taken 2/19/2024 2200 by Latia Schaffer RN  Infection Prevention:   rest/sleep promoted   single patient room provided  Goal: Optimal Comfort and Wellbeing  Outcome: Ongoing, Progressing  Intervention: Provide Person-Centered Care  Recent Flowsheet Documentation  Taken 2/19/2024 2000 by Latia Schaffer RN  Trust Relationship/Rapport:   care explained   choices provided  Goal: Readiness for Transition of Care  Outcome: Ongoing, Progressing     Problem: Heart Failure Comorbidity  Goal: Maintenance of Heart Failure Symptom Control  Outcome: Ongoing, Progressing  Intervention: Maintain Heart Failure-Management  Recent Flowsheet Documentation  Taken 2/20/2024 0400 by Latia Schaffer RN  Medication Review/Management: medications reviewed  Taken 2/20/2024 0200 by Latia Schaffer RN  Medication Review/Management:  medications reviewed  Taken 2/20/2024 0000 by Latia Schaffer, RN  Medication Review/Management: medications reviewed  Taken 2/19/2024 2200 by Latia Schaffer, RN  Medication Review/Management: medications reviewed   Goal Outcome Evaluation:  Plan of Care Reviewed With: patient        Progress: improving

## 2024-02-20 NOTE — PROGRESS NOTES
[facility] INPATIENT PROGRESS NOTE         [address]    2024      PATIENT IDENTIFICATION:  Name: Harvinder Vega ADMIT: 2024   : 1960  PCP: Bruno Ricci MD    MRN: [id number] LOS: 1 days   AGE/SEX: 63 y.o. male  ROOM: [facility]                     LOS 1    Reason for visit: respiratory failure       SUBJECTIVE:      Less soa.  No cp, n/v    Objective   OBJECTIVE:    Vital Sign Min/Max for last 24 hours  Temp  Min: 98.2 °F (36.8 °C)  Max: 99.3 °F (37.4 °C)   BP  Min: 95/74  Max: 150/75   Pulse  Min: 73  Max: 102   Resp  Min: 18  Max: 18   SpO2  Min: 92 %  Max: 100 %   No data recorded   Weight  Min: 125 kg (275 lb)  Max: 125 kg (275 lb)    Vitals:    24 1940 24 1944 24 2312 24 0655   BP:   134/69 119/79   BP Location:   Right arm Right arm   Patient Position:   Lying Lying   Pulse: 100 102 90    Resp: 18  18 18   Temp:   98.8 °F (37.1 °C) 98.4 °F (36.9 °C)   TempSrc:   Oral Oral   SpO2: 96% 93% 92%    Weight:       Height:                24  0612 24  1101   Weight: 125 kg (275 lb) 125 kg (275 lb)       Body mass index is 37.3 kg/m².                          Body mass index is 37.3 kg/m².    Intake/Output Summary (Last 24 hours) at 2024 0809  Last data filed at 2024 1200  Gross per 24 hour   Intake 240 ml   Output 125 ml   Net 115 ml         Exam:  GEN:  No distress, appears stated age  EYES:   PERRL, anicteric sclerae  ENT:    External ears/nose normal, OP clear  NECK:  No adenopathy, midline trachea  LUNGS: Normal chest on inspection, palpation and auscultation  CV:  Normal S1S2, without murmur  ABD:  Nontender, nondistended, no hepatosplenomegaly, +BS  EXT:  No cyanosis or clubbing.  No mottling and normal cap refill.    Assessment     Scheduled meds:  aspirin, 81 mg, Oral, Daily  atorvastatin, 40 mg, Oral, Nightly  azithromycin, 500 mg, Intravenous, Q24H  budesonide-formoterol, 2 puff, Inhalation, BID - RT  carvedilol, 3.125 mg,  Oral, BID With Meals  cefTRIAXone, 1,000 mg, Intravenous, Daily  clopidogrel, 75 mg, Oral, Daily  escitalopram, 10 mg, Oral, Daily  furosemide, 80 mg, Intravenous, Q12H  guaiFENesin, 600 mg, Oral, Q12H  lisinopril, 2.5 mg, Oral, Q24H  pantoprazole, 40 mg, Oral, BID AC      IV meds:                         Data Review:  Results from last 7 days   Lab Units 02/20/24  0355 02/19/24  0618 02/19/24  0618 02/19/24  0616   SODIUM mmol/L 137  --  137  --    POTASSIUM mmol/L 3.7  --  5.1  --    CHLORIDE mmol/L 99  --  99  --    CO2 mmol/L 26.4  --  22.0  --    BUN mg/dL 15  --  10  --    CREATININE mg/dL 0.94  --  1.05 0.82   GLUCOSE mg/dL 115*   < > 274*  --    CALCIUM mg/dL 8.5*  --  8.7  --     < > = values in this interval not displayed.         Estimated Creatinine Clearance: 107 mL/min (by C-G formula based on SCr of 0.94 mg/dL).  Results from last 7 days   Lab Units 02/20/24  0355 02/19/24  0618 02/19/24  0616   WBC 10*3/mm3 17.66* 22.18*  --    HEMOGLOBIN g/dL 11.6* 13.8  --    HEMOGLOBIN, POC g/dL  --   --  15.5   PLATELETS 10*3/mm3 179 197  --      Results from last 7 days   Lab Units 02/19/24  0618   INR  1.06     Results from last 7 days   Lab Units 02/20/24  0355 02/19/24  0618   ALT (SGPT) U/L 20 27   AST (SGOT) U/L 35 34     Results from last 7 days   Lab Units 02/19/24  0710 02/19/24  0616   PH, ARTERIAL pH units 7.311* 7.285*   PO2 ART mm Hg 157.6* 51.3*   PCO2, ARTERIAL mm Hg 53.8* 50.8*   HCO3 ART mmol/L 27.2 24.2     Results from last 7 days   Lab Units 02/20/24  0039 02/19/24  1850 02/19/24  1303 02/19/24  1003 02/19/24  0650 02/19/24  0618 02/19/24  0616   PROCALCITONIN ng/mL  --   --   --   --   --  <0.02  --    LACTATE mmol/L 1.7 2.1* 2.6* 2.1* 4.4*  --  4.2*         Hemoglobin A1C   Date/Time Value Ref Range Status   02/20/2024 0355 5.60 4.80 - 5.60 % Final     Glucose   Date/Time Value Ref Range Status   02/19/2024 0616 297 (H) 70 - 130 mg/dL Final     Comment:     Serial Number: [id number]:   [id number]         Imaging reviewed  2D echo reviewed    CXR/CT chest 2/19 reviewed    Microbiology reviewed            Active Hospital Problems    Diagnosis  POA    **Acute hypoxic respiratory failure [J96.01]  Yes      Resolved Hospital Problems   No resolved problems to display.         ASSESSMENT:  Acute hypoxemic and hypercapnic respiratory failure  Acute CHF with pulmonary edema: EF 40%  COPD  Acute RSV  Lactic acidosis  History of tobacco use: 45-pack-year history quit 2020  Leukocytosis and potential infiltrates with pneumonia versus all edema  CAD with history of CABG      PLAN:  Continue antibiotics for potential multifocal pneumonia but I favor this to be predominantly pulmonary edema from CHF and viral.  He is recieving Lasix and oxygenation is improving.  Able to come off NIPPV and acidosis improved.  Wean oxygen for goal saturation greater than 90%. Repeat CXR.  Treatment for viral process is supportive.    Bronchodilators as needed for COPD symptoms.    Denny Garza MD  Pulmonary and Critical Care Medicine  [facility], [facility]  2/20/2024    08:09 EST

## 2024-02-20 NOTE — PROGRESS NOTES
"Patient Care Team:  Bruno Ricci MD as PCP - General (Internal Medicine)    Chief Complaint: Follow-up acute on chronic heart failure with reduced ejection fraction.    Interval History:   Denies chest pain.  Blood pressure stable.    Objective   Vital Signs  Temp:  [98.1 °F (36.7 °C)-99.3 °F (37.4 °C)] 98.1 °F (36.7 °C)  Heart Rate:  [] 88  Resp:  [18] 18  BP: (119-150)/(69-79) 129/77    Intake/Output Summary (Last 24 hours) at 2/20/2024 1629  Last data filed at 2/20/2024 1000  Gross per 24 hour   Intake --   Output 800 ml   Net -800 ml     Flowsheet Rows      Flowsheet Row First Filed Value   Admission Height 182.9 cm (72\") Documented at 02/19/2024 0614   Admission Weight 125 kg (275 lb) Documented at 02/19/2024 0612            Temp:  [98.1 °F (36.7 °C)-99.3 °F (37.4 °C)] 98.1 °F (36.7 °C)  Heart Rate:  [] 88  Resp:  [18] 18  BP: (119-150)/(69-79) 129/77    Intake/Output Summary (Last 24 hours) at 2/20/2024 1629  Last data filed at 2/20/2024 1000  Gross per 24 hour   Intake --   Output 800 ml   Net -800 ml     Flowsheet Rows      Flowsheet Row First Filed Value   Admission Height 182.9 cm (72\") Documented at 02/19/2024 0614   Admission Weight 125 kg (275 lb) Documented at 02/19/2024 0612            General Appearance:    Alert, cooperative, in no acute distress   Head:    Normocephalic, without obvious abnormality, atraumatic       Neck/Lymph   No adenopathy, supple, no thyromegaly, no carotid bruit, no    JVD   Lungs:     Clear to auscultation bilaterally, no wheezes, rales, or     rhonchi    Cardiac:    Normal rate, regular rhythm, no murmur, no rub, no gallop   Chest Wall:    No abnormalities observed   GI:     Normal bowel sounds, soft, nontender, nondistended,            no rebound tenderness   Extremities:   No cyanosis, clubbing, or edema   Circulatory/Peripheral Vascular :   Pulses palpable and equal bilaterally   Integumentary:   No bleeding or rash. Normal temperature         aspirin, 81 " mg, Oral, Daily  atorvastatin, 10 mg, Oral, Nightly  azithromycin, 500 mg, Intravenous, Q24H  budesonide-formoterol, 2 puff, Inhalation, BID - RT  carvedilol, 3.125 mg, Oral, BID With Meals  cefTRIAXone, 1,000 mg, Intravenous, Daily  clopidogrel, 75 mg, Oral, Daily  escitalopram, 10 mg, Oral, Daily  furosemide, 80 mg, Intravenous, Q12H  guaiFENesin, 600 mg, Oral, Q12H  lisinopril, 2.5 mg, Oral, Q24H  pantoprazole, 40 mg, Oral, BID AC             Results Review:    Results from last 7 days   Lab Units 02/20/24  0355   SODIUM mmol/L 137   POTASSIUM mmol/L 3.7   CHLORIDE mmol/L 99   CO2 mmol/L 26.4   BUN mg/dL 15   CREATININE mg/dL 0.94   GLUCOSE mg/dL 115*   CALCIUM mg/dL 8.5*     Results from last 7 days   Lab Units 02/20/24  0724 02/20/24  0355 02/19/24  0618   HSTROP T ng/L 298* 336* 30*     Results from last 7 days   Lab Units 02/20/24  0355   WBC 10*3/mm3 17.66*   HEMOGLOBIN g/dL 11.6*   HEMATOCRIT % 35.3*   PLATELETS 10*3/mm3 179     Results from last 7 days   Lab Units 02/19/24  0618   INR  1.06   APTT seconds 23.9     Results from last 7 days   Lab Units 02/20/24  0355   CHOLESTEROL mg/dL 103     Results from last 7 days   Lab Units 02/19/24  0618   MAGNESIUM mg/dL 1.8     Results from last 7 days   Lab Units 02/20/24  0355   CHOLESTEROL mg/dL 103   TRIGLYCERIDES mg/dL 119   HDL CHOL mg/dL 38*   LDL CHOL mg/dL 43     @LABRCNT(bnp)@  I reviewed the patient's new clinical results.  I personally viewed and interpreted the patient's EKG/Telemetry data         Left ventricular systolic function is mildly decreased. Estimated left ventricular EF = 40%    The left ventricular cavity is mildly dilated.    Left ventricular wall thickness is consistent with mild concentric hypertrophy.    The following left ventricular wall segments are dyskinetic: basal anterior and mid anterior. The following left ventricular wall segments are akinetic: basal anterolateral, mid anterolateral and mid anteroseptal.    Left ventricular  diastolic function is consistent with (grade I) impaired relaxation.    Mildly reduced right ventricular systolic function noted.    There is calcification of the aortic valve.       Assessment & Plan   1.  Acute heart failure with reduced ejection fraction  2.  Acute hypoxic respiratory failure  3.  Lactic acidosis  4.  Leukocytosis: Procalcitonin normal.  Likely stress response.  5.  Ischemic cardiomyopathy: Last echo was in 2021 with an ejection fraction of about 45 to 50%.  6.  Coronary disease with prior CABG: No chest pain.   7.  Non-ST elevation MI: Type II     -Patient presented with what seems to be acute heart failure with reduced ejection fraction in the setting of severe hypertension.  Blood pressure is fine currently after diuresis and BiPAP.  -In the setting of lack of infectious symptoms and a normal procalcitonin I think it is unlikely that he has a pneumonia.  - Recommend continuing IV Lasix at 80 mg IV every 12 hours today.  Renal function stable.  - Echo with stable ejection fraction and wall motion abnormalities.  I do not see a significant change there.

## 2024-02-20 NOTE — PROGRESS NOTES
"Nutrition Services    Patient Name:  Harvinder Vega  YOB: 1960  MRN: [id number]  Admit Date:  2/19/2024  Assessment Date:  02/20/24    NUTRITION SCREENING      Reason for Encounter MST score 2+ Age   Diagnosis/Problem Respiratory failure, CHF, COPD, RSV       PO Diet Diet: Regular/House Diet; Texture: Regular Texture (IDDSI 7); Fluid Consistency: Thin (IDDSI 0)   Supplements    PO Intake % 100%       Medications MAR reviewed by RD   Labs  Listed below, reviewed   Physical Findings BMI >30   GI Function normal   Skin Status intact       Height  Weight  BMI  Weight Trend     Height: 182.9 cm (72\")  Weight: 125 kg (275 lb) (02/19/24 1101)  Body mass index is 37.3 kg/m².  Stable       Nutrition Problem (PES) No nutrition diagnosis at this time.       Intervention/Plan RD to follow up per protocol.     Results from last 7 days   Lab Units 02/20/24  0355 02/19/24  0618 02/19/24  0616   SODIUM mmol/L 137 137  --    POTASSIUM mmol/L 3.7 5.1  --    CHLORIDE mmol/L 99 99  --    CO2 mmol/L 26.4 22.0  --    BUN mg/dL 15 10  --    CREATININE mg/dL 0.94 1.05 0.82   CALCIUM mg/dL 8.5* 8.7  --    BILIRUBIN mg/dL 0.7 0.4  --    ALK PHOS U/L 98 122*  --    ALT (SGPT) U/L 20 27  --    AST (SGOT) U/L 35 34  --    GLUCOSE mg/dL 115* 274*  --      Results from last 7 days   Lab Units 02/20/24  0355 02/19/24  0618   MAGNESIUM mg/dL  --  1.8   HEMOGLOBIN g/dL 11.6* 13.8   HEMATOCRIT % 35.3* 42.4   TRIGLYCERIDES mg/dL 119  --      Lab Results   Component Value Date    HGBA1C 5.60 02/20/2024         Electronically signed by:  Martha Liu RD  02/20/24 13:39 EST  "

## 2024-02-21 VITALS
WEIGHT: 275 LBS | HEART RATE: 73 BPM | BODY MASS INDEX: 37.25 KG/M2 | RESPIRATION RATE: 18 BRPM | SYSTOLIC BLOOD PRESSURE: 110 MMHG | DIASTOLIC BLOOD PRESSURE: 75 MMHG | HEIGHT: 72 IN | TEMPERATURE: 97.9 F | OXYGEN SATURATION: 98 %

## 2024-02-21 LAB
ANION GAP SERPL CALCULATED.3IONS-SCNC: 14 MMOL/L (ref 5–15)
BASOPHILS # BLD MANUAL: 0.31 10*3/MM3 (ref 0–0.2)
BASOPHILS NFR BLD MANUAL: 2.1 % (ref 0–1.5)
BUN SERPL-MCNC: 21 MG/DL (ref 8–23)
BUN/CREAT SERPL: 21.4 (ref 7–25)
CALCIUM SPEC-SCNC: 8.9 MG/DL (ref 8.6–10.5)
CHLORIDE SERPL-SCNC: 99 MMOL/L (ref 98–107)
CO2 SERPL-SCNC: 24 MMOL/L (ref 22–29)
CREAT SERPL-MCNC: 0.98 MG/DL (ref 0.76–1.27)
DEPRECATED RDW RBC AUTO: 41.6 FL (ref 37–54)
EGFRCR SERPLBLD CKD-EPI 2021: 85.6 ML/MIN/1.73
ERYTHROCYTE [DISTWIDTH] IN BLOOD BY AUTOMATED COUNT: 12.5 % (ref 12.3–15.4)
GLUCOSE SERPL-MCNC: 102 MG/DL (ref 65–99)
HCT VFR BLD AUTO: 39 % (ref 37.5–51)
HGB BLD-MCNC: 12.9 G/DL (ref 13–17.7)
LYMPHOCYTES # BLD MANUAL: 4.36 10*3/MM3 (ref 0.7–3.1)
LYMPHOCYTES NFR BLD MANUAL: 14.9 % (ref 5–12)
MCH RBC QN AUTO: 30.6 PG (ref 26.6–33)
MCHC RBC AUTO-ENTMCNC: 33.1 G/DL (ref 31.5–35.7)
MCV RBC AUTO: 92.4 FL (ref 79–97)
MONOCYTES # BLD: 2.18 10*3/MM3 (ref 0.1–0.9)
NEUTROPHILS # BLD AUTO: 7.78 10*3/MM3 (ref 1.7–7)
NEUTROPHILS NFR BLD MANUAL: 53.2 % (ref 42.7–76)
PLAT MORPH BLD: NORMAL
PLATELET # BLD AUTO: 194 10*3/MM3 (ref 140–450)
PMV BLD AUTO: 11.2 FL (ref 6–12)
POTASSIUM SERPL-SCNC: 3.5 MMOL/L (ref 3.5–5.2)
RBC # BLD AUTO: 4.22 10*6/MM3 (ref 4.14–5.8)
RBC MORPH BLD: NORMAL
SMUDGE CELLS BLD QL SMEAR: ABNORMAL
SODIUM SERPL-SCNC: 137 MMOL/L (ref 136–145)
TROPONIN T SERPL HS-MCNC: 210 NG/L
VARIANT LYMPHS NFR BLD MANUAL: 29.8 % (ref 19.6–45.3)
WBC NRBC COR # BLD AUTO: 14.62 10*3/MM3 (ref 3.4–10.8)

## 2024-02-21 PROCEDURE — 99232 SBSQ HOSP IP/OBS MODERATE 35: CPT | Performed by: NURSE PRACTITIONER

## 2024-02-21 PROCEDURE — 94761 N-INVAS EAR/PLS OXIMETRY MLT: CPT

## 2024-02-21 PROCEDURE — 25010000002 FUROSEMIDE PER 20 MG: Performed by: INTERNAL MEDICINE

## 2024-02-21 PROCEDURE — 25010000002 CEFTRIAXONE PER 250 MG: Performed by: HOSPITALIST

## 2024-02-21 PROCEDURE — 94799 UNLISTED PULMONARY SVC/PX: CPT

## 2024-02-21 PROCEDURE — 84484 ASSAY OF TROPONIN QUANT: CPT | Performed by: HOSPITALIST

## 2024-02-21 PROCEDURE — 85025 COMPLETE CBC W/AUTO DIFF WBC: CPT | Performed by: HOSPITALIST

## 2024-02-21 PROCEDURE — 85007 BL SMEAR W/DIFF WBC COUNT: CPT | Performed by: HOSPITALIST

## 2024-02-21 PROCEDURE — 80048 BASIC METABOLIC PNL TOTAL CA: CPT | Performed by: HOSPITALIST

## 2024-02-21 PROCEDURE — 94664 DEMO&/EVAL PT USE INHALER: CPT

## 2024-02-21 RX ORDER — GUAIFENESIN 600 MG/1
600 TABLET, EXTENDED RELEASE ORAL EVERY 12 HOURS SCHEDULED
Qty: 60 TABLET | Refills: 0 | Status: SHIPPED | OUTPATIENT
Start: 2024-02-21 | End: 2024-03-22

## 2024-02-21 RX ORDER — FUROSEMIDE 40 MG/1
40 TABLET ORAL DAILY
Qty: 90 TABLET | Refills: 1 | Status: SHIPPED | OUTPATIENT
Start: 2024-02-23 | End: 2024-02-21

## 2024-02-21 RX ORDER — CEFDINIR 300 MG/1
300 CAPSULE ORAL EVERY 12 HOURS SCHEDULED
Status: DISCONTINUED | OUTPATIENT
Start: 2024-02-21 | End: 2024-02-21 | Stop reason: HOSPADM

## 2024-02-21 RX ORDER — ASPIRIN 81 MG/1
81 TABLET, CHEWABLE ORAL DAILY
Qty: 30 TABLET | Refills: 0 | Status: SHIPPED | OUTPATIENT
Start: 2024-02-22 | End: 2024-03-23

## 2024-02-21 RX ORDER — ATORVASTATIN CALCIUM 10 MG/1
10 TABLET, FILM COATED ORAL NIGHTLY
Qty: 30 TABLET | Refills: 0 | Status: SHIPPED | OUTPATIENT
Start: 2024-02-21 | End: 2024-03-07 | Stop reason: SDUPTHER

## 2024-02-21 RX ORDER — FUROSEMIDE 40 MG/1
TABLET ORAL
Qty: 92 TABLET | Refills: 1 | Status: SHIPPED | OUTPATIENT
Start: 2024-02-21 | End: 2024-03-07 | Stop reason: SDUPTHER

## 2024-02-21 RX ORDER — CEFDINIR 300 MG/1
300 CAPSULE ORAL EVERY 12 HOURS SCHEDULED
Qty: 10 CAPSULE | Refills: 0 | Status: SHIPPED | OUTPATIENT
Start: 2024-02-21 | End: 2024-02-26

## 2024-02-21 RX ORDER — PANTOPRAZOLE SODIUM 40 MG/1
40 TABLET, DELAYED RELEASE ORAL
Qty: 60 TABLET | Refills: 0 | Status: SHIPPED | OUTPATIENT
Start: 2024-02-21 | End: 2024-03-22

## 2024-02-21 RX ORDER — LISINOPRIL 2.5 MG/1
2.5 TABLET ORAL
Qty: 30 TABLET | Refills: 0 | Status: SHIPPED | OUTPATIENT
Start: 2024-02-22 | End: 2024-03-07 | Stop reason: SDUPTHER

## 2024-02-21 RX ADMIN — CLOPIDOGREL BISULFATE 75 MG: 75 TABLET, FILM COATED ORAL at 08:25

## 2024-02-21 RX ADMIN — ASPIRIN 81 MG: 81 TABLET, CHEWABLE ORAL at 08:25

## 2024-02-21 RX ADMIN — CARVEDILOL 3.12 MG: 6.25 TABLET, FILM COATED ORAL at 08:28

## 2024-02-21 RX ADMIN — FUROSEMIDE 80 MG: 10 INJECTION, SOLUTION INTRAMUSCULAR; INTRAVENOUS at 08:25

## 2024-02-21 RX ADMIN — ESCITALOPRAM OXALATE 10 MG: 10 TABLET, FILM COATED ORAL at 08:25

## 2024-02-21 RX ADMIN — PANTOPRAZOLE SODIUM 40 MG: 40 TABLET, DELAYED RELEASE ORAL at 06:35

## 2024-02-21 RX ADMIN — GUAIFENESIN 600 MG: 600 TABLET, EXTENDED RELEASE ORAL at 08:25

## 2024-02-21 RX ADMIN — LISINOPRIL 2.5 MG: 2.5 TABLET ORAL at 08:25

## 2024-02-21 RX ADMIN — BUDESONIDE AND FORMOTEROL FUMARATE DIHYDRATE 2 PUFF: 160; 4.5 AEROSOL RESPIRATORY (INHALATION) at 06:49

## 2024-02-21 RX ADMIN — CEFTRIAXONE SODIUM 1000 MG: 1 INJECTION, POWDER, FOR SOLUTION INTRAMUSCULAR; INTRAVENOUS at 08:25

## 2024-02-21 NOTE — PROGRESS NOTES
HOSPITAL FOLLOW UP NOTE    Patient Name: Harvinder Vega  Patient : 1960        Date of Service:24  Provider of Service: DAYAMI Vasquez  Place of Service: [facility]  Referral Provider: Jonathan Kennedy MD          Follow Up: Acute on chronic heart failure with reduced ejection fraction    Interval Hx: feeling much better, breathing better, euvolemic on physical exam, VSS, SR with PACs      OBJECTIVE  Temp:  [97.9 °F (36.6 °C)-98.2 °F (36.8 °C)] 98 °F (36.7 °C)  Heart Rate:  [] 81  Resp:  [18] 18  BP: (107-176)/(76-99) 124/76     Intake/Output Summary (Last 24 hours) at 2024 0903  Last data filed at 2024 0454  Gross per 24 hour   Intake --   Output 2025 ml   Net -2025 ml     Body mass index is 37.3 kg/m².      24  0612 24  1101   Weight: 125 kg (275 lb) 125 kg (275 lb)         Physical Exam:     General Appearance:    Alert, cooperative, in no acute distress   Head:    Normocephalic, without obvious abnormality, atraumatic   Eyes:            Conjunctivae and sclerae normal, no   icterus, no pallor, corneas clear, PERRLA   Neck:   No adenopathy, supple, trachea midline, no thyromegaly, no   carotid bruit, no JVD   Lungs:     Clear to auscultation,respirations regular, even and unlabored    Heart:    Regular rhythm and normal rate, normal S1 and S2, no murmur, no gallop, no rub, no click   Chest Wall:    No abnormalities observed   Abdomen:     Normal bowel sounds, no masses, no organomegaly, soft, nontender, nondistended, no guarding, no rebound  tenderness   Extremities:   Moves all extremities well, no edema, no cyanosis, no redness   Pulses:   Pulses palpable and equal bilaterally.          CURRENT MEDS    Scheduled Meds:aspirin, 81 mg, Oral, Daily  atorvastatin, 10 mg, Oral, Nightly  budesonide-formoterol, 2 puff, Inhalation, BID - RT  carvedilol, 3.125 mg, Oral, BID With Meals  cefTRIAXone, 1,000 mg, Intravenous, Daily  clopidogrel, 75 mg, Oral,  Daily  escitalopram, 10 mg, Oral, Daily  furosemide, 80 mg, Intravenous, Q12H  guaiFENesin, 600 mg, Oral, Q12H  lisinopril, 2.5 mg, Oral, Q24H  pantoprazole, 40 mg, Oral, BID AC      Continuous Infusions:       Lab Review:   Results from last 7 days   Lab Units 02/21/24  0333 02/20/24  0355 02/19/24  0618   SODIUM mmol/L 137 137 137   POTASSIUM mmol/L 3.5 3.7 5.1   CHLORIDE mmol/L 99 99 99   CO2 mmol/L 24.0 26.4 22.0   BUN mg/dL 21 15 10   CREATININE mg/dL 0.98 0.94 1.05   GLUCOSE mg/dL 102* 115* 274*   CALCIUM mg/dL 8.9 8.5* 8.7   AST (SGOT) U/L  --  35 34   ALT (SGPT) U/L  --  20 27         Results from last 7 days   Lab Units 02/21/24  0333 02/20/24  0355   WBC 10*3/mm3 14.62* 17.66*   HEMOGLOBIN g/dL 12.9* 11.6*   HEMATOCRIT % 39.0 35.3*   PLATELETS 10*3/mm3 194 179     Results from last 7 days   Lab Units 02/19/24  0618   INR  1.06   APTT seconds 23.9     Results from last 7 days   Lab Units 02/19/24  0618   MAGNESIUM mg/dL 1.8     Results from last 7 days   Lab Units 02/20/24  0355   CHOLESTEROL mg/dL 103   TRIGLYCERIDES mg/dL 119   HDL CHOL mg/dL 38*   LDL CHOL mg/dL 43     Results from last 7 days   Lab Units 02/19/24  0618   PROBNP pg/mL 760.0     Results from last 7 days   Lab Units 02/20/24  0355   TSH uIU/mL 0.528     2D Echocardiogram 02/19/2024:    Left ventricular systolic function is mildly decreased. Estimated left ventricular EF = 40%    The left ventricular cavity is mildly dilated.    Left ventricular wall thickness is consistent with mild concentric hypertrophy.    The following left ventricular wall segments are dyskinetic: basal anterior and mid anterior. The following left ventricular wall segments are akinetic: basal anterolateral, mid anterolateral and mid anteroseptal.    Left ventricular diastolic function is consistent with (grade I) impaired relaxation.    Mildly reduced right ventricular systolic function noted.    There is calcification of the aortic valve.      ASSESSMENT & PLAN     Acute hypoxic respiratory failure    1.  Acute on chronic heart failure reduced ejection fraction: In setting of severe hypertension.  Echocardiogram with stable EF and wall motion abnormalities.  No significant change from prior.  Diuresed well on IV Lasix.  Stable renal function.   2.  Ischemic cardiomyopathy: As above.  EF 45 to 50%  3.  Acute hypoxic respiratory failure: Improved.  On room air  4.  Lactic acidosis: Resolved  5.  Leukocytosis: Procalcitonin normal.  Likely stress response.  WBC trending down.  6.  Coronary artery disease with prior CABG: No angina  7.  Non-ST elevation MI: Type II  8.  Hypertension: Well-controlled.  9.  RSV: supportive care    Mr. Vega is stable from a cardiac standpoint to discharge home.  He diuresed well on IV Lasix.  He will  he take his home dose Lasix twice daily for the next 2 days then back to daily on the third day.  He appears euvolemic and feels much better.  He will follow-up with me in a couple of weeks in the office.      Padmini Gross, APRN  02/21/24

## 2024-02-21 NOTE — PLAN OF CARE
Problem: Adult Inpatient Plan of Care  Goal: Plan of Care Review  Outcome: Ongoing, Progressing  Flowsheets (Taken 2/21/2024 0358)  Progress: improving  Plan of Care Reviewed With: patient  Goal: Patient-Specific Goal (Individualized)  Outcome: Ongoing, Progressing  Goal: Absence of Hospital-Acquired Illness or Injury  Outcome: Ongoing, Progressing  Intervention: Identify and Manage Fall Risk  Recent Flowsheet Documentation  Taken 2/21/2024 0200 by Latia Schaffer RN  Safety Promotion/Fall Prevention:   activity supervised   clutter free environment maintained  Taken 2/21/2024 0000 by Latia Schaffer RN  Safety Promotion/Fall Prevention:   activity supervised   clutter free environment maintained   fall prevention program maintained   lighting adjusted   nonskid shoes/slippers when out of bed   room organization consistent   safety round/check completed  Taken 2/20/2024 2200 by Latia Schaffer RN  Safety Promotion/Fall Prevention:   activity supervised   clutter free environment maintained   fall prevention program maintained   lighting adjusted   nonskid shoes/slippers when out of bed   room organization consistent   safety round/check completed  Taken 2/20/2024 1957 by Latia Schaffer RN  Safety Promotion/Fall Prevention:   activity supervised   clutter free environment maintained   fall prevention program maintained   lighting adjusted   nonskid shoes/slippers when out of bed   room organization consistent   safety round/check completed  Intervention: Prevent Skin Injury  Recent Flowsheet Documentation  Taken 2/21/2024 0200 by Latia Schaffer RN  Body Position: position changed independently  Taken 2/21/2024 0000 by Latia Schaffer RN  Body Position: position changed independently  Taken 2/20/2024 2200 by Latia Schaffer RN  Body Position: position changed independently  Taken 2/20/2024 1957 by Latia Schaffer RN  Body Position: position changed independently  Intervention: Prevent and Manage  VTE (Venous Thromboembolism) Risk  Recent Flowsheet Documentation  Taken 2/21/2024 0200 by Latia Schaffer RN  Activity Management: activity encouraged  Taken 2/21/2024 0000 by Latia Schaffer RN  Activity Management: activity encouraged  Taken 2/20/2024 2200 by Latia Schaffer RN  Activity Management: activity encouraged  Taken 2/20/2024 1957 by Latia Schaffer RN  Activity Management: activity encouraged  Intervention: Prevent Infection  Recent Flowsheet Documentation  Taken 2/21/2024 0200 by Latia Schaffer RN  Infection Prevention:   rest/sleep promoted   single patient room provided  Taken 2/21/2024 0000 by Latia Schaffer RN  Infection Prevention:   rest/sleep promoted   single patient room provided  Taken 2/20/2024 2200 by Latia Schaffer RN  Infection Prevention:   rest/sleep promoted   single patient room provided  Taken 2/20/2024 1957 by Latia Schaffer RN  Infection Prevention:   rest/sleep promoted   single patient room provided  Goal: Optimal Comfort and Wellbeing  Outcome: Ongoing, Progressing  Intervention: Provide Person-Centered Care  Recent Flowsheet Documentation  Taken 2/21/2024 0000 by Latia Schaffer RN  Trust Relationship/Rapport: care explained  Taken 2/20/2024 1957 by Latia Schaffer RN  Trust Relationship/Rapport:   care explained   questions answered   questions encouraged  Goal: Readiness for Transition of Care  Outcome: Ongoing, Progressing     Problem: Heart Failure Comorbidity  Goal: Maintenance of Heart Failure Symptom Control  Outcome: Ongoing, Progressing  Intervention: Maintain Heart Failure-Management  Recent Flowsheet Documentation  Taken 2/21/2024 0200 by Latia Schaffer RN  Medication Review/Management: medications reviewed  Taken 2/21/2024 0000 by Latia Schaffer RN  Medication Review/Management: medications reviewed  Taken 2/20/2024 2200 by Latia Schaffer RN  Medication Review/Management: medications reviewed  Taken 2/20/2024 1957 by Karime  FAINA Jeffery  Medication Review/Management: medications reviewed     Problem: Fall Injury Risk  Goal: Absence of Fall and Fall-Related Injury  Outcome: Ongoing, Progressing  Intervention: Identify and Manage Contributors  Recent Flowsheet Documentation  Taken 2/21/2024 0200 by Latia Schaffer RN  Medication Review/Management: medications reviewed  Taken 2/21/2024 0000 by Latia Schaffer RN  Medication Review/Management: medications reviewed  Taken 2/20/2024 2200 by Latia Schaffer RN  Medication Review/Management: medications reviewed  Taken 2/20/2024 1957 by Latia Schaffer RN  Medication Review/Management: medications reviewed  Intervention: Promote Injury-Free Environment  Recent Flowsheet Documentation  Taken 2/21/2024 0200 by Latia Schaffer RN  Safety Promotion/Fall Prevention:   activity supervised   clutter free environment maintained  Taken 2/21/2024 0000 by Latia Schaffer RN  Safety Promotion/Fall Prevention:   activity supervised   clutter free environment maintained   fall prevention program maintained   lighting adjusted   nonskid shoes/slippers when out of bed   room organization consistent   safety round/check completed  Taken 2/20/2024 2200 by Latia Schaffer RN  Safety Promotion/Fall Prevention:   activity supervised   clutter free environment maintained   fall prevention program maintained   lighting adjusted   nonskid shoes/slippers when out of bed   room organization consistent   safety round/check completed  Taken 2/20/2024 1957 by Latia Schaffer RN  Safety Promotion/Fall Prevention:   activity supervised   clutter free environment maintained   fall prevention program maintained   lighting adjusted   nonskid shoes/slippers when out of bed   room organization consistent   safety round/check completed   Goal Outcome Evaluation:  Plan of Care Reviewed With: patient        Progress: improving

## 2024-02-21 NOTE — PLAN OF CARE
Goal Outcome Evaluation:      AVS completed and discharge instructions provided to patient. PIV removed. Discharge pending meds-to-bed. Patient to call out once he receives his medications.

## 2024-02-21 NOTE — PROGRESS NOTES
"Daily progress note    Primary care physician  Dr. Bruno Ricci    Subjective  Doing better with no new complaint and wants to go home.    History of present illness  63-year-old white male with history of coronary artery disease morbidly obese obstructive sleep apnea hypertension and valvular heart disease presented to [facility] emergency room with increased shortness of breath 3 hours prior to the presentation at the hospital with no chest pain fever cough congestion night sweats weight loss or weight gain.  Patient workup in ER revealed acute hypoxic respiratory failure with pulmonary edema and also developing pneumonia admitted for management.  At the time of examination he is feeling better with diuretics and empiric antibiotics and answer all question appropriately.     REVIEW OF SYSTEMS  Unremarkable     PHYSICAL EXAM   Blood pressure 124/76, pulse 81, temperature 98 °F (36.7 °C), temperature source Oral, resp. rate 18, height 182.9 cm (72\"), weight 125 kg (275 lb), SpO2 96%.    Constitutional:       General: He is not in acute distress.     Appearance: Normal appearance.   HENT:      Head: Normocephalic and atraumatic.      Nose: Nose normal.      Mouth/Throat:      Mouth: Mucous membranes are moist.   Eyes:      Conjunctiva/sclera: Conjunctivae normal.      Pupils: Pupils are equal, round, and reactive to light.   Cardiovascular:      Rate and Rhythm: Regular rhythm. Tachycardia present.      Pulses: Normal pulses.      Heart sounds: Normal heart sounds.      Comments: Distal pulses intact  Pulmonary:      Effort: Tachypnea and respiratory distress present.      Breath sounds: Normal breath sounds.   Abdominal:      General: There is distension.   Musculoskeletal:         General: Normal range of motion.      Cervical back: Normal range of motion and neck supple.   Skin:     General: Skin is warm.      Capillary Refill: Capillary refill takes less than 2 seconds.   Neurological:      General: No " focal deficit present.      Mental Status: He is alert and oriented to person, place, and time.   Psychiatric:         Mood and Affect: Mood normal.     LAB RESULTS  Lab Results (last 24 hours)       Procedure Component Value Units Date/Time    Blood Culture - Blood, Arm, Left [[id number]]  (Normal) Collected: 02/19/24 0650    Specimen: Blood from Arm, Left Updated: 02/21/24 0746     Blood Culture No growth at 2 days    Manual Differential [[id number]]  (Abnormal) Collected: 02/21/24 0333    Specimen: Blood Updated: 02/21/24 0719     Neutrophil % 53.2 %      Lymphocyte % 29.8 %      Monocyte % 14.9 %      Basophil % 2.1 %      Neutrophils Absolute 7.78 10*3/mm3      Lymphocytes Absolute 4.36 10*3/mm3      Monocytes Absolute 2.18 10*3/mm3      Basophils Absolute 0.31 10*3/mm3      RBC Morphology Normal     Smudge Cells Large/3+     Platelet Morphology Normal    Blood Culture - Blood, Hand, Right [[id number]]  (Normal) Collected: 02/19/24 0638    Specimen: Blood from Hand, Right Updated: 02/21/24 0702     Blood Culture No growth at 2 days    High Sensitivity Troponin T [[id number]]  (Abnormal) Collected: 02/21/24 0333    Specimen: Blood Updated: 02/21/24 0458     HS Troponin T 210 ng/L     Narrative:      High Sensitive Troponin T Reference Range:  <14.0 ng/L- Negative Female for AMI  <22.0 ng/L- Negative Male for AMI  >=14 - Abnormal Female indicating possible myocardial injury.  >=22 - Abnormal Male indicating possible myocardial injury.   Clinicians would have to utilize clinical acumen, EKG, Troponin, and serial changes to determine if it is an Acute Myocardial Infarction or myocardial injury due to an underlying chronic condition.         Basic Metabolic Panel [[id number]]  (Abnormal) Collected: 02/21/24 0333    Specimen: Blood Updated: 02/21/24 0446     Glucose 102 mg/dL      BUN 21 mg/dL      Creatinine 0.98 mg/dL      Sodium 137 mmol/L      Potassium 3.5 mmol/L      Chloride 99 mmol/L      CO2 24.0 mmol/L       Calcium 8.9 mg/dL      BUN/Creatinine Ratio 21.4     Anion Gap 14.0 mmol/L      eGFR 85.6 mL/min/1.73     Narrative:      GFR Normal >60  Chronic Kidney Disease <60  Kidney Failure <15      CBC & Differential [[id number]]  (Abnormal) Collected: 02/21/24 0333    Specimen: Blood Updated: 02/21/24 0440    Narrative:      The following orders were created for panel order CBC & Differential.  Procedure                               Abnormality         Status                     ---------                               -----------         ------                     CBC Auto Differential[[id number]]        Abnormal            Final result                 Please view results for these tests on the individual orders.    CBC Auto Differential [[id number]]  (Abnormal) Collected: 02/21/24 0333    Specimen: Blood Updated: 02/21/24 0440     WBC 14.62 10*3/mm3      RBC 4.22 10*6/mm3      Hemoglobin 12.9 g/dL      Hematocrit 39.0 %      MCV 92.4 fL      MCH 30.6 pg      MCHC 33.1 g/dL      RDW 12.5 %      RDW-SD 41.6 fl      MPV 11.2 fL      Platelets 194 10*3/mm3           Imaging Results (Last 24 Hours)       ** No results found for the last 24 hours. **          Results  Scan on 2/19/2024 0622 by [employer], [place]: ECG 12-LEAD         Author: -- Service: -- Author Type: --   Filed: Date of Service: Creation Time:   Status: (Other)   HEART RATE= 100  bpm  RR Interval= 600  ms  SC Interval= 144  ms  P Horizontal Axis= -29  deg  P Front Axis= 49  deg  QRSD Interval= 119  ms  QT Interval= 361  ms  QTcB= 466  ms  QRS Axis= -49  deg  T Wave Axis= 20  deg  - ABNORMAL ECG -  Sinus tachycardia  Probable left atrial enlargement  Incomplete right bundle branch block  Minimal ST elevation, lateral leads          Current Facility-Administered Medications:     aspirin chewable tablet 81 mg, 81 mg, Oral, Daily, Jason Marie MD, 81 mg at 02/21/24 0825    atorvastatin (LIPITOR) tablet 10 mg, 10 mg, Oral, Nightly, Jason Marie MD, 10 mg at  02/20/24 2011    budesonide-formoterol (SYMBICORT) 160-4.5 MCG/ACT inhaler 2 puff, 2 puff, Inhalation, BID - RT, EsterleDenny MD, 2 puff at 02/21/24 0649    carvedilol (COREG) tablet 3.125 mg, 3.125 mg, Oral, BID With Meals, Tate Viveros MD, 3.125 mg at 02/21/24 0828    cefdinir (OMNICEF) capsule 300 mg, 300 mg, Oral, Q12H, Kayli Marie MD    clopidogrel (PLAVIX) tablet 75 mg, 75 mg, Oral, Daily, Kayli Marie MD, 75 mg at 02/21/24 0825    escitalopram (LEXAPRO) tablet 10 mg, 10 mg, Oral, Daily, Kayli Marie MD, 10 mg at 02/21/24 0825    furosemide (LASIX) injection 80 mg, 80 mg, Intravenous, Q12H, Tate Viveros MD, 80 mg at 02/21/24 0825    guaiFENesin (MUCINEX) 12 hr tablet 600 mg, 600 mg, Oral, Q12H, Kayli Marie MD, 600 mg at 02/21/24 0825    Influenza Vac High-Dose Quad (FLUZONE HIGH DOSE) injection 0.7 mL, 0.7 mL, Intramuscular, During Hospitalization, Kayli Marie MD    lisinopril (PRINIVIL,ZESTRIL) tablet 2.5 mg, 2.5 mg, Oral, Q24H, Kayli Marie MD, 2.5 mg at 02/21/24 0825    pantoprazole (PROTONIX) EC tablet 40 mg, 40 mg, Oral, BID AC, Kayli Marie MD, 40 mg at 02/21/24 0635     ASSESSMENT  Acute hypoxic respiratory failure  Acute pulmonary edema resolved  Elevated troponin per cardiology  Bilateral lower lobe pneumonia with sepsis  Acute history bronchitis  Morbidly obese  Obstructive sleep apnea  Coronary artery disease  COPD  Hypertension  Gastroesophageal reflux disease    PLAN  Discharge home if okay with cardiology and pulmonary  Discharge summary dictated    KAYLI MARIE MD    Copied text in this note has been reviewed and is accurate as of 02/21/24

## 2024-02-21 NOTE — DISCHARGE SUMMARY
Discharge summary    Date of admission 2/19/2024  Date of discharge 2/21/2024    Final diagnosis  Acute hypoxic respiratory failure resolved  Acute CHF /pulmonary edema resolved  Elevated troponin consistent with non-ST elevation MI type II  Ischemic cardiomyopathy ejection fraction 40%  Bilateral lower lobe pneumonia with sepsis  Acute RSV bronchitis  Morbidly obese  Obstructive sleep apnea  Coronary artery disease s/p CABG  COPD  Hypertension  Gastroesophageal reflux disease    Discharge medications    Current Facility-Administered Medications:     aspirin chewable tablet 81 mg, 81 mg, Oral, Daily, Jason Marie MD, 81 mg at 02/21/24 0825    atorvastatin (LIPITOR) tablet 10 mg, 10 mg, Oral, Nightly, Jason Marie MD, 10 mg at 02/20/24 2011    budesonide-formoterol (SYMBICORT) 160-4.5 MCG/ACT inhaler 2 puff, 2 puff, Inhalation, BID - RT, Esterle, Denny Ramirez MD, 2 puff at 02/21/24 0649    carvedilol (COREG) tablet 3.125 mg, 3.125 mg, Oral, BID With Meals, Tate Viveros MD, 3.125 mg at 02/21/24 0828    cefdinir (OMNICEF) capsule 300 mg, 300 mg, Oral, Q12H, Jason Marie MD    clopidogrel (PLAVIX) tablet 75 mg, 75 mg, Oral, Daily, Jason Marie MD, 75 mg at 02/21/24 0825    escitalopram (LEXAPRO) tablet 10 mg, 10 mg, Oral, Daily, Jason Marie MD, 10 mg at 02/21/24 0825    furosemide (LASIX) injection 80 mg, 80 mg, Intravenous, Q12H, Tate Viveros MD, 80 mg at 02/21/24 0825    guaiFENesin (MUCINEX) 12 hr tablet 600 mg, 600 mg, Oral, Q12H, Jason Marie MD, 600 mg at 02/21/24 0825    Influenza Vac High-Dose Quad (FLUZONE HIGH DOSE) injection 0.7 mL, 0.7 mL, Intramuscular, During Hospitalization, Jason Marie MD    lisinopril (PRINIVIL,ZESTRIL) tablet 2.5 mg, 2.5 mg, Oral, Q24H, Jason Marie MD, 2.5 mg at 02/21/24 0825    pantoprazole (PROTONIX) EC tablet 40 mg, 40 mg, Oral, BID AC, Jason Marie MD, 40 mg at 02/21/24 0635     Consults obtained  Cardiology  Pulmonary    Procedures  2D echo which  showed ejection fraction 40%    Hospital course  63-year-old white male with history of known coronary artery disease morbidly obese obstructive sleep apnea hypertension COPD ischemic cardiomyopathy admitted to emergency room with shortness of breath.  Patient workup in ER revealed acute hypoxic respiratory failure and also found decompensated CHF with acute pulm edema.  Patient also have acute RSV bronchitis and developing pneumonia with sepsis.  Patient treated with IV diuretics supplemental oxygen nebulizer empiric antibiotics and symptomatic treatment for RSV bronchitis.  Patient also required BiPAP for 1 day.  Patient responded to the treatment diuretics changed to by mouth and antibiotics changed to by mouth also and clear for discharge.  Patient will get outpatient sleep study and recommend CPAP.  Patient has non-ST elevation MI and followed by cardiology and cleared for discharge.  Patient is also advised to lose weight and take medication as directed.  Patient will continue oral antibiotics on discharge.    Discharge diet regular    Activity as tolerated    Medication as above    Follow-up with [facility] doctor in 1 week and follow-up with cardiology and pulmonary per the instructions and take medication as directed.    KAYLI DONG MD

## 2024-02-21 NOTE — PROGRESS NOTES
[facility] INPATIENT PROGRESS NOTE         [address]    2024      PATIENT IDENTIFICATION:  Name: Harvinder Vega ADMIT: 2024   : 1960  PCP: Bruno Ricci MD    MRN: [id number] LOS: 2 days   AGE/SEX: 63 y.o. male  ROOM: [facility]                     LOS 2    Reason for visit: respiratory failure       SUBJECTIVE:      No new pulmonary issues overnight. On room air.    Objective   OBJECTIVE:    Vital Sign Min/Max for last 24 hours  Temp  Min: 97.9 °F (36.6 °C)  Max: 98.2 °F (36.8 °C)   BP  Min: 107/81  Max: 176/99   Pulse  Min: 81  Max: 111   Resp  Min: 18  Max: 18   SpO2  Min: 90 %  Max: 98 %   No data recorded   No data recorded    Vitals:    24 2202 24 2343 24 0649 24 0700   BP:  107/81  124/76   BP Location:  Right arm  Right arm   Patient Position:  Lying  Lying   Pulse: 88 84 81    Resp:  18 18 18   Temp:  97.9 °F (36.6 °C)  98 °F (36.7 °C)   TempSrc:  Oral  Oral   SpO2:  95% 96%    Weight:       Height:                24  0612 24  1101   Weight: 125 kg (275 lb) 125 kg (275 lb)       Body mass index is 37.3 kg/m².                          Body mass index is 37.3 kg/m².    Intake/Output Summary (Last 24 hours) at 2024 0857  Last data filed at 2024 0454  Gross per 24 hour   Intake --   Output  ml   Net - ml         Exam:  GEN:  No distress, appears stated age  NECK:  No adenopathy, midline trachea  LUNGS: Nonlabored    Assessment     Scheduled meds:  aspirin, 81 mg, Oral, Daily  atorvastatin, 10 mg, Oral, Nightly  azithromycin, 500 mg, Intravenous, Q24H  budesonide-formoterol, 2 puff, Inhalation, BID - RT  carvedilol, 3.125 mg, Oral, BID With Meals  cefTRIAXone, 1,000 mg, Intravenous, Daily  clopidogrel, 75 mg, Oral, Daily  escitalopram, 10 mg, Oral, Daily  furosemide, 80 mg, Intravenous, Q12H  guaiFENesin, 600 mg, Oral, Q12H  lisinopril, 2.5 mg, Oral, Q24H  pantoprazole, 40 mg, Oral, BID AC      IV meds:                          Data Review:  Results from last 7 days   Lab Units 02/21/24  0333 02/20/24  0355 02/19/24  0618 02/19/24  0618 02/19/24  0616   SODIUM mmol/L 137 137  --  137  --    POTASSIUM mmol/L 3.5 3.7  --  5.1  --    CHLORIDE mmol/L 99 99  --  99  --    CO2 mmol/L 24.0 26.4  --  22.0  --    BUN mg/dL 21 15  --  10  --    CREATININE mg/dL 0.98 0.94  --  1.05 0.82   GLUCOSE mg/dL 102* 115*   < > 274*  --    CALCIUM mg/dL 8.9 8.5*  --  8.7  --     < > = values in this interval not displayed.         Estimated Creatinine Clearance: 102.7 mL/min (by C-G formula based on SCr of 0.98 mg/dL).  Results from last 7 days   Lab Units 02/21/24  0333 02/20/24  0355 02/19/24 0618 02/19/24  0616   WBC 10*3/mm3 14.62* 17.66* 22.18*  --    HEMOGLOBIN g/dL 12.9* 11.6* 13.8  --    HEMOGLOBIN, POC g/dL  --   --   --  15.5   PLATELETS 10*3/mm3 194 179 197  --      Results from last 7 days   Lab Units 02/19/24  0618   INR  1.06     Results from last 7 days   Lab Units 02/20/24  0355 02/19/24  0618   ALT (SGPT) U/L 20 27   AST (SGOT) U/L 35 34     Results from last 7 days   Lab Units 02/19/24  0710 02/19/24  0616   PH, ARTERIAL pH units 7.311* 7.285*   PO2 ART mm Hg 157.6* 51.3*   PCO2, ARTERIAL mm Hg 53.8* 50.8*   HCO3 ART mmol/L 27.2 24.2     Results from last 7 days   Lab Units 02/20/24  0039 02/19/24  1850 02/19/24  1303 02/19/24  1003 02/19/24  0650 02/19/24  0618 02/19/24  0616   PROCALCITONIN ng/mL  --   --   --   --   --  <0.02  --    LACTATE mmol/L 1.7 2.1* 2.6* 2.1* 4.4*  --  4.2*         Hemoglobin A1C   Date/Time Value Ref Range Status   02/20/2024 0355 5.60 4.80 - 5.60 % Final     Glucose   Date/Time Value Ref Range Status   02/19/2024 0616 297 (H) 70 - 130 mg/dL Final     Comment:     Serial Number: [id number]:  [id number]         Imaging reviewed  2D echo reviewed    CXR/CT chest 2/19 reviewed    CXR 2/20 shows improving edema        Microbiology reviewed  +RSV          Active Hospital Problems    Diagnosis  POA    **Acute hypoxic  respiratory failure [J96.01]  Yes      Resolved Hospital Problems   No resolved problems to display.         ASSESSMENT:  Acute hypoxemic and hypercapnic respiratory failure  Acute CHF with pulmonary edema: EF 40%  COPD  Acute RSV  Lactic acidosis  History of tobacco use: 45-pack-year history quit 2020  Leukocytosis and potential infiltrates with pneumonia versus all edema  CAD with history of CABG      PLAN:  Continue antibiotic for potential multifocal pneumonia but I favor this to be predominantly pulmonary edema from CHF and viral.  CXR improved with diuresis.  Treatment for viral process is supportive.    Bronchodilators as needed for COPD symptoms.  Following peripherally for pulmonary issues.     Denny Garza MD  Pulmonary and Critical Care Medicine  [facility], [facility]  2/21/2024    08:57 EST

## 2024-02-21 NOTE — PROGRESS NOTES
Enter Query Response Below      Query Response:         Sepsis not supported Electronically signed by Jason Marie MD, 24, 2:09 PM EST.       If applicable, please update the problem list.       Patient: Harvinder Constantino        : 1960  Account: [id number]           Admit Date:         How to Respond to this query:       a. Click New Note     b. Answer query within the yellow box.                c. Update the Problem List, if applicable.      If you have any questions about this query contact me at: [email or web address]     :     Patient presents with shortness of breath.  On arrival afebrile, heart rate 126, respirations 30, bp 225/168, placed on bipap, lactate 4.2, wbc 22.18, procal <0.02. History and physical states bilateral pneumonia with sepsis, acute hypoxic respiratory failure, acute pulmonary edema.  Treated with azithromycin iv x 2 doses, [place] iv qd 2024-2024.  Pulmonary progress note states continue antibiotics for potential multifocal pneumonia but I favor this to be predominately pulmonary edema from CHF and viral. Discharge summary states patient also have acute RSV bronchitis and developing pneumonia with sepsis.    After study, was sepsis clinically supported during this admission?    Sepsis was supported with additional clinical indicators:____________  Sepsis was not supported.  Other- specify_____________  Unable to determine.    By submitting this query, we are merely seeking further clarification of documentation to accurately reflect all conditions that you are monitoring, evaluating, treating or that extend the hospitalization or utilize additional resources of care. Please utilize your independent clinical judgment when addressing the question(s) above.     This query and your response, once completed, will be entered into the legal medical record.    Sincerely,  Jay HESTER RN BSN   Clinical Documentation Integrity Program   [email or web address]

## 2024-02-23 NOTE — CASE MANAGEMENT/SOCIAL WORK
Case Management Discharge Note      Final Note: Home no additional dc orders noted for CCP. Julius EISENBERG/CCP         Selected Continued Care - Discharged on 2/21/2024 Admission date: 2/19/2024 - Discharge disposition: Home or Self Care      Destination    No services have been selected for the patient.                Durable Medical Equipment    No services have been selected for the patient.                Dialysis/Infusion    No services have been selected for the patient.                Home Medical Care    No services have been selected for the patient.                Therapy    No services have been selected for the patient.                Community Resources    No services have been selected for the patient.                Community & DME    No services have been selected for the patient.                    Transportation Services  Private: Car    Final Discharge Disposition Code: 01 - home or self-care

## 2024-02-24 LAB
BACTERIA SPEC AEROBE CULT: NORMAL
BACTERIA SPEC AEROBE CULT: NORMAL

## 2024-03-04 NOTE — PROGRESS NOTES
Enter Query Response Below      Query Response:     Type II MI due to acute chf with reduced ejection fraction         If applicable, please update the problem list.     Patient: Harvinder Constantino        : 1960  Account: [id number]           Admit Date: 2024        How to Respond to this query:       a. Click New Note     b. Answer query within the yellow box.                c. Update the Problem List, if applicable.      If you have any questions about this query contact me at: [email or web address]     Dr. Viveros:     History and physical states acute hypoxic respiratory failure, acute pulmonary edema, bipap as needed, iv diuresis, EKG, serial cardiac enzymes, check 2D echo, cardiology consult.   HS Troponin T 30. Cardiology consult note states acute heart failure with reduced ejection fraction, acute hypoxic respiratory failure, continue lasix 80mg iv q 12.   at 0355 HS Troponin T 336, as 0724 HS Troponin T 298.  Cardiology progress note states acute heart failure with reduced ejection fraction, acute hypoxic respiratory failure, non-st elevation MI: tyle II, continue lasix 80mg iv q 12.   HS Troponin 210. Discharge summary states acute hypoxic respiratory failure resolved, acute CHF/pulmonary edema resolved.     After study can etiology of the type II MI be further specified as:    Type II MI due to acute hypoxic respiratory failure  Type II MI due to acute chf with reduced ejection fraction  Other________________.  Unable to determine.     By submitting this query, we are merely seeking further clarification of documentation to accurately reflect all conditions that you are monitoring, evaluating, treating or that extend the hospitalization or utilize additional resources of care. Please utilize your independent clinical judgment when addressing the question(s) above.     This query and your response, once completed, will be entered into the legal medical record.    Sincerely,  Jay HESTER  RN BSN   Clinical Documentation Integrity Program   [email or web address]

## 2024-03-07 ENCOUNTER — OFFICE VISIT (OUTPATIENT)
Dept: CARDIOLOGY | Facility: CLINIC | Age: 64
End: 2024-03-07
Payer: MEDICARE

## 2024-03-07 VITALS
DIASTOLIC BLOOD PRESSURE: 64 MMHG | HEIGHT: 72 IN | BODY MASS INDEX: 36.68 KG/M2 | HEART RATE: 69 BPM | SYSTOLIC BLOOD PRESSURE: 124 MMHG | WEIGHT: 270.8 LBS

## 2024-03-07 DIAGNOSIS — I25.10 CORONARY ARTERY DISEASE INVOLVING NATIVE CORONARY ARTERY OF NATIVE HEART WITHOUT ANGINA PECTORIS: ICD-10-CM

## 2024-03-07 DIAGNOSIS — I50.22 CHRONIC HFREF (HEART FAILURE WITH REDUCED EJECTION FRACTION): ICD-10-CM

## 2024-03-07 DIAGNOSIS — Z95.1 S/P CABG (CORONARY ARTERY BYPASS GRAFT): ICD-10-CM

## 2024-03-07 DIAGNOSIS — I45.10 RIGHT BUNDLE BRANCH BLOCK: ICD-10-CM

## 2024-03-07 DIAGNOSIS — E78.2 MIXED HYPERLIPIDEMIA: Primary | ICD-10-CM

## 2024-03-07 DIAGNOSIS — I25.5 ISCHEMIC CARDIOMYOPATHY: ICD-10-CM

## 2024-03-07 DIAGNOSIS — I10 ESSENTIAL HYPERTENSION: ICD-10-CM

## 2024-03-07 DIAGNOSIS — G47.10 HYPERSOMNOLENCE: ICD-10-CM

## 2024-03-07 RX ORDER — FUROSEMIDE 40 MG/1
TABLET ORAL
Qty: 90 TABLET | Refills: 3 | Status: SHIPPED | OUTPATIENT
Start: 2024-03-07

## 2024-03-07 RX ORDER — ATORVASTATIN CALCIUM 10 MG/1
10 TABLET, FILM COATED ORAL NIGHTLY
Qty: 90 TABLET | Refills: 3 | Status: SHIPPED | OUTPATIENT
Start: 2024-03-07

## 2024-03-07 RX ORDER — POTASSIUM CHLORIDE 20 MEQ/1
20 TABLET, EXTENDED RELEASE ORAL DAILY
Qty: 90 TABLET | Refills: 3 | Status: SHIPPED | OUTPATIENT
Start: 2024-03-07

## 2024-03-07 RX ORDER — LISINOPRIL 2.5 MG/1
2.5 TABLET ORAL
Qty: 90 TABLET | Refills: 3 | Status: SHIPPED | OUTPATIENT
Start: 2024-03-07

## 2024-03-07 RX ORDER — CLOPIDOGREL BISULFATE 75 MG/1
75 TABLET ORAL DAILY
Qty: 90 TABLET | Refills: 3 | Status: SHIPPED | OUTPATIENT
Start: 2024-03-07

## 2024-03-07 RX ORDER — CARVEDILOL 3.12 MG/1
3.12 TABLET ORAL 2 TIMES DAILY WITH MEALS
Qty: 180 TABLET | Refills: 3 | Status: SHIPPED | OUTPATIENT
Start: 2024-03-07

## 2024-03-07 NOTE — PROGRESS NOTES
Date of Office Visit: 2024  Encounter Provider: DAYAMI Vasquez  Place of Service: [facility] CARDIOLOGY  Patient Name: Harvinder Vega  :1960    No chief complaint on file.  : acute on chronic heart failure    HPI: Harvinder Vega is a 64 y.o. male who is a patient of  Dr. Viveros.  Presents today for 2-week hospital follow-up.  History of ischemic cardiomyopathy, coronary artery disease with prior CABG, hypertension, hyperlipidemia, chronic right bundle branch block, PVD and obesity.  In 2020, he presented with an non-ST elevation MI.  His infarct was complicated by cardiogenic shock.  He ultimately underwent bypass surgery with LIMA to LAD and SVG to diagonal branch of the LAD, obtuse marginal branch of circumflex and posterior descending branch of the right coronary artery.  His surgery was performed in [facility].  Follow-up echocardiogram showed mildly depressed left ventricular systolic function of 45 to 50%.  He also had mild mitral and tricuspid valve regurgitation.    He presented to [facility], operated by [facility] emergency department 2024 with complaints of acute onset of shortness of breath.  Was found to be very hypertensive with blood pressure 225/168.  He was given 2 nitroglycerin by EMS for hypertension, room air saturation was 67% when EMS first arrived.  Patient was found to be in acute respiratory failure secondary to RSV and acute heart failure.  Echocardiogram on 2024 showed LVEF of 40%, mild concentric hypertrophy and mild mitral regurgitation.  He underwent IV diuresis.  Was euvolemic at discharge and discharged on his home dose of Lasix.    Mr. Vega presents today with no complaints.  He states that he is feeling so much better and back to his normal self.  He has no complaints of chest pain, pressure, shortness of air or lightheadedness.  He is euvolemic on physical exam.  We had a discussion regarding possibility of sleep  apnea as he does state that he snores and continues to have right bundle branch block on his EKG.  His blood pressure is well-controlled.  Lipid panel in target range.  He said he does exercise but cannot seem to lose weight.  Discussed dietary modifications.    Previous testing and notes have been reviewed by me.   Past Medical History:   Diagnosis Date    Acute respiratory failure     hypoxic    Arthritis     Claustrophobia     Coronary artery disease     Herniated intervertebral disc of lumbar spine     Hyperlipidemia     Low back pain     RBBB (right bundle branch block)        Past Surgical History:   Procedure Laterality Date    CORONARY ARTERY BYPASS GRAFT      GANGLION CYST EXCISION Right     ANKLE    KNEE SURGERY Right 2013    LUMBAR EPIDURAL INJECTION      LUMBAR LAMINECTOMY DISCECTOMY DECOMPRESSION Left 8/31/2017    Procedure: Left L4-5, L5-S1 laminectomy;  Surgeon: Galindo Hernandes MD;  Location: [facility];  Service:     ROTATOR CUFF REPAIR Right     SHOULDER SURGERY Right 2013    TONSILECTOMY, ADENOIDECTOMY, BILATERAL MYRINGOTOMY AND TUBES      1962       Social History     Socioeconomic History    Marital status:    Tobacco Use    Smoking status: Former     Current packs/day: 0.00     Average packs/day: 1.5 packs/day for 30.0 years (45.0 ttl pk-yrs)     Types: Cigarettes     Start date: 11/14/1990     Quit date: 11/14/2020     Years since quitting: 3.3    Smokeless tobacco: Never    Tobacco comments:     CAFFEINE USE: DIET MOUNTAIN DEW/TEA OCC.   Vaping Use    Vaping status: Never Used   Substance and Sexual Activity    Alcohol use: Not Currently    Drug use: Defer    Sexual activity: Yes     Partners: Female       Family History   Problem Relation Age of Onset    Heart failure Mother     Heart failure Father     Diabetes Maternal Grandfather     Malig Hyperthermia Neg Hx        Review of Systems   Constitutional: Negative.   HENT: Negative.     Eyes: Negative.    Cardiovascular: Negative.     Respiratory: Negative.     Endocrine: Negative.    Hematologic/Lymphatic:        Asa/plavix   Skin: Negative.    Musculoskeletal: Negative.    Gastrointestinal: Negative.    Genitourinary: Negative.    Neurological: Negative.    Psychiatric/Behavioral: Negative.     Allergic/Immunologic: Negative.        No Known Allergies      Current Outpatient Medications:     aspirin 81 MG chewable tablet, Chew 1 tablet Daily., Disp: 30 tablet, Rfl: 0    atorvastatin (LIPITOR) 10 MG tablet, Take 1 tablet by mouth Every Night., Disp: 30 tablet, Rfl: 0    carvedilol (COREG) 3.125 MG tablet, TAKE 1 TABLET BY MOUTH TWICE DAILY WITH MEALS, Disp: 180 tablet, Rfl: 0    clopidogrel (PLAVIX) 75 MG tablet, Take 1 tablet by mouth once daily (Patient taking differently: Take 1 tablet by mouth Daily.), Disp: 90 tablet, Rfl: 3    escitalopram (LEXAPRO) 10 MG tablet, Take 1 tablet by mouth Daily., Disp: , Rfl:     furosemide (Lasix) 40 MG tablet, Take 1 tablet by mouth 2 (Two) Times a Day for 2 days, THEN 1 tablet Daily., Disp: 92 tablet, Rfl: 1    guaiFENesin (MUCINEX) 600 MG 12 hr tablet, Take 1 tablet by mouth Every 12 (Twelve) Hours for 30 days., Disp: 60 tablet, Rfl: 0    lisinopril (PRINIVIL,ZESTRIL) 2.5 MG tablet, Take 1 tablet by mouth Daily., Disp: 30 tablet, Rfl: 0    pantoprazole (PROTONIX) 40 MG EC tablet, Take 1 tablet by mouth 2 (Two) Times a Day Before Meals., Disp: 60 tablet, Rfl: 0    potassium chloride (K-DUR,KLOR-CON) 20 MEQ CR tablet, Take 1 tablet by mouth once daily, Disp: 90 tablet, Rfl: 0      Objective:     There were no vitals filed for this visit.  There is no height or weight on file to calculate BMI.    2D Echocardiogram 2/19/2024:    Left ventricular systolic function is mildly decreased. Estimated left ventricular EF = 40%    The left ventricular cavity is mildly dilated.    Left ventricular wall thickness is consistent with mild concentric hypertrophy.    The following left ventricular wall segments are  dyskinetic: basal anterior and mid anterior. The following left ventricular wall segments are akinetic: basal anterolateral, mid anterolateral and mid anteroseptal.    Left ventricular diastolic function is consistent with (grade I) impaired relaxation.    Mildly reduced right ventricular systolic function noted.    There is calcification of the aortic valve.    2D Echocardiogram 4/5/2021:  Calculated left ventricular EF = 47.2% Estimated left ventricular EF = 47% Estimated left ventricular EF was in disagreement with the calculated left ventricular EF. Left ventricular ejection fraction appears to be 46 - 50%. Left ventricular systolic function is low normal. The left ventricular cavity is mildly dilated. Left ventricular wall thickness is consistent with concentric hypertrophy. All left ventricular wall segments contract normally. Left ventricular diastolic function was normal.  The left atrial cavity is moderately dilated. RThe right atrial cavity is moderately dilated.  Mild mitral valve regurgitation is present.  Limited 2D imaging of cardiac valves     2D Echocardiogram 04/02/2021:  The study is technically difficult for diagnosis. The quality of the study is limited due to patient body habitus, patient positioning and patient being intubated.  Left ventricle was poorly visualized on the study, and accurate assessment of wall motion and ejection fraction is not possible (even with use of Definity)  Left ventricular wall thickness is consistent with mild concentric hypertrophy.  Right ventricle not well visualized.  Mild mitral valve regurgitation is present  There is no evidence of pericardial effusion    2D Echocardiogram 02/10/2021:  Calculated left ventricular EF = 63% Estimated left ventricular EF = 63% Estimated left ventricular EF was in agreement with the calculated left ventricular EF. Left ventricular systolic function is normal. Normal left ventricular cavity size and wall thickness noted. All left  ventricular wall segments contract normally. Left ventricular diastolic function was indeterminate.  Mild aortic valve regurgitation is present  Moderate to severe mitral valve regurgitation is present.  Trace tricuspid valve regurgitation is present. Insufficient TR velocity profile to estimate the right ventricular systolic pressure.  Mild dilation of the sinuses of Valsalva is present. Sinus of Valsalva = 4.1 cm     PHYSICAL EXAM:    Constitutional:       Appearance: Healthy appearance. Not in distress.   Neck:      Vascular: No JVR. JVD normal.   Pulmonary:      Effort: Pulmonary effort is normal.      Breath sounds: Normal breath sounds. No wheezing. No rhonchi. No rales.   Chest:      Chest wall: Not tender to palpatation.   Cardiovascular:      PMI at left midclavicular line. Normal rate. Regular rhythm. Normal S1. Normal S2.       Murmurs: There is no murmur.      No gallop.  No click. No rub.   Pulses:     Intact distal pulses.   Edema:     Peripheral edema absent.   Abdominal:      General: Bowel sounds are normal.      Palpations: Abdomen is soft.      Tenderness: There is no abdominal tenderness.   Musculoskeletal: Normal range of motion.         General: No tenderness. Skin:     General: Skin is warm and dry.   Neurological:      General: No focal deficit present.      Mental Status: Alert and oriented to person, place and time.           ECG 12 Lead    Date/Time: 3/7/2024 10:11 AM  Performed by: Padmini Gross APRN    Authorized by: Padmini Gross APRN  Comparison: compared with previous ECG from 2/19/2024  Similar to previous ECG  Rhythm: sinus rhythm  Rate: normal  BPM: 69  Conduction: right bundle branch block  Q waves: II and III    Other findings: T wave abnormality            Assessment:       Diagnosis Plan   1. Mixed hyperlipidemia        2. Ischemic cardiomyopathy        3. Right bundle branch block        4. Essential hypertension        5. Coronary artery disease involving native  coronary artery of native heart without angina pectoris        6. S/P CABG (coronary artery bypass graft)        7. Chronic HFrEF (heart failure with reduced ejection fraction)          No orders of the defined types were placed in this encounter.         Plan:       Chronic heart failure with reduced ejection fraction: Most recent EF 40%.  On GDMT with carvedilol, loop diuretic, ACE inhibitor.  Optimize as tolerated.   Ischemic cardiomyopathy: as above  Coronary artery disease: s/p CABG ( LIMA to LAD and SVG to diagonal branch of the LAD, obtuse marginal branch of circumflex and posterior descending branch of the right coronary artery) in 2020 in [place], [place]. On aspirin/plavix, statin and beta blocker.  No angina.  Stable EKG  PVD:  asa/plavix and statin  Hyperlipidemia goal LDL less than 70: Lipid panel in target range.  On statin therapy  Hypertension: Controlled.  Patient will monitor his blood pressure at home and let me know how readings look.  We will make adjustments accordingly.  Would like to increase carvedilol if patient tolerates.  Right bundle branch block: Referral placed to sleep medicine for possible sleep study  Obesity: Dietary modifications and Grease exercise activity    Mr. Vega will follow-up with either Dr. Viveros or myself in 6 months.  He will call sooner for any questions or concerns.         Your medication list            Accurate as of March 7, 2024  8:23 AM. If you have any questions, ask your nurse or doctor.                CONTINUE taking these medications        Instructions Last Dose Given Next Dose Due   Aspirin Low Dose 81 MG chewable tablet  Generic drug: aspirin      Chew 1 tablet Daily.       atorvastatin 10 MG tablet  Commonly known as: LIPITOR      Take 1 tablet by mouth Every Night.       carvedilol 3.125 MG tablet  Commonly known as: COREG      TAKE 1 TABLET BY MOUTH TWICE DAILY WITH MEALS       clopidogrel 75 MG tablet  Commonly known as: PLAVIX      Take 1 tablet  by mouth once daily       escitalopram 10 MG tablet  Commonly known as: LEXAPRO      Take 1 tablet by mouth Daily.       furosemide 40 MG tablet  Commonly known as: Lasix  Start taking on: February 21, 2024      Take 1 tablet by mouth 2 (Two) Times a Day for 2 days, THEN 1 tablet Daily.       lisinopril 2.5 MG tablet  Commonly known as: PRINIVIL,ZESTRIL      Take 1 tablet by mouth Daily.       Mucus Relief 600 MG 12 hr tablet  Generic drug: guaiFENesin      Take 1 tablet by mouth Every 12 (Twelve) Hours for 30 days.       pantoprazole 40 MG EC tablet  Commonly known as: PROTONIX      Take 1 tablet by mouth 2 (Two) Times a Day Before Meals.       potassium chloride 20 MEQ CR tablet  Commonly known as: K-DUR,KLOR-CON      Take 1 tablet by mouth once daily                  As always, it has been a pleasure to participate in your patient's care.      Sincerely,       DAYAMI Perez

## 2024-03-12 ENCOUNTER — OFFICE VISIT (OUTPATIENT)
Dept: SLEEP MEDICINE | Facility: HOSPITAL | Age: 64
End: 2024-03-12
Payer: MEDICARE

## 2024-03-12 VITALS
BODY MASS INDEX: 36.57 KG/M2 | WEIGHT: 270 LBS | SYSTOLIC BLOOD PRESSURE: 144 MMHG | HEART RATE: 73 BPM | HEIGHT: 72 IN | DIASTOLIC BLOOD PRESSURE: 87 MMHG | OXYGEN SATURATION: 95 %

## 2024-03-12 DIAGNOSIS — G47.10 HYPERSOMNOLENCE: ICD-10-CM

## 2024-03-12 DIAGNOSIS — G47.30 SLEEP-DISORDERED BREATHING: Primary | ICD-10-CM

## 2024-03-12 PROCEDURE — G0463 HOSPITAL OUTPT CLINIC VISIT: HCPCS

## 2024-03-12 NOTE — PROGRESS NOTES
[facility] New Patient[facility]Consultation       Reason for Consultation: LAURA    Patient Care Team:  Bruno Ricci MD as PCP - General (Internal Medicine)  Shon Phillip MD as Consulting Physician (Sleep Medicine)    Chief complaint: LAURA    History of present illness:    Thank you for asking me to see your patient.  The patient is a 64 y.o. male who reports that he was diagnosed with obstructive sleep apnea back in the 1980s.  At that time, he had nasal surgery.  The patient was recently seen by cardiology and reevaluation requested.    The patient was admitted to the hospital in April 2021 with hypertensive emergency with acute pulmonary edema.  He also had acute hypercapnic and hypoxic respiratory failure.  Patient was readmitted in February of this year with a similar episode.  The patient did have CABG 11/2020.  Again, acute hypercapnic and hypoxic respiratory failure noted.  The patient has known ischemic cardiomyopathy and mild mitral regurgitation.    Patient goes to bed between 11 PM and midnight gets out of bed between 8 and 9 AM.  He states he feels good upon arising.  He might take a nap daily.  [place] Sleepiness Scale is normal at 2.  The patient reports gaining 30 pounds in the last several years.  He does snore loudly.  He denies waking up short of breath or coughing or choking.  He does have a dry mouth in the morning.  He has difficulty falling asleep.    Review of Systems:    A complete review of systems was done and all were negative with the exception of frequent urination    History:  Past Medical History:   Diagnosis Date    Acute respiratory failure     hypoxic    Arthritis     Claustrophobia     Coronary artery disease     Herniated intervertebral disc of lumbar spine     Hyperlipidemia     Low back pain     RBBB (right bundle branch block)    ,   Past Surgical History:   Procedure Laterality Date    CORONARY ARTERY BYPASS GRAFT      GANGLION CYST EXCISION Right     ANKLE     "KNEE SURGERY Right 2013    LUMBAR EPIDURAL INJECTION      LUMBAR LAMINECTOMY DISCECTOMY DECOMPRESSION Left 8/31/2017    Procedure: Left L4-5, L5-S1 laminectomy;  Surgeon: Galindo Hernandes MD;  Location: [facility] OR;  Service:     ROTATOR CUFF REPAIR Right     SHOULDER SURGERY Right 2013    TONSILECTOMY, ADENOIDECTOMY, BILATERAL MYRINGOTOMY AND TUBES      1962   ,   Family History   Problem Relation Age of Onset    Heart failure Mother     Heart failure Father     Diabetes Maternal Grandfather     Malig Hyperthermia Neg Hx    , and   Social History     Socioeconomic History    Marital status:    Tobacco Use    Smoking status: Former     Current packs/day: 0.00     Average packs/day: 1.5 packs/day for 30.0 years (45.0 ttl pk-yrs)     Types: Cigarettes     Start date: 11/14/1990     Quit date: 11/14/2020     Years since quitting: 3.3    Smokeless tobacco: Never    Tobacco comments:     CAFFEINE USE: DIET MOUNTAIN DEW/TEA OCC.   Vaping Use    Vaping status: Never Used   Substance and Sexual Activity    Alcohol use: Not Currently    Drug use: Defer    Sexual activity: Yes     Partners: Female     E-cigarette/Vaping    E-cigarette/Vaping Use Never User     Passive Exposure No     Counseling Given No      E-cigarette/Vaping Substances     E-cigarette/Vaping Devices      Social History: He is retired.  1 coffee and 1 soda daily.    Allergies:  Patient has no known allergies.     Medication Review: Reviewed    Vital Signs:    Vitals:    03/12/24 0905   BP: 144/87   Pulse: 73   SpO2: 95%   Weight: 122 kg (270 lb)   Height: 182.9 cm (72.01\")      Body mass index is 36.61 kg/m².  Neck Circumference: 19.25 inches      Physical Exam:    Constitutional:  Well developed 64 y.o. male that appears in no apparent distress.  Awake & oriented times 3.  Normal mood with normal recent and remote memory and normal judgement.  Eyes:  Conjunctivae normal.  Oropharynx: Moist mucous membranes without exudate and a large tongue and " uvula and patent posterior pharyngeal opening and class II Mallampati airway.    Neck: Trachea midline  Respiratory: Effort is not labored  Cardiovascular: Radial pulse regular  Musculoskeletal: Gait appears normal, no digital clubbing evident, no pre-tibial edema    Results Review: I reviewed his previous hospitalizations.    Impression:   The patient reports a history of being diagnosed with obstructive sleep apnea in the 1980s.  He presently is not treated.  The patient has symptoms to suggest sleep disordered breathing/obstructive sleep apnea.  Additionally, the patient has complaints of hypersomnolence, he might take a nap daily, but his [place] Sleepiness Scale is normal.    STOP-BANG scored 6/8 consistent with high probability of having obstructive sleep apnea    Plan:  Good sleep hygiene measures should be maintained.  Weight loss would be beneficial in this patient who has class II severe obesity by Body mass index is 36.61 kg/m².    Pathophysiology of LAURA described to the patient.  Cardiovascular complications of untreated LAURA also reviewed.  I also described how untreated LAURA can affect hypertension and start the cascade of worsening ejection fraction with acute pulmonary edema and subsequent acute hypercarbic and hypoxic respiratory failure.    After reviewing all with the patient, I would recommend and he is agreeable to proceed with a home sleep study.  I described the procedure to him and I answered all of his questions.  If obstructive sleep apnea identified, would recommend auto titrating CPAP and I also discussed that with him and answered all of his questions related to treatment with auto CPAP.  After reviewing all with the patient, home sleep study will be scheduled and further recommendations will be made once those results are known.    Thank you for requesting me to assist in this patient's care.    Shon Phillip MD  Sleep Medicine  03/16/24  10:35 EDT

## 2024-03-21 ENCOUNTER — TELEPHONE (OUTPATIENT)
Dept: SLEEP MEDICINE | Facility: HOSPITAL | Age: 64
End: 2024-03-21
Payer: MEDICARE

## 2024-10-08 RX ORDER — LISINOPRIL 20 MG/1
TABLET ORAL
Qty: 90 TABLET | Refills: 0 | OUTPATIENT
Start: 2024-10-08

## 2024-11-20 RX ORDER — CLOPIDOGREL BISULFATE 75 MG/1
75 TABLET ORAL DAILY
Qty: 90 TABLET | Refills: 3 | Status: SHIPPED | OUTPATIENT
Start: 2024-11-20

## 2025-01-30 RX ORDER — POTASSIUM CHLORIDE 1500 MG/1
TABLET, EXTENDED RELEASE ORAL DAILY
Qty: 90 TABLET | Refills: 1 | Status: SHIPPED | OUTPATIENT
Start: 2025-01-30

## 2025-01-30 RX ORDER — FUROSEMIDE 40 MG/1
TABLET ORAL
Qty: 90 TABLET | Refills: 0 | Status: SHIPPED | OUTPATIENT
Start: 2025-01-30

## 2025-01-30 NOTE — TELEPHONE ENCOUNTER
When you get a chance, he is due for a 1 year follow up in March with Dr. Viveros. You can use the next available Wed or follow up spot if he isn't having any current problems.    Thanks,  Iman

## 2025-02-17 RX ORDER — ATORVASTATIN CALCIUM 10 MG/1
10 TABLET, FILM COATED ORAL NIGHTLY
Qty: 90 TABLET | Refills: 0 | Status: SHIPPED | OUTPATIENT
Start: 2025-02-17

## 2025-03-24 ENCOUNTER — TELEPHONE (OUTPATIENT)
Dept: CARDIOLOGY | Age: 65
End: 2025-03-24
Payer: MEDICARE

## 2025-03-25 RX ORDER — CARVEDILOL 3.12 MG/1
3.12 TABLET ORAL 2 TIMES DAILY WITH MEALS
Qty: 180 TABLET | Refills: 2 | Status: SHIPPED | OUTPATIENT
Start: 2025-03-25

## 2025-03-25 NOTE — TELEPHONE ENCOUNTER
Protocol Failed    NOV LVM for patient to call to schedule an upcoming appointment with us     LOV  3/7/2024 (INDU)    Plan:       Chronic heart failure with reduced ejection fraction: Most recent EF 40%.  On GDMT with carvedilol, loop diuretic, ACE inhibitor.  Optimize as tolerated.   Ischemic cardiomyopathy: as above  Coronary artery disease: s/p CABG ( LIMA to LAD and SVG to diagonal branch of the LAD, obtuse marginal branch of circumflex and posterior descending branch of the right coronary artery) in 2020 in [place], [place]. On aspirin/plavix, statin and beta blocker.  No angina.  Stable EKG  PVD:  asa/plavix and statin  Hyperlipidemia goal LDL less than 70: Lipid panel in target range.  On statin therapy  Hypertension: Controlled.  Patient will monitor his blood pressure at home and let me know how readings look.  We will make adjustments accordingly.  Would like to increase carvedilol if patient tolerates.  Right bundle branch block: Referral placed to sleep medicine for possible sleep study  Obesity: Dietary modifications and Grease exercise activity     Mr. Vega will follow-up with either Dr. Viveros or myself in 6 months.  He will call sooner for any questions or concerns.

## 2025-04-21 NOTE — PROGRESS NOTES
[facility] Follow Up Office Note     Encounter Date:25  Patient:Harvinder Vega  :1960  MRN:[id number]      Chief Complaint:   Chief Complaint   Patient presents with    Follow-up         History of Presenting Illness:        Harvinder Vega is a 65 y.o. male  that follows with  and is new to me today. They have a medical history of ischemic cardiomyopathy, coronary artery disease with prior CABG, hypertension, hyperlipidemia, chronic right bundle branch block, PVD and obesity. They are here today for follow-up.       He presented with a non-ST elevation MI in 2020.  His infarct was complicated by cardiogenic shock.  He ultimately underwent bypass surgery with LIMA to LAD and SVG to diagonal branch of the LAD, obtuse marginal branch of circumflex and posterior descending branch of the right coronary artery.  His surgery was performed in [place], [place].     He presented to [facility] emergency department 2024 with complaints of acute onset of shortness of breath.  Was found to be very hypertensive with blood pressure 225/168.  He was given 2 nitroglycerin by EMS for hypertension, room air saturation was 67% when EMS first arrived.  Patient was found to be in acute respiratory failure secondary to RSV and acute heart failure.  Echocardiogram on 2024 showed LVEF of 40%, mild concentric hypertrophy and mild mitral regurgitation.  He underwent IV diuresis.  Was euvolemic at discharge and discharged on his home dose of Lasix.     Patient reports today for follow-up.  He denies chest pain, palpitations, peripheral edema, dizziness, and fatigue.  Does report shortness of breath with exertion.  Patient denies any formal exercise regiment.  He does plan on potentially starting a walking regimen with friend at [facility].  Reports that he tries to eat healthy.  He is tolerating his current medication regimen well.      Review of Systems:  Review of Systems    Constitutional: Negative. Negative for malaise/fatigue.   HENT: Negative.     Eyes: Negative.    Cardiovascular:  Positive for dyspnea on exertion. Negative for chest pain, irregular heartbeat, leg swelling, orthopnea, palpitations and syncope.   Respiratory:  Negative for cough, shortness of breath and snoring.    Hematologic/Lymphatic: Negative.    Skin: Negative.    Musculoskeletal: Negative.    Gastrointestinal: Negative.    Genitourinary: Negative.    Neurological:  Negative for dizziness, headaches and light-headedness.   Psychiatric/Behavioral: Negative.         Current Outpatient Medications on File Prior to Visit   Medication Sig Dispense Refill    atorvastatin (LIPITOR) 10 MG tablet TAKE 1 TABLET BY MOUTH ONCE DAILY AT NIGHT 90 tablet 0    carvedilol (COREG) 3.125 MG tablet TAKE 1 TABLET BY MOUTH TWICE DAILY WITH MEALS 180 tablet 2    clopidogrel (PLAVIX) 75 MG tablet Take 1 tablet by mouth once daily 90 tablet 3    escitalopram (LEXAPRO) 10 MG tablet Take 1 tablet by mouth Daily.      furosemide (LASIX) 40 MG tablet Take 1 tablet by mouth once daily 90 tablet 0    lisinopril (PRINIVIL,ZESTRIL) 2.5 MG tablet Take 1 tablet by mouth Daily. 90 tablet 3    potassium chloride (KLOR-CON M20) 20 MEQ CR tablet TAKE 1  BY MOUTH ONCE DAILY 90 tablet 1     No current facility-administered medications on file prior to visit.       No Known Allergies    Past Medical History:   Diagnosis Date    Acute respiratory failure     hypoxic    Arthritis     Claustrophobia     Coronary artery disease     Herniated intervertebral disc of lumbar spine     Hyperlipidemia     Low back pain     RBBB (right bundle branch block)        Past Surgical History:   Procedure Laterality Date    CORONARY ARTERY BYPASS GRAFT      GANGLION CYST EXCISION Right     ANKLE    KNEE SURGERY Right 2013    LUMBAR EPIDURAL INJECTION      LUMBAR LAMINECTOMY DISCECTOMY DECOMPRESSION Left 8/31/2017    Procedure: Left L4-5, L5-S1 laminectomy;  Surgeon:  "Galindo Hernandes MD;  Location: [facility] OR;  Service:     ROTATOR CUFF REPAIR Right     SHOULDER SURGERY Right 2013    TONSILECTOMY, ADENOIDECTOMY, BILATERAL MYRINGOTOMY AND TUBES             Social History     Socioeconomic History    Marital status:    Tobacco Use    Smoking status: Former     Current packs/day: 0.00     Average packs/day: 1.5 packs/day for 30.0 years (45.0 ttl pk-yrs)     Types: Cigarettes     Start date: 1990     Quit date: 2020     Years since quittin.4    Smokeless tobacco: Never    Tobacco comments:     CAFFEINE USE: DIET MOUNTAIN DEW/TEA OCC.   Vaping Use    Vaping status: Never Used   Substance and Sexual Activity    Alcohol use: Not Currently    Drug use: Defer    Sexual activity: Yes     Partners: Female       Family History   Problem Relation Age of Onset    Heart failure Mother     Heart failure Father     Diabetes Maternal Grandfather     Malig Hyperthermia Neg Hx        The following portions of the patient's history were reviewed and updated as appropriate: allergies, current medications, past family history, past medical history, past social history, past surgical history and problem list.       Objective:       Vitals:    25 0955   BP: 120/70   BP Location: Left arm   Patient Position: Sitting   Cuff Size: Adult   Pulse: 64   SpO2: 94%   Weight: (!) 137 kg (303 lb)   Height: 182.9 cm (72.01\")         Physical Exam  Vitals and nursing note reviewed.   Constitutional:       Appearance: Normal appearance. He is obese.   Eyes:      Extraocular Movements: Extraocular movements intact.      Pupils: Pupils are equal, round, and reactive to light.   Cardiovascular:      Rate and Rhythm: Normal rate and regular rhythm.      Chest Wall: PMI is not displaced. No thrill.      Pulses: Normal pulses.      Heart sounds: Normal heart sounds.   Pulmonary:      Effort: Pulmonary effort is normal.      Breath sounds: Normal breath sounds.   Musculoskeletal:      " Cervical back: Normal range of motion.      Right lower leg: No edema.      Left lower leg: No edema.   Skin:     General: Skin is warm and dry.      Findings: Rash present.   Neurological:      General: No focal deficit present.      Mental Status: He is alert and oriented to person, place, and time. Mental status is at baseline.   Psychiatric:         Mood and Affect: Mood normal.         Behavior: Behavior normal.         Thought Content: Thought content normal.         Judgment: Judgment normal.               Lab Results   Component Value Date     02/21/2024     02/20/2024    K 3.5 02/21/2024    K 3.7 02/20/2024    CL 99 02/21/2024    CL 99 02/20/2024    CO2 24.0 02/21/2024    CO2 26.4 02/20/2024    BUN 21 02/21/2024    BUN 15 02/20/2024    CREATININE 0.98 02/21/2024    CREATININE 0.94 02/20/2024    EGFRIFNONA 81 05/13/2021    EGFRIFNONA 76 05/05/2021    GLUCOSE 102 (H) 02/21/2024    GLUCOSE 115 (H) 02/20/2024    CALCIUM 8.9 02/21/2024    CALCIUM 8.5 (L) 02/20/2024    ALBUMIN 3.9 02/20/2024    ALBUMIN 3.9 02/19/2024    BILITOT 0.7 02/20/2024    BILITOT 0.4 02/19/2024    AST 35 02/20/2024    AST 34 02/19/2024    ALT 20 02/20/2024    ALT 27 02/19/2024     Lab Results   Component Value Date    WBC 14.62 (H) 02/21/2024    WBC 17.66 (H) 02/20/2024    HGB 12.9 (L) 02/21/2024    HGB 11.6 (L) 02/20/2024    HCT 39.0 02/21/2024    HCT 35.3 (L) 02/20/2024    MCV 92.4 02/21/2024    MCV 90.7 02/20/2024     02/21/2024     02/20/2024     Lab Results   Component Value Date    CHOL 103 02/20/2024    CHOL 110 04/05/2021    TRIG 119 02/20/2024    TRIG 77 04/05/2021    HDL 32 (L) 01/17/2025    HDL 38 (L) 02/20/2024    LDL 75 01/17/2025    LDL 43 02/20/2024     Lab Results   Component Value Date    PROBNP 760.0 02/19/2024    PROBNP 634.7 04/01/2021     Lab Results   Component Value Date    TROPONINT 210 (C) 02/21/2024     Lab Results   Component Value Date    TSH 0.528 02/20/2024    TSH 1.960 04/01/2021            ECG 12 Lead    Date/Time: 4/22/2025 10:15 AM  Performed by: Sal Red APRN    Authorized by: Sal Red APRN  Comparison: compared with previous ECG from 3/7/2024  Rhythm: sinus rhythm  Ectopy: atrial premature contractions  Rate: normal  BPM: 64  Conduction: right bundle branch block  ST Segments: ST segments normal  T Waves: T waves normal  Other: no other findings        2D Echocardiogram 2/19/2024:    Left ventricular systolic function is mildly decreased. Estimated left ventricular EF = 40%    The left ventricular cavity is mildly dilated.    Left ventricular wall thickness is consistent with mild concentric hypertrophy.    The following left ventricular wall segments are dyskinetic: basal anterior and mid anterior. The following left ventricular wall segments are akinetic: basal anterolateral, mid anterolateral and mid anteroseptal.    Left ventricular diastolic function is consistent with (grade I) impaired relaxation.    Mildly reduced right ventricular systolic function noted.    There is calcification of the aortic valve.     2D Echocardiogram 4/5/2021:  Calculated left ventricular EF = 47.2% Estimated left ventricular EF = 47% Estimated left ventricular EF was in disagreement with the calculated left ventricular EF. Left ventricular ejection fraction appears to be 46 - 50%. Left ventricular systolic function is low normal. The left ventricular cavity is mildly dilated. Left ventricular wall thickness is consistent with concentric hypertrophy. All left ventricular wall segments contract normally. Left ventricular diastolic function was normal.  The left atrial cavity is moderately dilated. RThe right atrial cavity is moderately dilated.  Mild mitral valve regurgitation is present.  Limited 2D imaging of cardiac valves      2D Echocardiogram 04/02/2021:  The study is technically difficult for diagnosis. The quality of the study is limited due to patient body habitus, patient  positioning and patient being intubated.  Left ventricle was poorly visualized on the study, and accurate assessment of wall motion and ejection fraction is not possible (even with use of Definity)  Left ventricular wall thickness is consistent with mild concentric hypertrophy.  Right ventricle not well visualized.  Mild mitral valve regurgitation is present  There is no evidence of pericardial effusion     2D Echocardiogram 02/10/2021:  Calculated left ventricular EF = 63% Estimated left ventricular EF = 63% Estimated left ventricular EF was in agreement with the calculated left ventricular EF. Left ventricular systolic function is normal. Normal left ventricular cavity size and wall thickness noted. All left ventricular wall segments contract normally. Left ventricular diastolic function was indeterminate.  Mild aortic valve regurgitation is present  Moderate to severe mitral valve regurgitation is present.  Trace tricuspid valve regurgitation is present. Insufficient TR velocity profile to estimate the right ventricular systolic pressure.  Mild dilation of the sinuses of Valsalva is present. Sinus of Valsalva = 4.1 cm     Assessment:         Diagnoses and all orders for this visit:    1. Mixed hyperlipidemia (Primary)    2. Ischemic cardiomyopathy    3. Right bundle branch block  -     ECG 12 Lead    4. Essential hypertension    5. Coronary artery disease involving native coronary artery of native heart without angina pectoris  -     ECG 12 Lead    6. S/P CABG (coronary artery bypass graft)    7. Chronic HFrEF (heart failure with reduced ejection fraction)  -     Adult Transthoracic Echo Complete w/ Color, Spectral and Contrast if Necessary Per Protocol; Future            Plan:         Chronic heart failure with reduced ejection fraction:   --Echocardiogram 2/24 EF 40%.   --Continue carvedilol, furosemide, and lisinopril.  -- Will repeat echocardiogram prior to appointment with Dr. Viveros.     Ischemic  cardiomyopathy:   --Continue goal-directed medical therapies    Coronary artery disease:   --s/p CABG ( LIMA to LAD and SVG to diagonal branch of the LAD, obtuse marginal branch of circumflex and posterior descending branch of the right coronary artery) in 2020 in [place], [place].   --Continue aspirin, plavix, statin, and beta blocker.  Denies angina.  No acute changes on EKG    PVD:    -- Continue Asprin, Plavix, and statin    Hyperlipidemia goal LDL less than 70:   -- Last lipid panel 1/17/2025.  Total cholesterol 149.  HDL 32.  LDL 75.  --Continue atorvastatin 10mg  -- Encourage heart healthy dieting and increased activity.    Hypertension: Controlled.  -- No changes to medication regimen    Right bundle branch block:   --Chronic and asymptomatic    Obesity:   -- Encouraged weight loss which will help aid with management of hypertension, chronic heart failure, hyperlipidemia, and coronary artery disease.  --Body mass index is 41.08 kg/m².      Follow-up with Dr. Viveros in 6 months with echocardiogram        DAYAMI Gonzalez  [facility]  04/22/25  10:44 EDT

## 2025-04-22 ENCOUNTER — OFFICE VISIT (OUTPATIENT)
Dept: CARDIOLOGY | Age: 65
End: 2025-04-22
Payer: MEDICARE

## 2025-04-22 VITALS
HEIGHT: 72 IN | BODY MASS INDEX: 41.04 KG/M2 | HEART RATE: 64 BPM | OXYGEN SATURATION: 94 % | DIASTOLIC BLOOD PRESSURE: 70 MMHG | WEIGHT: 303 LBS | SYSTOLIC BLOOD PRESSURE: 120 MMHG

## 2025-04-22 DIAGNOSIS — I10 ESSENTIAL HYPERTENSION: ICD-10-CM

## 2025-04-22 DIAGNOSIS — E78.2 MIXED HYPERLIPIDEMIA: Primary | ICD-10-CM

## 2025-04-22 DIAGNOSIS — Z95.1 S/P CABG (CORONARY ARTERY BYPASS GRAFT): ICD-10-CM

## 2025-04-22 DIAGNOSIS — I50.22 CHRONIC HFREF (HEART FAILURE WITH REDUCED EJECTION FRACTION): ICD-10-CM

## 2025-04-22 DIAGNOSIS — I45.10 RIGHT BUNDLE BRANCH BLOCK: ICD-10-CM

## 2025-04-22 DIAGNOSIS — I25.10 CORONARY ARTERY DISEASE INVOLVING NATIVE CORONARY ARTERY OF NATIVE HEART WITHOUT ANGINA PECTORIS: ICD-10-CM

## 2025-04-22 DIAGNOSIS — I25.5 ISCHEMIC CARDIOMYOPATHY: ICD-10-CM

## 2025-04-22 PROCEDURE — 99214 OFFICE O/P EST MOD 30 MIN: CPT

## 2025-04-22 PROCEDURE — 1159F MED LIST DOCD IN RCRD: CPT

## 2025-04-22 PROCEDURE — 3074F SYST BP LT 130 MM HG: CPT

## 2025-04-22 PROCEDURE — 3078F DIAST BP <80 MM HG: CPT

## 2025-04-22 PROCEDURE — 1160F RVW MEDS BY RX/DR IN RCRD: CPT

## 2025-04-22 PROCEDURE — 93000 ELECTROCARDIOGRAM COMPLETE: CPT

## 2025-05-01 RX ORDER — FUROSEMIDE 40 MG/1
40 TABLET ORAL DAILY
Qty: 90 TABLET | Refills: 0 | Status: SHIPPED | OUTPATIENT
Start: 2025-05-01

## 2025-05-13 RX ORDER — ATORVASTATIN CALCIUM 10 MG/1
10 TABLET, FILM COATED ORAL NIGHTLY
Qty: 90 TABLET | Refills: 3 | Status: SHIPPED | OUTPATIENT
Start: 2025-05-13

## 2025-06-11 RX ORDER — LISINOPRIL 2.5 MG/1
2.5 TABLET ORAL DAILY
Qty: 90 TABLET | Refills: 3 | Status: SHIPPED | OUTPATIENT
Start: 2025-06-11

## 2025-06-17 ENCOUNTER — TELEPHONE (OUTPATIENT)
Age: 65
End: 2025-06-17
Payer: MEDICARE

## 2025-06-17 NOTE — TELEPHONE ENCOUNTER
Harvinder stopped by the office.    He is one of the people I had you sign a clearance for yesterday.    He came by the office wanting to know if he could get his echo today, the one scheduled for 10/20/25 when he sees you the same day for a 6 month follow up.    He insisted that he needed the echo before his hip surgery per Dr. Lester. And said that if we don't move it up it he will have to go back to Dr. RIVAS and have him schedule it. Since he needs it before the surgery.    I saw nothing about this in the clearance or the downloaded note for Dr. Lester. He did an EKG on 6/11/25 at [place], the results read no different than his last one with Sal on 4/22/25.    And he had an echo just last year on 2/19/24.    Please advise.    Thanks.  Iman

## 2025-06-18 NOTE — TELEPHONE ENCOUNTER
Can you help me with this and see if we can get him scheduled for an echo before his surgery to clear him.    Thanks so much in advance!!!    Iman

## 2025-06-24 ENCOUNTER — HOSPITAL ENCOUNTER (OUTPATIENT)
Dept: CARDIOLOGY | Facility: HOSPITAL | Age: 65
Discharge: HOME | End: 2025-06-24
Payer: MEDICARE

## 2025-06-24 VITALS
HEIGHT: 72 IN | SYSTOLIC BLOOD PRESSURE: 120 MMHG | HEART RATE: 70 BPM | BODY MASS INDEX: 41.04 KG/M2 | DIASTOLIC BLOOD PRESSURE: 80 MMHG | WEIGHT: 303 LBS

## 2025-06-24 DIAGNOSIS — I50.22 CHRONIC HFREF (HEART FAILURE WITH REDUCED EJECTION FRACTION): ICD-10-CM

## 2025-06-24 LAB
AORTIC DIMENSIONLESS INDEX: 0.73 (DI)
ASCENDING AORTA: 3.9 CM
AV MEAN PRESS GRAD SYS DOP V1V2: 4 MMHG
AV VMAX SYS DOP: 147 CM/SEC
BH CV ECHO MEAS - ACS: 1.98 CM
BH CV ECHO MEAS - AI P1/2T: 784.4 MSEC
BH CV ECHO MEAS - AO MAX PG: 8.6 MMHG
BH CV ECHO MEAS - AO ROOT AREA (BSA CORRECTED): 1.7 CM2
BH CV ECHO MEAS - AO ROOT DIAM: 4.2 CM
BH CV ECHO MEAS - AO V2 VTI: 27.7 CM
BH CV ECHO MEAS - AVA(I,D): 2.34 CM2
BH CV ECHO MEAS - EDV(CUBED): 205.9 ML
BH CV ECHO MEAS - EDV(MOD-SP2): 185 ML
BH CV ECHO MEAS - EDV(MOD-SP4): 172 ML
BH CV ECHO MEAS - EF(MOD-SP2): 45.4 %
BH CV ECHO MEAS - EF(MOD-SP4): 40.1 %
BH CV ECHO MEAS - ESV(CUBED): 74.8 ML
BH CV ECHO MEAS - ESV(MOD-SP2): 101 ML
BH CV ECHO MEAS - ESV(MOD-SP4): 103 ML
BH CV ECHO MEAS - FS: 28.6 %
BH CV ECHO MEAS - IVS/LVPW: 1.06 CM
BH CV ECHO MEAS - IVSD: 1.09 CM
BH CV ECHO MEAS - LAT PEAK E' VEL: 12.5 CM/SEC
BH CV ECHO MEAS - LV DIASTOLIC VOL/BSA (35-75): 67.7 CM2
BH CV ECHO MEAS - LV MASS(C)D: 260.2 GRAMS
BH CV ECHO MEAS - LV MAX PG: 4.3 MMHG
BH CV ECHO MEAS - LV MEAN PG: 2 MMHG
BH CV ECHO MEAS - LV SYSTOLIC VOL/BSA (12-30): 40.5 CM2
BH CV ECHO MEAS - LV V1 MAX: 104 CM/SEC
BH CV ECHO MEAS - LV V1 VTI: 20.3 CM
BH CV ECHO MEAS - LVIDD: 5.9 CM
BH CV ECHO MEAS - LVIDS: 4.2 CM
BH CV ECHO MEAS - LVOT AREA: 3.2 CM2
BH CV ECHO MEAS - LVOT DIAM: 2.02 CM
BH CV ECHO MEAS - LVPWD: 1.03 CM
BH CV ECHO MEAS - MED PEAK E' VEL: 8.4 CM/SEC
BH CV ECHO MEAS - MR MAX PG: 103.4 MMHG
BH CV ECHO MEAS - MR MAX VEL: 508.4 CM/SEC
BH CV ECHO MEAS - MV A DUR: 0.14 SEC
BH CV ECHO MEAS - MV A MAX VEL: 72.2 CM/SEC
BH CV ECHO MEAS - MV DEC SLOPE: 283.5 CM/SEC2
BH CV ECHO MEAS - MV DEC TIME: 0.2 SEC
BH CV ECHO MEAS - MV E MAX VEL: 73.3 CM/SEC
BH CV ECHO MEAS - MV E/A: 1.02
BH CV ECHO MEAS - MV MAX PG: 3.7 MMHG
BH CV ECHO MEAS - MV MEAN PG: 1.47 MMHG
BH CV ECHO MEAS - MV P1/2T: 91 MSEC
BH CV ECHO MEAS - MV V2 VTI: 30.5 CM
BH CV ECHO MEAS - MVA(P1/2T): 2.42 CM2
BH CV ECHO MEAS - MVA(VTI): 2.13 CM2
BH CV ECHO MEAS - PA V2 MAX: 89.2 CM/SEC
BH CV ECHO MEAS - PULM A REVS DUR: 0.11 SEC
BH CV ECHO MEAS - PULM A REVS VEL: 25.4 CM/SEC
BH CV ECHO MEAS - PULM DIAS VEL: 24.6 CM/SEC
BH CV ECHO MEAS - PULM S/D: 1.21
BH CV ECHO MEAS - PULM SYS VEL: 29.8 CM/SEC
BH CV ECHO MEAS - QP/QS: 0.46
BH CV ECHO MEAS - RAP SYSTOLE: 3 MMHG
BH CV ECHO MEAS - RV MAX PG: 1.64 MMHG
BH CV ECHO MEAS - RV V1 MAX: 64 CM/SEC
BH CV ECHO MEAS - RV V1 VTI: 10.5 CM
BH CV ECHO MEAS - RVOT DIAM: 1.9 CM
BH CV ECHO MEAS - RVSP: 12 MMHG
BH CV ECHO MEAS - SV(LVOT): 64.9 ML
BH CV ECHO MEAS - SV(MOD-SP2): 84 ML
BH CV ECHO MEAS - SV(MOD-SP4): 69 ML
BH CV ECHO MEAS - SV(RVOT): 29.8 ML
BH CV ECHO MEAS - SVI(LVOT): 25.5 ML/M2
BH CV ECHO MEAS - SVI(MOD-SP2): 33 ML/M2
BH CV ECHO MEAS - SVI(MOD-SP4): 27.1 ML/M2
BH CV ECHO MEAS - TR MAX PG: 9.4 MMHG
BH CV ECHO MEAS - TR MAX VEL: 153.2 CM/SEC
BH CV ECHO MEASUREMENTS AVERAGE E/E' RATIO: 7.01
BH CV XLRA - RV BASE: 4 CM
BH CV XLRA - RV LENGTH: 7.2 CM
BH CV XLRA - RV MID: 2.6 CM
BH CV XLRA - TDI S': 8.5 CM/SEC
LEFT ATRIUM VOLUME INDEX: 32.5 ML/M2
LV EF BIPLANE MOD: 42.3 %
SINUS: 4 CM
STJ: 3.6 CM

## 2025-06-24 PROCEDURE — 93306 TTE W/DOPPLER COMPLETE: CPT

## 2025-06-24 PROCEDURE — 25510000001 PERFLUTREN 6.52 MG/ML SUSPENSION 2 ML VIAL

## 2025-06-24 PROCEDURE — 93306 TTE W/DOPPLER COMPLETE: CPT | Performed by: INTERNAL MEDICINE

## 2025-06-24 RX ADMIN — PERFLUTREN 1 ML: 6.52 INJECTION, SUSPENSION INTRAVENOUS at 09:42

## 2025-07-29 RX ORDER — POTASSIUM CHLORIDE 1500 MG/1
20 TABLET, EXTENDED RELEASE ORAL DAILY
Qty: 90 TABLET | Refills: 3 | Status: SHIPPED | OUTPATIENT
Start: 2025-07-29

## 2025-07-29 RX ORDER — FUROSEMIDE 40 MG/1
40 TABLET ORAL DAILY
Qty: 90 TABLET | Refills: 3 | Status: SHIPPED | OUTPATIENT
Start: 2025-07-29

## 2025-07-29 NOTE — TELEPHONE ENCOUNTER
Pt had a BMP done on 06/11/25. Potassium,sodium and eGFR were all normal. Ok to refill. Protocol error.

## (undated) DEVICE — SUT VIC 1 OS8 27IN J699H

## (undated) DEVICE — SYR LUERLOK 20CC

## (undated) DEVICE — SPONGE,LAP,12"X12",XR,ST,5/PK,40PK/CS: Brand: MEDLINE

## (undated) DEVICE — DRSNG WND GZ PAD BORDERED 4X8IN STRL

## (undated) DEVICE — NDL SPINE 18G 31/2IN PNK

## (undated) DEVICE — GLV SURG BIOGEL LTX PF 7 1/2

## (undated) DEVICE — ENCORE® LATEX ORTHO SIZE 8, STERILE LATEX POWDER-FREE SURGICAL GLOVE: Brand: ENCORE

## (undated) DEVICE — GLV SURG BIOGEL LTX PF 7

## (undated) DEVICE — BANDAGE,GAUZE,BULKEE II,4.5"X4.1YD,STRL: Brand: MEDLINE

## (undated) DEVICE — 6.0MM PRECISION ROUND

## (undated) DEVICE — GOWN,REINF,POLY,ECL,PP SLV,XL: Brand: MEDLINE

## (undated) DEVICE — OCCLUSIVE GAUZE STRIP,3% BISMUTH TRIBROMOPHENATE IN PETROLATUM BLEND: Brand: XEROFORM

## (undated) DEVICE — PK NEURO SPINE 40

## (undated) DEVICE — ELECTRD BLD EXT EDGE 1P COAT 6.5IN STRL

## (undated) DEVICE — SUT MNCRYL PLS ANTIB UD 4/0 PS2 18IN

## (undated) DEVICE — APPL DURAPREP IODOPHOR APL 26ML

## (undated) DEVICE — ADHS SKIN DERMABOND

## (undated) DEVICE — OIL CARTRIDGE: Brand: CORE, MAESTRO

## (undated) DEVICE — MEDI-VAC YANKAUER SUCTION HANDLE W/BULBOUS TIP: Brand: CARDINAL HEALTH

## (undated) DEVICE — DIFFUSER: Brand: CORE, MAESTRO

## (undated) DEVICE — DISPOSABLE BIPOLAR CABLE 12FT. (3.6M): Brand: KIRWAN

## (undated) DEVICE — ANTIBACTERIAL UNDYED BRAIDED (POLYGLACTIN 910), SYNTHETIC ABSORBABLE SUTURE: Brand: COATED VICRYL

## (undated) DEVICE — ADHS SKIN DERMABOND TOP ADVANCED